# Patient Record
Sex: FEMALE | Race: WHITE | NOT HISPANIC OR LATINO
[De-identification: names, ages, dates, MRNs, and addresses within clinical notes are randomized per-mention and may not be internally consistent; named-entity substitution may affect disease eponyms.]

---

## 2017-08-15 ENCOUNTER — APPOINTMENT (OUTPATIENT)
Dept: OPHTHALMOLOGY | Facility: CLINIC | Age: 82
End: 2017-08-15
Payer: MEDICARE

## 2017-08-15 PROCEDURE — 92134 CPTRZ OPH DX IMG PST SGM RTA: CPT

## 2017-08-15 PROCEDURE — 92014 COMPRE OPH EXAM EST PT 1/>: CPT

## 2017-08-17 ENCOUNTER — APPOINTMENT (OUTPATIENT)
Dept: OPHTHALMOLOGY | Facility: CLINIC | Age: 82
End: 2017-08-17
Payer: MEDICARE

## 2017-08-17 DIAGNOSIS — H35.09 OTHER INTRARETINAL MICROVASCULAR ABNORMALITIES: ICD-10-CM

## 2017-08-17 PROCEDURE — 92014 COMPRE OPH EXAM EST PT 1/>: CPT

## 2017-08-17 PROCEDURE — 92083 EXTENDED VISUAL FIELD XM: CPT

## 2017-08-17 PROCEDURE — 92250 FUNDUS PHOTOGRAPHY W/I&R: CPT

## 2018-06-15 ENCOUNTER — INPATIENT (INPATIENT)
Facility: HOSPITAL | Age: 83
LOS: 1 days | Discharge: HOME | DRG: 395 | End: 2018-06-17
Attending: INTERNAL MEDICINE | Admitting: INTERNAL MEDICINE
Payer: COMMERCIAL

## 2018-06-15 VITALS
WEIGHT: 123.02 LBS | HEART RATE: 96 BPM | DIASTOLIC BLOOD PRESSURE: 82 MMHG | OXYGEN SATURATION: 97 % | SYSTOLIC BLOOD PRESSURE: 150 MMHG

## 2018-06-15 DIAGNOSIS — I10 ESSENTIAL (PRIMARY) HYPERTENSION: ICD-10-CM

## 2018-06-15 DIAGNOSIS — K59.00 CONSTIPATION, UNSPECIFIED: ICD-10-CM

## 2018-06-15 DIAGNOSIS — I63.9 CEREBRAL INFARCTION, UNSPECIFIED: ICD-10-CM

## 2018-06-15 DIAGNOSIS — M48.00 SPINAL STENOSIS, SITE UNSPECIFIED: ICD-10-CM

## 2018-06-15 DIAGNOSIS — E03.9 HYPOTHYROIDISM, UNSPECIFIED: ICD-10-CM

## 2018-06-15 DIAGNOSIS — K92.2 GASTROINTESTINAL HEMORRHAGE, UNSPECIFIED: ICD-10-CM

## 2018-06-15 DIAGNOSIS — F32.9 MAJOR DEPRESSIVE DISORDER, SINGLE EPISODE, UNSPECIFIED: ICD-10-CM

## 2018-06-15 DIAGNOSIS — Z29.9 ENCOUNTER FOR PROPHYLACTIC MEASURES, UNSPECIFIED: ICD-10-CM

## 2018-06-15 DIAGNOSIS — M79.7 FIBROMYALGIA: ICD-10-CM

## 2018-06-15 LAB
ALBUMIN SERPL ELPH-MCNC: 4.7 G/DL — SIGNIFICANT CHANGE UP (ref 3.3–5)
ALP SERPL-CCNC: 86 U/L — SIGNIFICANT CHANGE UP (ref 40–120)
ALT FLD-CCNC: 12 U/L — SIGNIFICANT CHANGE UP (ref 10–45)
ANION GAP SERPL CALC-SCNC: 14 MMOL/L — SIGNIFICANT CHANGE UP (ref 5–17)
APTT BLD: 33.1 SEC — SIGNIFICANT CHANGE UP (ref 27.5–37.4)
AST SERPL-CCNC: 18 U/L — SIGNIFICANT CHANGE UP (ref 10–40)
BASE EXCESS BLDV CALC-SCNC: 1.9 MMOL/L — SIGNIFICANT CHANGE UP (ref -2–2)
BASOPHILS # BLD AUTO: 0 K/UL — SIGNIFICANT CHANGE UP (ref 0–0.2)
BASOPHILS NFR BLD AUTO: 0.3 % — SIGNIFICANT CHANGE UP (ref 0–2)
BILIRUB SERPL-MCNC: 1.1 MG/DL — SIGNIFICANT CHANGE UP (ref 0.2–1.2)
BLD GP AB SCN SERPL QL: NEGATIVE — SIGNIFICANT CHANGE UP
BUN SERPL-MCNC: 14 MG/DL — SIGNIFICANT CHANGE UP (ref 7–23)
CA-I SERPL-SCNC: 1.22 MMOL/L — SIGNIFICANT CHANGE UP (ref 1.12–1.3)
CALCIUM SERPL-MCNC: 9.5 MG/DL — SIGNIFICANT CHANGE UP (ref 8.4–10.5)
CHLORIDE BLDV-SCNC: 101 MMOL/L — SIGNIFICANT CHANGE UP (ref 96–108)
CHLORIDE SERPL-SCNC: 97 MMOL/L — SIGNIFICANT CHANGE UP (ref 96–108)
CO2 BLDV-SCNC: 28 MMOL/L — SIGNIFICANT CHANGE UP (ref 22–30)
CO2 SERPL-SCNC: 25 MMOL/L — SIGNIFICANT CHANGE UP (ref 22–31)
CREAT SERPL-MCNC: 0.77 MG/DL — SIGNIFICANT CHANGE UP (ref 0.5–1.3)
EOSINOPHIL # BLD AUTO: 0.1 K/UL — SIGNIFICANT CHANGE UP (ref 0–0.5)
EOSINOPHIL NFR BLD AUTO: 0.8 % — SIGNIFICANT CHANGE UP (ref 0–6)
GAS PNL BLDV: 136 MMOL/L — SIGNIFICANT CHANGE UP (ref 136–145)
GAS PNL BLDV: SIGNIFICANT CHANGE UP
GLUCOSE BLDV-MCNC: 116 MG/DL — HIGH (ref 70–99)
GLUCOSE SERPL-MCNC: 118 MG/DL — HIGH (ref 70–99)
HCO3 BLDV-SCNC: 27 MMOL/L — SIGNIFICANT CHANGE UP (ref 21–29)
HCT VFR BLD CALC: 43.7 % — SIGNIFICANT CHANGE UP (ref 34.5–45)
HCT VFR BLD CALC: 45.7 % — HIGH (ref 34.5–45)
HCT VFR BLDA CALC: 47 % — SIGNIFICANT CHANGE UP (ref 39–50)
HGB BLD CALC-MCNC: 15.4 G/DL — SIGNIFICANT CHANGE UP (ref 11.5–15.5)
HGB BLD-MCNC: 15.1 G/DL — SIGNIFICANT CHANGE UP (ref 11.5–15.5)
HGB BLD-MCNC: 15.8 G/DL — HIGH (ref 11.5–15.5)
INR BLD: 1.02 RATIO — SIGNIFICANT CHANGE UP (ref 0.88–1.16)
LACTATE BLDV-MCNC: 1.8 MMOL/L — SIGNIFICANT CHANGE UP (ref 0.7–2)
LYMPHOCYTES # BLD AUTO: 1.1 K/UL — SIGNIFICANT CHANGE UP (ref 1–3.3)
LYMPHOCYTES # BLD AUTO: 12.5 % — LOW (ref 13–44)
MCHC RBC-ENTMCNC: 31.6 PG — SIGNIFICANT CHANGE UP (ref 27–34)
MCHC RBC-ENTMCNC: 31.8 PG — SIGNIFICANT CHANGE UP (ref 27–34)
MCHC RBC-ENTMCNC: 34.6 GM/DL — SIGNIFICANT CHANGE UP (ref 32–36)
MCHC RBC-ENTMCNC: 34.6 GM/DL — SIGNIFICANT CHANGE UP (ref 32–36)
MCV RBC AUTO: 91.4 FL — SIGNIFICANT CHANGE UP (ref 80–100)
MCV RBC AUTO: 91.8 FL — SIGNIFICANT CHANGE UP (ref 80–100)
MONOCYTES # BLD AUTO: 0.6 K/UL — SIGNIFICANT CHANGE UP (ref 0–0.9)
MONOCYTES NFR BLD AUTO: 6.9 % — SIGNIFICANT CHANGE UP (ref 2–14)
NEUTROPHILS # BLD AUTO: 7.1 K/UL — SIGNIFICANT CHANGE UP (ref 1.8–7.4)
NEUTROPHILS NFR BLD AUTO: 79.5 % — HIGH (ref 43–77)
PCO2 BLDV: 46 MMHG — SIGNIFICANT CHANGE UP (ref 35–50)
PH BLDV: 7.39 — SIGNIFICANT CHANGE UP (ref 7.35–7.45)
PLATELET # BLD AUTO: 249 K/UL — SIGNIFICANT CHANGE UP (ref 150–400)
PLATELET # BLD AUTO: 257 K/UL — SIGNIFICANT CHANGE UP (ref 150–400)
PO2 BLDV: 21 MMHG — LOW (ref 25–45)
POTASSIUM BLDV-SCNC: 3.8 MMOL/L — SIGNIFICANT CHANGE UP (ref 3.5–5.3)
POTASSIUM SERPL-MCNC: 4.1 MMOL/L — SIGNIFICANT CHANGE UP (ref 3.5–5.3)
POTASSIUM SERPL-SCNC: 4.1 MMOL/L — SIGNIFICANT CHANGE UP (ref 3.5–5.3)
PROT SERPL-MCNC: 7.8 G/DL — SIGNIFICANT CHANGE UP (ref 6–8.3)
PROTHROM AB SERPL-ACNC: 11 SEC — SIGNIFICANT CHANGE UP (ref 9.8–12.7)
RBC # BLD: 4.78 M/UL — SIGNIFICANT CHANGE UP (ref 3.8–5.2)
RBC # BLD: 4.98 M/UL — SIGNIFICANT CHANGE UP (ref 3.8–5.2)
RBC # FLD: 11.3 % — SIGNIFICANT CHANGE UP (ref 10.3–14.5)
RBC # FLD: 11.5 % — SIGNIFICANT CHANGE UP (ref 10.3–14.5)
RH IG SCN BLD-IMP: POSITIVE — SIGNIFICANT CHANGE UP
SAO2 % BLDV: 31 % — LOW (ref 67–88)
SODIUM SERPL-SCNC: 136 MMOL/L — SIGNIFICANT CHANGE UP (ref 135–145)
WBC # BLD: 8.9 K/UL — SIGNIFICANT CHANGE UP (ref 3.8–10.5)
WBC # BLD: 9.6 K/UL — SIGNIFICANT CHANGE UP (ref 3.8–10.5)
WBC # FLD AUTO: 8.9 K/UL — SIGNIFICANT CHANGE UP (ref 3.8–10.5)
WBC # FLD AUTO: 9.6 K/UL — SIGNIFICANT CHANGE UP (ref 3.8–10.5)

## 2018-06-15 PROCEDURE — 99285 EMERGENCY DEPT VISIT HI MDM: CPT | Mod: 25

## 2018-06-15 PROCEDURE — 99223 1ST HOSP IP/OBS HIGH 75: CPT

## 2018-06-15 PROCEDURE — 93010 ELECTROCARDIOGRAM REPORT: CPT

## 2018-06-15 RX ORDER — OXYCODONE HYDROCHLORIDE 5 MG/1
5 TABLET ORAL ONCE
Qty: 0 | Refills: 0 | Status: DISCONTINUED | OUTPATIENT
Start: 2018-06-15 | End: 2018-06-15

## 2018-06-15 RX ORDER — LEVOTHYROXINE SODIUM 125 MCG
112 TABLET ORAL DAILY
Qty: 0 | Refills: 0 | Status: DISCONTINUED | OUTPATIENT
Start: 2018-06-15 | End: 2018-06-17

## 2018-06-15 RX ORDER — POLYETHYLENE GLYCOL 3350 17 G/17G
17 POWDER, FOR SOLUTION ORAL
Qty: 0 | Refills: 0 | Status: DISCONTINUED | OUTPATIENT
Start: 2018-06-15 | End: 2018-06-17

## 2018-06-15 RX ORDER — DIAZEPAM 5 MG
5 TABLET ORAL
Qty: 0 | Refills: 0 | Status: DISCONTINUED | OUTPATIENT
Start: 2018-06-15 | End: 2018-06-17

## 2018-06-15 RX ORDER — DULOXETINE HYDROCHLORIDE 30 MG/1
60 CAPSULE, DELAYED RELEASE ORAL DAILY
Qty: 0 | Refills: 0 | Status: DISCONTINUED | OUTPATIENT
Start: 2018-06-15 | End: 2018-06-17

## 2018-06-15 RX ORDER — OXYCODONE HYDROCHLORIDE 5 MG/1
10 TABLET ORAL EVERY 4 HOURS
Qty: 0 | Refills: 0 | Status: DISCONTINUED | OUTPATIENT
Start: 2018-06-15 | End: 2018-06-17

## 2018-06-15 RX ORDER — ACETAMINOPHEN 500 MG
325 TABLET ORAL ONCE
Qty: 0 | Refills: 0 | Status: COMPLETED | OUTPATIENT
Start: 2018-06-15 | End: 2018-06-15

## 2018-06-15 RX ORDER — SENNA PLUS 8.6 MG/1
2 TABLET ORAL AT BEDTIME
Qty: 0 | Refills: 0 | Status: DISCONTINUED | OUTPATIENT
Start: 2018-06-15 | End: 2018-06-17

## 2018-06-15 RX ORDER — OXYCODONE HYDROCHLORIDE 5 MG/1
10 TABLET ORAL ONCE
Qty: 0 | Refills: 0 | Status: DISCONTINUED | OUTPATIENT
Start: 2018-06-15 | End: 2018-06-15

## 2018-06-15 RX ORDER — ACETAMINOPHEN 500 MG
975 TABLET ORAL ONCE
Qty: 0 | Refills: 0 | Status: DISCONTINUED | OUTPATIENT
Start: 2018-06-15 | End: 2018-06-15

## 2018-06-15 RX ORDER — DOCUSATE SODIUM 100 MG
100 CAPSULE ORAL THREE TIMES A DAY
Qty: 0 | Refills: 0 | Status: DISCONTINUED | OUTPATIENT
Start: 2018-06-15 | End: 2018-06-17

## 2018-06-15 RX ORDER — SODIUM CHLORIDE 9 MG/ML
500 INJECTION INTRAMUSCULAR; INTRAVENOUS; SUBCUTANEOUS ONCE
Qty: 0 | Refills: 0 | Status: DISCONTINUED | OUTPATIENT
Start: 2018-06-15 | End: 2018-06-17

## 2018-06-15 RX ADMIN — POLYETHYLENE GLYCOL 3350 17 GRAM(S): 17 POWDER, FOR SOLUTION ORAL at 18:38

## 2018-06-15 RX ADMIN — OXYCODONE HYDROCHLORIDE 10 MILLIGRAM(S): 5 TABLET ORAL at 15:27

## 2018-06-15 RX ADMIN — OXYCODONE HYDROCHLORIDE 10 MILLIGRAM(S): 5 TABLET ORAL at 20:09

## 2018-06-15 RX ADMIN — Medication 325 MILLIGRAM(S): at 15:27

## 2018-06-15 RX ADMIN — Medication 5 MILLIGRAM(S): at 21:26

## 2018-06-15 RX ADMIN — Medication 100 MILLIGRAM(S): at 21:26

## 2018-06-15 RX ADMIN — Medication 325 MILLIGRAM(S): at 16:18

## 2018-06-15 RX ADMIN — DULOXETINE HYDROCHLORIDE 60 MILLIGRAM(S): 30 CAPSULE, DELAYED RELEASE ORAL at 21:26

## 2018-06-15 RX ADMIN — SENNA PLUS 2 TABLET(S): 8.6 TABLET ORAL at 21:26

## 2018-06-15 RX ADMIN — OXYCODONE HYDROCHLORIDE 10 MILLIGRAM(S): 5 TABLET ORAL at 16:18

## 2018-06-15 RX ADMIN — OXYCODONE HYDROCHLORIDE 10 MILLIGRAM(S): 5 TABLET ORAL at 21:14

## 2018-06-15 NOTE — ED PROVIDER NOTE - OBJECTIVE STATEMENT
Attn - pt seen in [facility] - painless rectal bleeding after passed hard stool last night and pt attempted disimpaction with finger.  Pt had chronic intermittent abdo cramping and none different than usual.  Prior abdo surgery for hysterectomy, oophorectomy. and spinal fusion.  hx of hemorrhoids.  Pt takes Plavix and ASA CAD and CVA.  NO: other bleeding sites, lightheaded, chest pain, palp,

## 2018-06-15 NOTE — H&P ADULT - NSHPREVIEWOFSYSTEMS_GEN_ALL_CORE
CONSTITUTIONAL: No weakness, fevers or chills  EYES/ENT: No visual changes;  No vertigo or throat pain   NECK: No pain or stiffness  RESPIRATORY: No cough, wheezing, hemoptysis; No shortness of breath  CARDIOVASCULAR: No chest pain or palpitations  GASTROINTESTINAL: No abdominal or epigastric pain. No nausea, vomiting, or hematemesis; + constipation. + hematochezia.  GENITOURINARY: No dysuria, frequency or hematuria  NEUROLOGICAL: No numbness or weakness  SKIN: No itching, burning, rashes, or lesions   All other review of systems is negative unless indicated above.

## 2018-06-15 NOTE — ED PROVIDER NOTE - MEDICAL DECISION MAKING DETAILS
Attn - pt with painless rectal bleeding on ASA and Plavix - internal hemorrhoid vs diverticulosis.  Labs, monitor.  hemodynamically stable.  admit.

## 2018-06-15 NOTE — H&P ADULT - PMH
Benign Essential Hypertension    CVA (cerebral vascular accident)    Depression    Essential hypertension    Fibromyalgia    Hypothyroidism    Spinal stenosis

## 2018-06-15 NOTE — ED PROVIDER NOTE - NS ED ROS FT
ROS: no CP/SOB. no cough. no fever. no n/v/d/c. +abd pain. no rash. +bleeding. no urinary complaints. +weakness. no vision changes. no HA. no neck/back pain. no extremity swelling/deformity. No change in mental status.

## 2018-06-15 NOTE — H&P ADULT - PROBLEM SELECTOR PLAN 4
h/o chronic constipation.   - Start bowel regimen with senna, colace and miralax.   - To check AXR to assess abdomen

## 2018-06-15 NOTE — ED PROVIDER NOTE - PHYSICAL EXAMINATION
Gen: NAD, AOx3  Head: NCAT  HEENT: PERRL, oral mucosa moist, normal conjunctiva, neck supple  Lung: CTAB, no respiratory distress  CV: rrr, no murmur, Normal perfusion  Abd: soft, NTND  Rectal: external hemorrhoids not bleeding, no stool in vault, +BRB on rectal exam  MSK: No edema, no visible deformities  Neuro: No focal neurologic deficits  Skin: No rash   Psych: normal affect

## 2018-06-15 NOTE — H&P ADULT - NSHPPHYSICALEXAM_GEN_ALL_CORE
.  VITAL SIGNS:  T(C): 37.2 (06-15-18 @ 14:30), Max: 37.2 (06-15-18 @ 14:30)  T(F): 98.9 (06-15-18 @ 14:30), Max: 98.9 (06-15-18 @ 14:30)  HR: 95 (06-15-18 @ 14:30) (95 - 96)  BP: 189/92 (06-15-18 @ 14:30) (150/82 - 189/92)  BP(mean): --  RR: 18 (06-15-18 @ 14:30) (18 - 18)  SpO2: 99% (06-15-18 @ 14:30) (97% - 99%)  Wt(kg): --    PHYSICAL EXAM:    Constitutional: WDWN resting comfortably in bed; NAD  Head: NC/AT  Eyes: PERRL, EOMI, anicteric sclera  ENT: no nasal discharge; uvula midline, no oropharyngeal erythema or exudates; MMM  Neck: supple; no JVD   Respiratory: CTA B/L; no W/R/R, no retractions  Cardiac: +S1/S2; RRR; no M/R/G  Gastrointestinal: soft, NT/ND; no rebound or guarding; +BSx4  Back: spine midline, no bony tenderness or step-offs  Extremities: WWP, no clubbing or cyanosis  Musculoskeletal: NROM x4; no joint swelling, tenderness or erythema  Vascular: 2+ radial, femoral, DP/PT pulses B/L  Neurologic: AAOx3; CNII-XII grossly intact; no focal deficits

## 2018-06-15 NOTE — ED ADULT NURSE NOTE - CHPI ED SYMPTOMS NEG
no vomiting/no dysuria/no burning urination/no diarrhea/no chills/no fever/no hematuria/no nausea/no abdominal distension

## 2018-06-15 NOTE — ED ADULT NURSE NOTE - CHIEF COMPLAINT QUOTE
rectal bleed x yesterday was seen at [facility], [place] was advised to come get seen

## 2018-06-15 NOTE — H&P ADULT - PROBLEM SELECTOR PLAN 1
Presenting with BRBPR for one day. Hemodynamically stable, on aspirin (taking 325mg when should be taking 81mg) and plavix. Possibly 2/2 diverticular bleed vs internal hemorrhoid vs tear given manual manipulation and history. Doubt upper GI bleed. Has never had c-scope as per pt 2/2 difficult anatomy. Last EGD 3 years prior  - Monitor CBC Q6  - Check orthostatics  - Will given jamila fluid hydration  - GI consult  - WIll hold aspirin and plavix  - SCD's  - Clear Liquid diet for now pending GI recommendations

## 2018-06-15 NOTE — H&P ADULT - HISTORY OF PRESENT ILLNESS
This is a 89 y/o female with h/o HTN, fibromyalgia, spinal stenosis on chronic pain medications, CVA who presented with BRBPR since last evening. Pt states that she was in her usual state of health, when last night since she had not had a BM in the past two days decided that she needed to use the restroom prior to bed. Chronic constipation is an issue with her since she takes opioids for pain. Pt inserted two suppositories and when nothing was happening began to strain more than she usually does and when she looked into the bowel noticed blood in the toliet. Blood was bright red. No clots. Pt then went to manually disimpact herself as she occasionally does and produced minimal bowel movement. Pt decided to go to bed hoping bleeding would stop, and when she woke up noticed that bleeding continued. She then went to see her PMD who referred her to ED for evaluation. Denied ever having c-scope. No bleeding in the past. Denies abdominal pain. No fever/chills. No dark stools

## 2018-06-15 NOTE — ED ADULT NURSE NOTE - OBJECTIVE STATEMENT
89 year old female patient on ASA and Plavix, presents to ED for rectal bleeding since last night. Patient states she felt constipated so she used a suppository and attempted to manually disimpact herself without success and then saw bright red blood in the toilet bowel. Patient states she continued to bleed through today, and noticed she was passing a few clots earlier today. Patient denies CP, SOB, abd pain, n/v/d, fever, chills, weakness. Patient able to speak in full sentences without SOB. Patient reporting chronic back pain, and states she has not been able to take any of her medication. Patient aware of plan of care, resting comfortably in bed.

## 2018-06-15 NOTE — H&P ADULT - ASSESSMENT
89 y/o female with h/o HTN, fibromyalgia, spinal stenosis on chronic pain medications, CVA who presented with BRBPR likely 2/2 LGIB

## 2018-06-15 NOTE — ED CLERICAL - NS ED CLERK NOTE PRE-ARRIVAL INFORMATION; ADDITIONAL PRE-ARRIVAL INFORMATION
28, rectal bleeding fresh since last night, on aspirin & plavix, dr. scott needs to see pt([phone])

## 2018-06-15 NOTE — ED PROVIDER NOTE - ATTENDING CONTRIBUTION TO CARE
I performed a history and physical exam of the patient and discussed their management with the resident/ACP. I reviewed the resident/ACP's note and agree with the documented findings and plan of care.  attn -  see MDM

## 2018-06-16 ENCOUNTER — TRANSCRIPTION ENCOUNTER (OUTPATIENT)
Age: 83
End: 2018-06-16

## 2018-06-16 LAB
ANION GAP SERPL CALC-SCNC: 16 MMOL/L — SIGNIFICANT CHANGE UP (ref 5–17)
BUN SERPL-MCNC: 13 MG/DL — SIGNIFICANT CHANGE UP (ref 7–23)
CALCIUM SERPL-MCNC: 9.1 MG/DL — SIGNIFICANT CHANGE UP (ref 8.4–10.5)
CHLORIDE SERPL-SCNC: 99 MMOL/L — SIGNIFICANT CHANGE UP (ref 96–108)
CO2 SERPL-SCNC: 23 MMOL/L — SIGNIFICANT CHANGE UP (ref 22–31)
CREAT SERPL-MCNC: 0.75 MG/DL — SIGNIFICANT CHANGE UP (ref 0.5–1.3)
GLUCOSE SERPL-MCNC: 93 MG/DL — SIGNIFICANT CHANGE UP (ref 70–99)
HCT VFR BLD CALC: 40.9 % — SIGNIFICANT CHANGE UP (ref 34.5–45)
HGB BLD-MCNC: 13.9 G/DL — SIGNIFICANT CHANGE UP (ref 11.5–15.5)
MAGNESIUM SERPL-MCNC: 2 MG/DL — SIGNIFICANT CHANGE UP (ref 1.6–2.6)
MCHC RBC-ENTMCNC: 30.7 PG — SIGNIFICANT CHANGE UP (ref 27–34)
MCHC RBC-ENTMCNC: 34 GM/DL — SIGNIFICANT CHANGE UP (ref 32–36)
MCV RBC AUTO: 90.3 FL — SIGNIFICANT CHANGE UP (ref 80–100)
PLATELET # BLD AUTO: 194 K/UL — SIGNIFICANT CHANGE UP (ref 150–400)
POTASSIUM SERPL-MCNC: 3.7 MMOL/L — SIGNIFICANT CHANGE UP (ref 3.5–5.3)
POTASSIUM SERPL-SCNC: 3.7 MMOL/L — SIGNIFICANT CHANGE UP (ref 3.5–5.3)
RBC # BLD: 4.53 M/UL — SIGNIFICANT CHANGE UP (ref 3.8–5.2)
RBC # FLD: 12.7 % — SIGNIFICANT CHANGE UP (ref 10.3–14.5)
SODIUM SERPL-SCNC: 138 MMOL/L — SIGNIFICANT CHANGE UP (ref 135–145)
TSH SERPL-MCNC: 1.53 UIU/ML — SIGNIFICANT CHANGE UP (ref 0.27–4.2)
WBC # BLD: 5.49 K/UL — SIGNIFICANT CHANGE UP (ref 3.8–10.5)
WBC # FLD AUTO: 5.49 K/UL — SIGNIFICANT CHANGE UP (ref 3.8–10.5)

## 2018-06-16 RX ADMIN — Medication 100 MILLIGRAM(S): at 11:27

## 2018-06-16 RX ADMIN — OXYCODONE HYDROCHLORIDE 10 MILLIGRAM(S): 5 TABLET ORAL at 15:47

## 2018-06-16 RX ADMIN — Medication 100 MILLIGRAM(S): at 06:12

## 2018-06-16 RX ADMIN — OXYCODONE HYDROCHLORIDE 10 MILLIGRAM(S): 5 TABLET ORAL at 11:26

## 2018-06-16 RX ADMIN — OXYCODONE HYDROCHLORIDE 10 MILLIGRAM(S): 5 TABLET ORAL at 20:28

## 2018-06-16 RX ADMIN — POLYETHYLENE GLYCOL 3350 17 GRAM(S): 17 POWDER, FOR SOLUTION ORAL at 17:08

## 2018-06-16 RX ADMIN — OXYCODONE HYDROCHLORIDE 10 MILLIGRAM(S): 5 TABLET ORAL at 11:49

## 2018-06-16 RX ADMIN — Medication 1 TABLET(S): at 11:27

## 2018-06-16 RX ADMIN — Medication 112 MICROGRAM(S): at 06:13

## 2018-06-16 RX ADMIN — SENNA PLUS 2 TABLET(S): 8.6 TABLET ORAL at 21:04

## 2018-06-16 RX ADMIN — DULOXETINE HYDROCHLORIDE 60 MILLIGRAM(S): 30 CAPSULE, DELAYED RELEASE ORAL at 11:27

## 2018-06-16 RX ADMIN — OXYCODONE HYDROCHLORIDE 10 MILLIGRAM(S): 5 TABLET ORAL at 15:45

## 2018-06-16 RX ADMIN — Medication 100 MILLIGRAM(S): at 21:04

## 2018-06-16 RX ADMIN — OXYCODONE HYDROCHLORIDE 10 MILLIGRAM(S): 5 TABLET ORAL at 20:50

## 2018-06-16 RX ADMIN — OXYCODONE HYDROCHLORIDE 10 MILLIGRAM(S): 5 TABLET ORAL at 23:50

## 2018-06-16 NOTE — DISCHARGE NOTE ADULT - CARE PROVIDER_API CALL
Primary Care Physician,   Phone: (   )    -  Fax: (   )    -    Dr. Aguirre (GI),   Phone: (   )    -  Fax: (   )    -

## 2018-06-16 NOTE — CONSULT NOTE ADULT - SUBJECTIVE AND OBJECTIVE BOX
Patient is a 89y old  Female who presents with a chief complaint of rectal bleeding(16 Jun 2018 11:40)      HPI:  This is a 89 y/o female with h/o HTN, fibromyalgia, spinal stenosis on chronic pain medications, CVA who presented with BRBPR since last evening. Pt states that she was in her usual state of health, when last night since she had not had a BM in the past two days decided that she needed to use the restroom prior to bed. Chronic constipation is an issue with her since she takes opioids for pain. Pt inserted two suppositories and when nothing was happening began to strain more than she usually does and when she looked into the bowel noticed blood in the toliet. Blood was bright red. No clots. Pt then went to manually disimpact herself as she occasionally does and produced minimal bowel movement. Pt decided to go to bed hoping bleeding would stop, and when she woke up noticed that bleeding continued. She then went to see her PMD who referred her to ED for evaluation. Denied ever having c-scope. No bleeding in the past. Denies abdominal pain. No fever/chills. No dark stools (15 Vishal 2018 16:38) The bleeding persisted overnight but her most recent bowel movement was normal  she denies abdominal pain  she denies nausea or vomiting      PAST MEDICAL & SURGICAL HISTORY:  Essential hypertension  Spinal stenosis  CVA (cerebral vascular accident)  Fibromyalgia  Depression  Hypothyroidism  Benign Essential Hypertension  Cataract: 2008, 2009  S/P Laminectomy: Lumbar Laminectomy 2001  History of Tonsillectomy: childhood  H/O: Hysterectomy: 40 years ago      MEDICATIONS  (STANDING):  docusate sodium 100 milliGRAM(s) Oral three times a day  DULoxetine 60 milliGRAM(s) Oral daily  levothyroxine 112 MICROGram(s) Oral daily  multivitamin 1 Tablet(s) Oral daily  polyethylene glycol 3350 17 Gram(s) Oral two times a day  senna 2 Tablet(s) Oral at bedtime  sodium chloride 0.9% Bolus 500 milliLiter(s) IV Bolus once    MEDICATIONS  (PRN):  diazepam    Tablet 5 milliGRAM(s) Oral two times a day PRN Anxiety  oxyCODONE    IR 10 milliGRAM(s) Oral every 4 hours PRN Severe Pain (7 - 10)      Allergies    dust (Sneezing)  lactose (Unknown)  nonsteroidal anti-inflammatory agents (Unknown)  Originally Entered as [Unknown] reaction to [SULFITE] (Unknown)  penicillin (Rash)    Intolerances        Review of Systems:    General:  No wt loss, fevers, chills, night sweats,fatigue,   CV:  No pain, palpitatioins, hypo/hypertension  Resp:  No dyspnea, cough, tachypnea, wheezing  GI:  No pain, No nausea, No vomiting, No diarrhea, No constipatiion, No weight loss, No fever, No pruritis, +rectal bleeding, No tarry stools, No dysphagia,  :  No pain, bleeding, incontinence, nocturia  Muscle:  No pain, weakness  Neuro:  No weakness, tingling, memory problems  Psych:  No fatigue, insomnia, mood problems, depression  Endocrine:  No polyuria, polydypsia, cold/heat intolerance  Heme:  No petechiae, ecchymosis, easy bruisability  Skin:  No rash, tattoos, scars, edema      Vital Signs Last 24 Hrs  T(C): 36.7 (16 Jun 2018 11:07), Max: 37.2 (15 Vishal 2018 14:30)  T(F): 98 (16 Jun 2018 11:07), Max: 98.9 (15 Vishal 2018 14:30)  HR: 89 (16 Jun 2018 11:07) (67 - 96)  BP: 157/63 (16 Jun 2018 11:07) (132/75 - 189/92)  BP(mean): --  RR: 18 (16 Jun 2018 11:07) (16 - 20)  SpO2: 98% (16 Jun 2018 11:07) (96% - 100%)    PHYSICAL EXAM:    Constitutional: NAD, well-developed  Neck: No LAD, supple  Respiratory: CTA and P  Cardiovascular: S1 and S2, RRR, no M  Gastrointestinal: BS+, soft, NT/ND, neg HSM,  Extremities: No peripheral edema, neg clubing, cyanosis  Vascular: 2+ peripheral pulses  Neurological: A/O x 3, no focal deficits  Psychiatric: Normal mood, normal affect  Skin: No rashes        LABS:                        13.9   5.49  )-----------( 194      ( 16 Jun 2018 09:15 )             40.9     06-16    138  |  99  |  13  ----------------------------<  93  3.7   |  23  |  0.75    Ca    9.1      16 Jun 2018 06:55  Mg     2.0     06-16    TPro  7.8  /  Alb  4.7  /  TBili  1.1  /  DBili  x   /  AST  18  /  ALT  12  /  AlkPhos  86  06-15    PT/INR - ( 15 Vishal 2018 14:52 )   PT: 11.0 sec;   INR: 1.02 ratio         PTT - ( 15 Vishal 2018 14:52 )  PTT:33.1 sec    LIVER FUNCTIONS - ( 15 Vishal 2018 14:52 )  Alb: 4.7 g/dL / Pro: 7.8 g/dL / ALK PHOS: 86 U/L / ALT: 12 U/L / AST: 18 U/L / GGT: x             RADIOLOGY & ADDITIONAL TESTS:

## 2018-06-16 NOTE — DISCHARGE NOTE ADULT - PROVIDER TOKENS
FREE:[LAST:[Primary Care Physician],PHONE:[(   )    -],FAX:[(   )    -]],FREE:[LAST:[Dr. Aguirre ()],PHONE:[(   )    -],FAX:[(   )    -]]

## 2018-06-16 NOTE — DISCHARGE NOTE ADULT - HOSPITAL COURSE
Pt admitted to [facility] because of BRBPR, likely 2' trauma from fecal impaction to rectosigmoid mucosa.  Pt hgb was stable from 12.6-13.9.  [facility] was consulted.  No indication for any endoscopic at this juncture.  We recommended adding senna to pt's stool softener regimen.  Also recommended using lanolin or glycerin/witch hazel based creams to help protect the inside of the rectum from further trauma/bleeding.  Pt advised to make f/u appt with her outside GI (Dr. Aguirre) in 1-2 weeks.

## 2018-06-16 NOTE — DISCHARGE NOTE ADULT - PATIENT PORTAL LINK FT
You can access the [facility] Patient Portal, offered by [facility], by registering with the following website: [email or web address]

## 2018-06-16 NOTE — DISCHARGE NOTE ADULT - PLAN OF CARE
resolved There are 2 common types of GI Bleed, Upper GI Bleed and Lower GI Bleed.  Upper GI Bleed affects the esophagus, stomach, and first part of the small intestine. Lower GI Bleed affects the colon and rectum.  Upper GI Bleed signs and symptoms to notify your Health Care Provider are vomiting blood, or coffee ground vomitus, and bowel movements that look like black tar.  Lower GI Bleed signs and symptoms to notify your health care provider are bright red bloody bowel movements.   Take your medications as prescribed by your Gastroenterologist.  If you have had an Endoscopy or Colonoscopy, follow up with your Gastroenterologist for Pathology results.  Avoid NSAIDs unless your Health Care Provider tells you that it is ok (Aspirin, Ibuprofen, Advil, Motrin, Aleve).  Follow up with your Gastroenterologist within 1-2 weeks of discharge. Follow up with your medical doctor to establish long term blood pressure treatment goals. Low salt diet  Activity as tolerated.  Take all medication as prescribed.  Follow up with your medical doctor for routine blood pressure monitoring at your next visit.  Notify your doctor if you have any of the following symptoms:   Dizziness, Lightheadedness, Blurry vision, Headache, Chest pain, Shortness of breath symptom management drink plenty of fluids and keep active  Laxatives as prescribed

## 2018-06-16 NOTE — DISCHARGE NOTE ADULT - MEDICATION SUMMARY - MEDICATIONS TO CHANGE
I will SWITCH the dose or number of times a day I take the medications listed below when I get home from the hospital:    Plavix 75 mg oral tablet  -- 1 tab(s) by mouth once a day

## 2018-06-16 NOTE — DISCHARGE NOTE ADULT - ADDITIONAL INSTRUCTIONS
Make appointments to follow up with your physicians  Resume your Aspirin (BUT "baby aspirin" 81mg NOT 325mg). Do not start taking your Plavix until NEXT SUNDAY (6/24/18)  Bring all discharge paperwork including discharge medication list to follow up appointments

## 2018-06-16 NOTE — DISCHARGE NOTE ADULT - MEDICATION SUMMARY - MEDICATIONS TO TAKE
I will START or STAY ON the medications listed below when I get home from the hospital:    Tylenol with Codeine #4 oral tablet  -- 1 tab(s) by mouth every 6 hours, As Needed  -- Indication: For pain    Aspirin Enteric Coated 81 mg oral delayed release tablet  -- 1 tab(s) by mouth once a day   -- Swallow whole.  Do not crush.  Take with food or milk.    -- Indication: For Coronary prophylaxis    losartan 100 mg oral tablet  -- 1 tab(s) by mouth once a day  -- Indication: For Essential hypertension    Valium 5 mg oral tablet  -- 1 tab(s) by mouth 3 times a day, As Needed  -- Indication: For anxiety    Cymbalta 60 mg oral delayed release capsule  -- 1 cap(s) by mouth once a day  -- Indication: For Depression    Plavix 75 mg oral tablet  -- 1 tab(s) by mouth once a day  DO NOT RESUME UNTIL JUNE 24, 2018  -- Indication: For Coronary artery disease    senna oral tablet  -- 2 tab(s) by mouth once a day (at bedtime).    Hold for loose stools.   -- Indication: For Laxative    polyethylene glycol 3350 oral powder for reconstitution  -- 17 gram(s) by mouth 2 times a day as needed for constipation  -- Indication: For Laxative    docusate sodium 100 mg oral capsule  -- 1 cap(s) by mouth 2 times a day.    Hold for loose stools.   -- Indication: For Laxative    Synthroid 112 mcg (0.112 mg) oral tablet  -- 1 tab(s) by mouth once a day  -- Indication: For Hypothyroidism    Multiple Vitamins oral tablet  -- 1 tab(s) by mouth once a day  -- Indication: For Suppliment

## 2018-06-16 NOTE — DISCHARGE NOTE ADULT - CARE PLAN
Principal Discharge DX:	Lower GI bleed  Goal:	resolved  Assessment and plan of treatment:	There are 2 common types of GI Bleed, Upper GI Bleed and Lower GI Bleed.  Upper GI Bleed affects the esophagus, stomach, and first part of the small intestine. Lower GI Bleed affects the colon and rectum.  Upper GI Bleed signs and symptoms to notify your Health Care Provider are vomiting blood, or coffee ground vomitus, and bowel movements that look like black tar.  Lower GI Bleed signs and symptoms to notify your health care provider are bright red bloody bowel movements.   Take your medications as prescribed by your Gastroenterologist.  If you have had an Endoscopy or Colonoscopy, follow up with your Gastroenterologist for Pathology results.  Avoid NSAIDs unless your Health Care Provider tells you that it is ok (Aspirin, Ibuprofen, Advil, Motrin, Aleve).  Follow up with your Gastroenterologist within 1-2 weeks of discharge.  Secondary Diagnosis:	Essential hypertension  Goal:	Follow up with your medical doctor to establish long term blood pressure treatment goals.  Assessment and plan of treatment:	Low salt diet  Activity as tolerated.  Take all medication as prescribed.  Follow up with your medical doctor for routine blood pressure monitoring at your next visit.  Notify your doctor if you have any of the following symptoms:   Dizziness, Lightheadedness, Blurry vision, Headache, Chest pain, Shortness of breath  Secondary Diagnosis:	Constipation  Goal:	symptom management  Assessment and plan of treatment:	drink plenty of fluids and keep active  Laxatives as prescribed

## 2018-06-16 NOTE — DISCHARGE NOTE ADULT - INSTRUCTIONS
call for  follow up  appointment  with  primary care   md   and  GI  md  diet  meds activity  as per md  any severe pain nausea  vomiting fevers  bleeding  any  problems  call md  call 911 [facility]  EMERGENCY ROOM low salt, low cholesterol

## 2018-06-16 NOTE — CONSULT NOTE ADULT - ASSESSMENT
89 year old woman with chronic constipation and rectal bleeding on aspirin and plavix that has since resolved. liekly rectal ucler versus fissure form bowel mvoment or disempaction versus hemorrhoids  stable hgb  continue miralax, colace and agree with addition of senna at night  can f/u as opt with her regular GI physician or our office  resume aspirin tomorrow and plavix in 1 week.   call with questions  will sign off  no contraindication to discahrge if has no further bleeding today  [phone]

## 2018-06-17 VITALS
RESPIRATION RATE: 18 BRPM | TEMPERATURE: 98 F | DIASTOLIC BLOOD PRESSURE: 90 MMHG | OXYGEN SATURATION: 97 % | SYSTOLIC BLOOD PRESSURE: 130 MMHG | HEART RATE: 70 BPM

## 2018-06-17 LAB
HCT VFR BLD CALC: 36.1 % — SIGNIFICANT CHANGE UP (ref 34.5–45)
HGB BLD-MCNC: 12.6 G/DL — SIGNIFICANT CHANGE UP (ref 11.5–15.5)
MCHC RBC-ENTMCNC: 31.3 PG — SIGNIFICANT CHANGE UP (ref 27–34)
MCHC RBC-ENTMCNC: 34.9 GM/DL — SIGNIFICANT CHANGE UP (ref 32–36)
MCV RBC AUTO: 89.6 FL — SIGNIFICANT CHANGE UP (ref 80–100)
PLATELET # BLD AUTO: 210 K/UL — SIGNIFICANT CHANGE UP (ref 150–400)
RBC # BLD: 4.03 M/UL — SIGNIFICANT CHANGE UP (ref 3.8–5.2)
RBC # FLD: 12.9 % — SIGNIFICANT CHANGE UP (ref 10.3–14.5)
WBC # BLD: 5.96 K/UL — SIGNIFICANT CHANGE UP (ref 3.8–10.5)
WBC # FLD AUTO: 5.96 K/UL — SIGNIFICANT CHANGE UP (ref 3.8–10.5)

## 2018-06-17 PROCEDURE — 93005 ELECTROCARDIOGRAM TRACING: CPT

## 2018-06-17 PROCEDURE — 83605 ASSAY OF LACTIC ACID: CPT

## 2018-06-17 PROCEDURE — 86901 BLOOD TYPING SEROLOGIC RH(D): CPT

## 2018-06-17 PROCEDURE — 86900 BLOOD TYPING SEROLOGIC ABO: CPT

## 2018-06-17 PROCEDURE — 85014 HEMATOCRIT: CPT

## 2018-06-17 PROCEDURE — 85730 THROMBOPLASTIN TIME PARTIAL: CPT

## 2018-06-17 PROCEDURE — 86850 RBC ANTIBODY SCREEN: CPT

## 2018-06-17 PROCEDURE — 80053 COMPREHEN METABOLIC PANEL: CPT

## 2018-06-17 PROCEDURE — 85610 PROTHROMBIN TIME: CPT

## 2018-06-17 PROCEDURE — 83735 ASSAY OF MAGNESIUM: CPT

## 2018-06-17 PROCEDURE — 82330 ASSAY OF CALCIUM: CPT

## 2018-06-17 PROCEDURE — 99285 EMERGENCY DEPT VISIT HI MDM: CPT

## 2018-06-17 PROCEDURE — 82803 BLOOD GASES ANY COMBINATION: CPT

## 2018-06-17 PROCEDURE — 84132 ASSAY OF SERUM POTASSIUM: CPT

## 2018-06-17 PROCEDURE — 82947 ASSAY GLUCOSE BLOOD QUANT: CPT

## 2018-06-17 PROCEDURE — 82435 ASSAY OF BLOOD CHLORIDE: CPT

## 2018-06-17 PROCEDURE — 84295 ASSAY OF SERUM SODIUM: CPT

## 2018-06-17 PROCEDURE — 80048 BASIC METABOLIC PNL TOTAL CA: CPT

## 2018-06-17 PROCEDURE — 84443 ASSAY THYROID STIM HORMONE: CPT

## 2018-06-17 PROCEDURE — G0378: CPT

## 2018-06-17 PROCEDURE — 85027 COMPLETE CBC AUTOMATED: CPT

## 2018-06-17 RX ORDER — DOCUSATE SODIUM 100 MG
1 CAPSULE ORAL
Qty: 60 | Refills: 0 | OUTPATIENT
Start: 2018-06-17 | End: 2018-07-16

## 2018-06-17 RX ORDER — ASPIRIN/CALCIUM CARB/MAGNESIUM 324 MG
1 TABLET ORAL
Qty: 0 | Refills: 0 | COMMUNITY

## 2018-06-17 RX ORDER — SENNA PLUS 8.6 MG/1
2 TABLET ORAL
Qty: 60 | Refills: 0
Start: 2018-06-17 | End: 2018-07-16

## 2018-06-17 RX ORDER — ASPIRIN/CALCIUM CARB/MAGNESIUM 324 MG
1 TABLET ORAL
Qty: 30 | Refills: 0
Start: 2018-06-17 | End: 2018-07-16

## 2018-06-17 RX ORDER — CLOPIDOGREL BISULFATE 75 MG/1
1 TABLET, FILM COATED ORAL
Qty: 0 | Refills: 0 | COMMUNITY

## 2018-06-17 RX ORDER — POLYETHYLENE GLYCOL 3350 17 G/17G
17 POWDER, FOR SOLUTION ORAL
Qty: 250 | Refills: 0 | OUTPATIENT
Start: 2018-06-17 | End: 2018-06-29

## 2018-06-17 RX ORDER — DOCUSATE SODIUM 100 MG
1 CAPSULE ORAL
Qty: 60 | Refills: 0
Start: 2018-06-17 | End: 2018-07-16

## 2018-06-17 RX ORDER — POLYETHYLENE GLYCOL 3350 17 G/17G
17 POWDER, FOR SOLUTION ORAL
Qty: 250 | Refills: 0
Start: 2018-06-17 | End: 2018-06-29

## 2018-06-17 RX ORDER — SENNA PLUS 8.6 MG/1
2 TABLET ORAL
Qty: 60 | Refills: 0 | OUTPATIENT
Start: 2018-06-17 | End: 2018-07-16

## 2018-06-17 RX ADMIN — OXYCODONE HYDROCHLORIDE 10 MILLIGRAM(S): 5 TABLET ORAL at 12:32

## 2018-06-17 RX ADMIN — Medication 112 MICROGRAM(S): at 06:38

## 2018-06-17 RX ADMIN — Medication 5 MILLIGRAM(S): at 00:16

## 2018-06-17 RX ADMIN — Medication 100 MILLIGRAM(S): at 06:59

## 2018-06-17 RX ADMIN — Medication 100 MILLIGRAM(S): at 12:30

## 2018-06-17 RX ADMIN — POLYETHYLENE GLYCOL 3350 17 GRAM(S): 17 POWDER, FOR SOLUTION ORAL at 06:38

## 2018-06-17 RX ADMIN — Medication 1 TABLET(S): at 12:30

## 2018-06-17 RX ADMIN — OXYCODONE HYDROCHLORIDE 10 MILLIGRAM(S): 5 TABLET ORAL at 00:20

## 2018-06-17 RX ADMIN — OXYCODONE HYDROCHLORIDE 10 MILLIGRAM(S): 5 TABLET ORAL at 15:36

## 2018-06-17 RX ADMIN — DULOXETINE HYDROCHLORIDE 60 MILLIGRAM(S): 30 CAPSULE, DELAYED RELEASE ORAL at 12:30

## 2018-06-17 RX ADMIN — OXYCODONE HYDROCHLORIDE 10 MILLIGRAM(S): 5 TABLET ORAL at 12:30

## 2018-06-20 ENCOUNTER — APPOINTMENT (OUTPATIENT)
Dept: GASTROENTEROLOGY | Facility: CLINIC | Age: 83
End: 2018-06-20
Payer: MEDICARE

## 2018-06-20 VITALS
HEIGHT: 63 IN | WEIGHT: 121 LBS | BODY MASS INDEX: 21.44 KG/M2 | HEART RATE: 98 BPM | DIASTOLIC BLOOD PRESSURE: 82 MMHG | SYSTOLIC BLOOD PRESSURE: 103 MMHG

## 2018-06-20 DIAGNOSIS — Z80.49 FAMILY HISTORY OF MALIGNANT NEOPLASM OF OTHER GENITAL ORGANS: ICD-10-CM

## 2018-06-20 DIAGNOSIS — Z82.49 FAMILY HISTORY OF ISCHEMIC HEART DISEASE AND OTHER DISEASES OF THE CIRCULATORY SYSTEM: ICD-10-CM

## 2018-06-20 DIAGNOSIS — M81.0 AGE-RELATED OSTEOPOROSIS W/OUT CURRENT PATHOLOGICAL FRACTURE: ICD-10-CM

## 2018-06-20 DIAGNOSIS — M48.00 SPINAL STENOSIS, SITE UNSPECIFIED: ICD-10-CM

## 2018-06-20 DIAGNOSIS — D49.0 NEOPLASM OF UNSPECIFIED BEHAVIOR OF DIGESTIVE SYSTEM: ICD-10-CM

## 2018-06-20 DIAGNOSIS — E03.9 HYPOTHYROIDISM, UNSPECIFIED: ICD-10-CM

## 2018-06-20 DIAGNOSIS — Z86.59 PERSONAL HISTORY OF OTHER MENTAL AND BEHAVIORAL DISORDERS: ICD-10-CM

## 2018-06-20 DIAGNOSIS — Z86.79 PERSONAL HISTORY OF OTHER DISEASES OF THE CIRCULATORY SYSTEM: ICD-10-CM

## 2018-06-20 DIAGNOSIS — Z87.19 PERSONAL HISTORY OF OTHER DISEASES OF THE DIGESTIVE SYSTEM: ICD-10-CM

## 2018-06-20 PROCEDURE — 99205 OFFICE O/P NEW HI 60 MIN: CPT

## 2018-06-20 RX ORDER — ACETAMINOPHEN AND CODEINE PHOSPHATE 300; 60 MG/1; MG/1
300-60 TABLET ORAL
Refills: 0 | Status: ACTIVE | COMMUNITY

## 2018-07-31 ENCOUNTER — APPOINTMENT (OUTPATIENT)
Dept: GASTROENTEROLOGY | Facility: CLINIC | Age: 83
End: 2018-07-31
Payer: MEDICARE

## 2018-07-31 VITALS
WEIGHT: 123 LBS | HEART RATE: 93 BPM | DIASTOLIC BLOOD PRESSURE: 82 MMHG | HEIGHT: 63 IN | BODY MASS INDEX: 21.79 KG/M2 | SYSTOLIC BLOOD PRESSURE: 138 MMHG

## 2018-07-31 DIAGNOSIS — K62.5 HEMORRHAGE OF ANUS AND RECTUM: ICD-10-CM

## 2018-07-31 DIAGNOSIS — K59.09 OTHER CONSTIPATION: ICD-10-CM

## 2018-07-31 DIAGNOSIS — K57.30 DIVERTICULOSIS OF LARGE INTESTINE W/OUT PERFORATION OR ABSCESS W/OUT BLEEDING: ICD-10-CM

## 2018-07-31 PROCEDURE — 99214 OFFICE O/P EST MOD 30 MIN: CPT

## 2019-01-10 PROBLEM — I10 ESSENTIAL (PRIMARY) HYPERTENSION: Chronic | Status: ACTIVE | Noted: 2018-06-15

## 2019-01-10 PROBLEM — M48.00 SPINAL STENOSIS, SITE UNSPECIFIED: Chronic | Status: ACTIVE | Noted: 2018-06-15

## 2019-01-10 PROBLEM — I63.9 CEREBRAL INFARCTION, UNSPECIFIED: Chronic | Status: ACTIVE | Noted: 2018-06-15

## 2019-01-10 PROBLEM — M79.7 FIBROMYALGIA: Chronic | Status: ACTIVE | Noted: 2018-06-15

## 2019-01-31 ENCOUNTER — APPOINTMENT (OUTPATIENT)
Dept: OPHTHALMOLOGY | Facility: CLINIC | Age: 84
End: 2019-01-31
Payer: MEDICARE

## 2019-01-31 DIAGNOSIS — H35.30 UNSPECIFIED MACULAR DEGENERATION: ICD-10-CM

## 2019-01-31 DIAGNOSIS — H53.143 VISUAL DISCOMFORT, BILATERAL: ICD-10-CM

## 2019-01-31 DIAGNOSIS — H04.123 DRY EYE SYNDROME OF BILATERAL LACRIMAL GLANDS: ICD-10-CM

## 2019-01-31 DIAGNOSIS — H53.30 UNSPECIFIED DISORDER OF BINOCULAR VISION: ICD-10-CM

## 2019-01-31 PROCEDURE — 92014 COMPRE OPH EXAM EST PT 1/>: CPT | Mod: 25

## 2019-01-31 PROCEDURE — 68761 CLOSE TEAR DUCT OPENING: CPT | Mod: 50

## 2019-08-06 ENCOUNTER — NON-APPOINTMENT (OUTPATIENT)
Age: 84
End: 2019-08-06

## 2019-08-06 ENCOUNTER — APPOINTMENT (OUTPATIENT)
Dept: OPHTHALMOLOGY | Facility: CLINIC | Age: 84
End: 2019-08-06
Payer: MEDICARE

## 2019-08-06 PROCEDURE — 92012 INTRM OPH EXAM EST PATIENT: CPT

## 2019-08-15 ENCOUNTER — APPOINTMENT (OUTPATIENT)
Dept: OPHTHALMOLOGY | Facility: CLINIC | Age: 84
End: 2019-08-15
Payer: MEDICARE

## 2019-08-15 ENCOUNTER — NON-APPOINTMENT (OUTPATIENT)
Age: 84
End: 2019-08-15

## 2019-08-15 PROCEDURE — 92083 EXTENDED VISUAL FIELD XM: CPT

## 2019-08-15 PROCEDURE — 92014 COMPRE OPH EXAM EST PT 1/>: CPT

## 2019-10-03 ENCOUNTER — NON-APPOINTMENT (OUTPATIENT)
Age: 84
End: 2019-10-03

## 2019-10-03 ENCOUNTER — APPOINTMENT (OUTPATIENT)
Dept: OPHTHALMOLOGY | Facility: CLINIC | Age: 84
End: 2019-10-03
Payer: MEDICARE

## 2019-10-03 PROCEDURE — 92012 INTRM OPH EXAM EST PATIENT: CPT

## 2019-12-13 NOTE — PROVIDER CONTACT NOTE (MEDICATION) - DATE AND TIME:
REASON FOR VISIT:  Right heel pain  Left toe pain of third fourth and fifth toe  Chronic lumbar sacral pain    HISTORY OF PRESENT ILLNESS:  As a chronic lumbar sacral pain for many years. Does have gout. Takes allopurinol 300 milligram per day. And she states that she has no pain at all during the daytime even walking. But at nighttime she gets terrific pain in her right heel and left 3 toes. And her back is even more sore than it was in the morning. She does take Aleve twice a day. Is taking allopurinol, her last uric acid done just a couple of months ago was 3.1.    Current Outpatient Medications   Medication Sig Dispense Refill   • colesevelam (WELCHOL) 625 MG tablet Take 2 tablets by mouth 3 times daily 540 tablet 0   • triamcinolone (ARISTOCORT) 0.1 % cream Apply to affected area  BID PRN 45 g 2   • naproxen sodium (ALEVE) 220 MG tablet Take 220 mg by mouth 2 times daily (with meals).     • folic acid (FOLATE) 1 MG tablet TAKE 1 TABLET BY mouth every day 90 tablet 0   • allopurinol (ZYLOPRIM) 300 MG tablet Take 1 tablet by mouth daily. 90 tablet 1   • bisoprolol-hydrochlorothiazide (ZIAC) 10-6.25 MG per tablet Take 2 tablets by mouth daily. 180 tablet 1   • Magnesium Cl-Calcium Carbonate (SLOW-MAG) 71.5-119 MG Tablet Enteric Coated Take 1 tablet by mouth 2 times daily. 180 tablet 1   • Polyethyl Glycol-Propyl Glycol (SYSTANE) 0.4-0.3 % Solution Place 1 drop into both eyes daily.     • Cholecalciferol (VITAMIN D-3 PO) Take 1 tablet by mouth daily.     • Cyanocobalamin (VITAMIN B-12 PO) Take 1 tablet by mouth daily.     • POTASSIUM CHLORIDE PO Take 1 tablet by mouth daily.     • aspirin 81 MG tablet Take 162 mg by mouth daily.     • Multiple Vitamins-Minerals (PRESERVISION AREDS 2) Cap Take 1 tablet by mouth 2 times daily.       No current facility-administered medications for this visit.      ALLERGIES:  No Known Allergies      REVIEW OF SYSTEMS:  All ROS negative except as mentioned in the HPI.      PHYSICAL  EXAM:  Visit Vitals  /70   Pulse 68   Ht 5' 3\" (1.6 m)   Wt 63 kg   BMI 24.60 kg/m²     Tender to palpation over lumbar spine, mostly midline. But does have some left sciatica. Her left 3 toes are tender to palpation. But they did have fairly decent range of motion for someone 85 years old. Her left heel is tender. And she may have a spur on there. Or possibly mild plantar fasciitis      ASSESSMENT:  Pain of right heel  (primary encounter diagnosis)  Comment: As needed  Plan: XR FOOT 3+ VW RIGHT        Negative by me    Pain of toe of left foot  Comment: As needed  Plan: XR FOOT 3+ VW LEFT        Negative by me    Chronic low back pain with left-sided sciatica, unspecified back pain laterality  Comment: As needed  Plan: XR LUMBAR SPINE 2 OR 3 VIEWS        levoscoliosis and subluxation with degenerative disc disease lumbar sacral spine      Orders Placed This Encounter   • XR Foot 3+ View Right   • XR Foot 3 + View Left   • XR Lumbar Spine 2 or 3 Views       No follow-ups on file.      PLAN:  Also needs her WelChol changed to colestipol 2 tablets 3 times a day    Will order MRI of lumbar spine prior to a cortisone injection  Supervising physician Dr. Jovani Vail.         16-Jun-2018 23:48

## 2020-09-18 NOTE — DISCHARGE NOTE ADULT - NSTOBACCONEVERSMOKERY/N_GEN_A
Consultation


Consult Date: 09/18/20


Attending physician:: TANISHA GARCIA


Provider Consulted: ALBERTINA TORRES


Consult reason:: abdominal pain, large ovarian mass





History of Present Illness


Admission Date/PCP: 


9/18/2020


Patient complains of: abdominal pain for 2 days


History of Present Illness: 


GYPSY KNOX is a 21 year old female G0 presents for 2 days history of pain 

and worsening last night - reports almost passed out from pain last night.  

presented to the ER late yesterday evening.  She report decrease appetite and 

5/5 pain.  She is being given pain meds in ER.  She has never had surgery before

and reports no other medical problems.





Past Medical History


Gynecological Infection: No





Social History


Information Source: Patient, Parent


Lives with: Family


Smoking Status: Never Smoker


Electronic Cigarette use?: No


Frequency of Alcohol Use: None


Hx Recreational Drug Use: No


Drugs: None


Hx Prescription Drug Abuse: No





Family History


Family History: CAD, DM, Other - mother has fibroids


Parental Family History Reviewed: No


Children Family History Reviewed: NA


Sibling(s) Family History Reviewed.: NA





Medication/Allergy


Home Medications: 








Cyclobenzaprine HCl [Flexeril 10 Mg Tablet] 10 mg PO Q6 PRN #15 tablet 07/04/12 


Ibuprofen [Motrin 800 Mg Tablet] 800 mg PO Q6 PRN #20 tablet 07/04/12 


No Home Medications 1 07/04/12 








Allergies/Adverse Reactions: 


                                        





No Known Allergies Allergy (Unverified 07/04/12 17:32)


   











Review of Systems


Constitutional: ABSENT: chills, fever(s), headache(s), weight gain, weight loss


Gastrointestinal: PRESENT: abdominal pain, nausea.  ABSENT: vomiting


Neurological: ABSENT: abnormal gait, abnormal speech, confusion, dizziness, 

focal weakness, syncope


Endocrine: ABSENT: cold intolerance, heat intolerance, polydipsia, polyuria


Hematologic/Lymphatic: ABSENT: easy bleeding, easy bruising





Physical Exam





- Physical Exam


Vital Signs: 


                                        











Temp Pulse Resp BP Pulse Ox


 


 98.5 F   72   20   107/71   100 


 


 09/18/20 03:29  09/18/20 03:29  09/18/20 03:29  09/18/20 03:29  09/18/20 03:29








                                 Intake & Output











 09/17/20 09/18/20 09/19/20





 06:59 06:59 06:59


 


Intake Total   1000


 


Balance   1000


 


Weight  110.2 kg 











General appearance: PRESENT: no acute distress, well-developed, well-nourished


Head exam: PRESENT: atraumatic, normocephalic


Respiratory exam: PRESENT: clear to auscultation brielle, symmetrical, unlabored


Cardiovascular exam: PRESENT: RRR.  ABSENT: diastolic murmur, rubs, systolic 

murmur


GI/Abdominal exam: PRESENT: tenderness


Rectal exam: PRESENT: deferred


Extremities exam: PRESENT: full ROM.  ABSENT: calf tenderness, clubbing, pedal 

edema


Neurological exam: PRESENT: alert, awake, oriented to person, oriented to place,

oriented to time, oriented to situation, CN II-XII grossly intact.  ABSENT: 

motor sensory deficit


Psychiatric exam: PRESENT: appropriate affect, normal mood.  ABSENT: homicidal 

ideation, suicidal ideation





Result


Laboratory Results: 


                                        





                                 09/18/20 03:37 





                                 09/18/20 03:37 





                                        











  09/18/20 09/18/20 09/18/20





  03:37 03:37 03:37


 


WBC  10.8 H  


 


RBC  4.73  


 


Hgb  13.5  


 


Hct  38.9  


 


MCV  82  


 


MCH  28.5  


 


MCHC  34.7  


 


RDW  13.9  


 


Plt Count  297  


 


Seg Neutrophils %  89.8 H  


 


Sodium   137.2 


 


Potassium   4.1 


 


Chloride   104 


 


Carbon Dioxide   21 L 


 


Anion Gap   12 


 


BUN   8 


 


Creatinine   0.67 


 


Est GFR ( Amer)   > 60 


 


Glucose   125 H 


 


Calcium   9.6 


 


Total Bilirubin   0.8 


 


AST   26 


 


Alkaline Phosphatase   72 


 


Total Protein   7.8 


 


Albumin   4.5 


 


Lipase   80.9 


 


Serum HCG, Qual    NEGATIVE


 


Urine Color   


 


Urine Appearance   


 


Urine pH   


 


Ur Specific Gravity   


 


Urine Protein   


 


Urine Glucose (UA)   


 


Urine Ketones   


 


Urine Blood   


 


Urine Nitrite   


 


Ur Leukocyte Esterase   


 


Urine WBC (Auto)   


 


Urine RBC (Auto)   














  09/18/20





  08:59


 


WBC 


 


RBC 


 


Hgb 


 


Hct 


 


MCV 


 


MCH 


 


MCHC 


 


RDW 


 


Plt Count 


 


Seg Neutrophils % 


 


Sodium 


 


Potassium 


 


Chloride 


 


Carbon Dioxide 


 


Anion Gap 


 


BUN 


 


Creatinine 


 


Est GFR (African Amer) 


 


Glucose 


 


Calcium 


 


Total Bilirubin 


 


AST 


 


Alkaline Phosphatase 


 


Total Protein 


 


Albumin 


 


Lipase 


 


Serum HCG, Qual 


 


Urine Color  YELLOW


 


Urine Appearance  SLIGHTLY-CLOUDY


 


Urine pH  5.0


 


Ur Specific Gravity  1.012


 


Urine Protein  NEGATIVE


 


Urine Glucose (UA)  NEGATIVE


 


Urine Ketones  20 H


 


Urine Blood  LARGE H


 


Urine Nitrite  NEGATIVE


 


Ur Leukocyte Esterase  TRACE H


 


Urine WBC (Auto)  6


 


Urine RBC (Auto)  1











Impressions: 


                                        





Transvaginal US  09/18/20 00:05


IMPRESSION:


There is a hypoechoic area at the right adnexa which demonstrates


some septations. This may represent an ovarian or adnexal


neoplasm such as a cystadenoma or cystadenocarcinoma. A


hydrosalpinx is felt to be less likely.


 








Abdomen/Pelvis CT  09/18/20 09:41


IMPRESSION:  Complex cystic lesion in the mid pelvis that measures 10.4 cm in AP

diameter, 10 cm in craniocaudal diameter and 12.8 cm in craniocaudal diameter ; 

the lesion contains eccentric ulcer calcifications likely represents the complex

cystic lesion described on the correlative ultrasound.  The favored differential

consideration is an ovarian dermoid cyst.


 











Status: Imported from PACS





Assessment & Plan





- Diagnosis


(1) Adnexal mass


Is this a current diagnosis for this admission?: Yes   


Plan: 


midpelvis probably right adnexal mass large 10-12 cm.  Pt with significant pain 

- medicated by ER.


Reviewed patient needs surgical intervention and needs to likely have ovary 

removed.


Concern that may not be a dermoid as it does not have all the features of a 

dermoid.  Concern for possible LMP.  Reviewed if find LMP may need to have 2nd 

interval surgery for lymph nodes etc. 


Patient mother works at [employer] and would prefer to transfer patient to [facility] 

for treatment.


WIll notify Sondra that family and patient request transfer.








- Time


Time Spent: 30 to 50 Minutes


Critical Time spent with patient: Less than 15 minutes


Medications reviewed and adjusted accordingly: No


Anticipated Discharge Disposition: Tertiary


Anticipated Discharge Timeframe: when bed available





- Inpatient Certification


Based on my medical assessment, after consideration of the patient's 

comorbidities, presenting symptoms, or acuity I expect that the services needed 

warrant INPATIENT care.: Yes


I certify that my determination is in accordance with my understanding of 

Medicare's requirements for reasonable and necessary INPATIENT services [42 CFR 

412.3e].: Yes


Medical Necessity: Need for Pain Control, Need for Surgery
No

## 2021-09-24 ENCOUNTER — APPOINTMENT (OUTPATIENT)
Dept: NEUROLOGY | Facility: CLINIC | Age: 86
End: 2021-09-24

## 2021-10-08 ENCOUNTER — EMERGENCY (EMERGENCY)
Facility: HOSPITAL | Age: 86
LOS: 1 days | Discharge: HOME | End: 2021-10-08
Attending: EMERGENCY MEDICINE
Payer: MEDICARE

## 2021-10-08 VITALS
OXYGEN SATURATION: 100 % | DIASTOLIC BLOOD PRESSURE: 76 MMHG | HEART RATE: 90 BPM | TEMPERATURE: 98 F | SYSTOLIC BLOOD PRESSURE: 148 MMHG | RESPIRATION RATE: 20 BRPM

## 2021-10-08 VITALS
HEART RATE: 95 BPM | HEIGHT: 64 IN | OXYGEN SATURATION: 97 % | RESPIRATION RATE: 20 BRPM | WEIGHT: 121.03 LBS | TEMPERATURE: 98 F | SYSTOLIC BLOOD PRESSURE: 172 MMHG | DIASTOLIC BLOOD PRESSURE: 82 MMHG

## 2021-10-08 LAB
ALBUMIN SERPL ELPH-MCNC: 4.9 G/DL — SIGNIFICANT CHANGE UP (ref 3.3–5)
ALP SERPL-CCNC: 53 U/L — SIGNIFICANT CHANGE UP (ref 40–120)
ALT FLD-CCNC: 12 U/L — SIGNIFICANT CHANGE UP (ref 10–45)
ANION GAP SERPL CALC-SCNC: 19 MMOL/L — HIGH (ref 5–17)
APTT BLD: 37 SEC — HIGH (ref 27.5–35.5)
AST SERPL-CCNC: 22 U/L — SIGNIFICANT CHANGE UP (ref 10–40)
BASOPHILS # BLD AUTO: 0.02 K/UL — SIGNIFICANT CHANGE UP (ref 0–0.2)
BASOPHILS NFR BLD AUTO: 0.3 % — SIGNIFICANT CHANGE UP (ref 0–2)
BILIRUB SERPL-MCNC: 0.8 MG/DL — SIGNIFICANT CHANGE UP (ref 0.2–1.2)
BUN SERPL-MCNC: 23 MG/DL — SIGNIFICANT CHANGE UP (ref 7–23)
CALCIUM SERPL-MCNC: 10.3 MG/DL — SIGNIFICANT CHANGE UP (ref 8.4–10.5)
CHLORIDE SERPL-SCNC: 94 MMOL/L — LOW (ref 96–108)
CO2 SERPL-SCNC: 22 MMOL/L — SIGNIFICANT CHANGE UP (ref 22–31)
CREAT SERPL-MCNC: 0.99 MG/DL — SIGNIFICANT CHANGE UP (ref 0.5–1.3)
EOSINOPHIL # BLD AUTO: 0.06 K/UL — SIGNIFICANT CHANGE UP (ref 0–0.5)
EOSINOPHIL NFR BLD AUTO: 0.8 % — SIGNIFICANT CHANGE UP (ref 0–6)
GLUCOSE SERPL-MCNC: 116 MG/DL — HIGH (ref 70–99)
HCT VFR BLD CALC: 42.7 % — SIGNIFICANT CHANGE UP (ref 34.5–45)
HGB BLD-MCNC: 14.7 G/DL — SIGNIFICANT CHANGE UP (ref 11.5–15.5)
IMM GRANULOCYTES NFR BLD AUTO: 0.3 % — SIGNIFICANT CHANGE UP (ref 0–1.5)
INR BLD: 1.23 RATIO — HIGH (ref 0.88–1.16)
LIDOCAIN IGE QN: 33 U/L — SIGNIFICANT CHANGE UP (ref 7–60)
LYMPHOCYTES # BLD AUTO: 1.51 K/UL — SIGNIFICANT CHANGE UP (ref 1–3.3)
LYMPHOCYTES # BLD AUTO: 19.7 % — SIGNIFICANT CHANGE UP (ref 13–44)
MCHC RBC-ENTMCNC: 30.1 PG — SIGNIFICANT CHANGE UP (ref 27–34)
MCHC RBC-ENTMCNC: 34.4 GM/DL — SIGNIFICANT CHANGE UP (ref 32–36)
MCV RBC AUTO: 87.5 FL — SIGNIFICANT CHANGE UP (ref 80–100)
MONOCYTES # BLD AUTO: 0.6 K/UL — SIGNIFICANT CHANGE UP (ref 0–0.9)
MONOCYTES NFR BLD AUTO: 7.8 % — SIGNIFICANT CHANGE UP (ref 2–14)
NEUTROPHILS # BLD AUTO: 5.45 K/UL — SIGNIFICANT CHANGE UP (ref 1.8–7.4)
NEUTROPHILS NFR BLD AUTO: 71.1 % — SIGNIFICANT CHANGE UP (ref 43–77)
NRBC # BLD: 0 /100 WBCS — SIGNIFICANT CHANGE UP (ref 0–0)
NT-PROBNP SERPL-SCNC: 586 PG/ML — HIGH (ref 0–300)
PLATELET # BLD AUTO: 220 K/UL — SIGNIFICANT CHANGE UP (ref 150–400)
POTASSIUM SERPL-MCNC: 3.2 MMOL/L — LOW (ref 3.5–5.3)
POTASSIUM SERPL-SCNC: 3.2 MMOL/L — LOW (ref 3.5–5.3)
PROT SERPL-MCNC: 7.6 G/DL — SIGNIFICANT CHANGE UP (ref 6–8.3)
PROTHROM AB SERPL-ACNC: 14.6 SEC — HIGH (ref 10.6–13.6)
RBC # BLD: 4.88 M/UL — SIGNIFICANT CHANGE UP (ref 3.8–5.2)
RBC # FLD: 11.9 % — SIGNIFICANT CHANGE UP (ref 10.3–14.5)
SARS-COV-2 RNA SPEC QL NAA+PROBE: SIGNIFICANT CHANGE UP
SODIUM SERPL-SCNC: 135 MMOL/L — SIGNIFICANT CHANGE UP (ref 135–145)
TROPONIN T, HIGH SENSITIVITY RESULT: 20 NG/L — SIGNIFICANT CHANGE UP (ref 0–51)
TROPONIN T, HIGH SENSITIVITY RESULT: 20 NG/L — SIGNIFICANT CHANGE UP (ref 0–51)
WBC # BLD: 7.66 K/UL — SIGNIFICANT CHANGE UP (ref 3.8–10.5)
WBC # FLD AUTO: 7.66 K/UL — SIGNIFICANT CHANGE UP (ref 3.8–10.5)

## 2021-10-08 PROCEDURE — 85025 COMPLETE CBC W/AUTO DIFF WBC: CPT

## 2021-10-08 PROCEDURE — 85610 PROTHROMBIN TIME: CPT

## 2021-10-08 PROCEDURE — 83880 ASSAY OF NATRIURETIC PEPTIDE: CPT

## 2021-10-08 PROCEDURE — 84484 ASSAY OF TROPONIN QUANT: CPT

## 2021-10-08 PROCEDURE — 71046 X-RAY EXAM CHEST 2 VIEWS: CPT

## 2021-10-08 PROCEDURE — 71046 X-RAY EXAM CHEST 2 VIEWS: CPT | Mod: 26

## 2021-10-08 PROCEDURE — U0005: CPT

## 2021-10-08 PROCEDURE — 80053 COMPREHEN METABOLIC PANEL: CPT

## 2021-10-08 PROCEDURE — 85730 THROMBOPLASTIN TIME PARTIAL: CPT

## 2021-10-08 PROCEDURE — U0003: CPT

## 2021-10-08 PROCEDURE — 99284 EMERGENCY DEPT VISIT MOD MDM: CPT | Mod: 25

## 2021-10-08 PROCEDURE — 83690 ASSAY OF LIPASE: CPT

## 2021-10-08 PROCEDURE — 93005 ELECTROCARDIOGRAM TRACING: CPT

## 2021-10-08 PROCEDURE — 99285 EMERGENCY DEPT VISIT HI MDM: CPT

## 2021-10-08 PROCEDURE — 36415 COLL VENOUS BLD VENIPUNCTURE: CPT

## 2021-10-08 PROCEDURE — 93010 ELECTROCARDIOGRAM REPORT: CPT

## 2021-10-08 NOTE — ED PROVIDER NOTE - PATIENT PORTAL LINK FT
You can access the FollowMyHealth Patient Portal offered by [facility] by registering at the following website: [email or web address]. By joining [employer]’s FollowMyHealth portal, you will also be able to view your health information using other applications (apps) compatible with our system.

## 2021-10-08 NOTE — ED PROVIDER NOTE - PROGRESS NOTE DETAILS
Juan Salgado MD:  Discussed case with PMD, patient has long-standing history of CHF with BNPs usually >5000, agrees not requiring admission at this time, will continue to see patient outpatient, informed PMD that patient takes 6-8 tylenol per day chronically, no elevated AST/ALt at this time.

## 2021-10-08 NOTE — ED PROVIDER NOTE - NSFOLLOWUPINSTRUCTIONS_ED_ALL_ED_FT
Congestive Heart Failure (CHF)    Congestive heart failure is a chronic condition in which the heart has trouble pumping blood. In some cases of heart failure, fluid may back up into your lungs or you may have swelling (edema) in your lower legs. There are many causes of heart failure including high blood pressure, coronary artery disease, abnormal heart valves, heart muscle disease, lung disease, diabetes, etc. Symptoms include shortness of breath with activity or when lying flat, cough, swelling of the legs, fatigue, or increased urination during the night.     Treatment is aimed at managing the symptoms of heart failure and may include lifestyle changes, medications, or surgical procedures. Take medicines only as directed by your health care provider and do not stop unless instructed to do so. Eat heart-healthy foods with low or no trans/saturated fats, cholesterol and salt. Weigh yourself every day for early recognition of fluid accumulation.    Continue taking all medications recommended by your primary care doctor, followup with her within the next 4-5 days to re-assess your chest pain.    SEEK IMMEDIATE MEDICAL CARE IF YOU HAVE ANY OF THE FOLLOWING SYMPTOMS: lightheadedness/dizziness/fainting, or worsening of symptoms including not being able to conduct normal physical activity.

## 2021-10-08 NOTE — ED PROVIDER NOTE - ATTENDING CONTRIBUTION TO CARE
89 yo F non smoker with pmh of h/o HTN, fibromyalgia, spinal stenosis on chronic pain medications, afib on eliquis, CVA, chronic chest pain and sob who is presenting with chest pain and ellis since yesterday told by her cards to come in, cardiac work up ordered, discuss with her cards.

## 2021-10-08 NOTE — ED PROVIDER NOTE - CLINICAL SUMMARY MEDICAL DECISION MAKING FREE TEXT BOX
Joseph Frankel PGY3: 89 yo with one episode of worsening chronic chest pain. VSS. Patient looks well and is non toxic appearing. PE as above. Most likely related to chronic chest pain as pain very atypical and has been ongoing for years per patient. However must consider ACS. Unlikely PE given story and already on blood thinners. Will get labs, imaging, and reassess.

## 2021-10-08 NOTE — ED ADULT NURSE REASSESSMENT NOTE - NS ED NURSE REASSESS COMMENT FT1
Report received from CAROL Barrett. Pt AAOx3, NAD, resp nonlabored, skin warm/dry, resting comfortably in bed with call bell at bedside. Pt c/o chest pain, but states it is not as severe as when she arrived. Pt denies headache, dizziness, palpitations, SOB, abd pain, n/v/d, urinary symptoms, fevers, chills at this time. Pt awaiting cards c/s. Safety maintained.

## 2021-10-08 NOTE — ED PROVIDER NOTE - OBJECTIVE STATEMENT
89 yo F non smoker with pmh of h/o HTN, fibromyalgia, spinal stenosis on chronic pain medications, afib on eliquis, CVA, chronic chest pain and sob who is presenting with chest pain. Is "pressure like". Non radiating. Most times mild severity. Yesterday patient had same chest pain but more severe in nature. Called her cardiologist who told her to come in yesterday but patient did not want to and decided to come today. States she still has chest pain but it is at the severity of her chronic chest pain. Not exertional and not relieved with rest. Endorses chronic sob which has not worsened recently. Denies pleuritic chest pain, ho clots, unilateral leg swelling, recent travel, n/v, diaphoresis, coughing, fevers.

## 2021-10-08 NOTE — ED ADULT NURSE NOTE - NSIMPLEMENTINTERV_GEN_ALL_ED
Implemented All Fall with Harm Risk Interventions:  [place] to call system. Call bell, personal items and telephone within reach. Instruct patient to call for assistance. Room bathroom lighting operational. Non-slip footwear when patient is off stretcher. Physically safe environment: no spills, clutter or unnecessary equipment. Stretcher in lowest position, wheels locked, appropriate side rails in place. Provide visual cue, wrist band, yellow gown, etc. Monitor gait and stability. Monitor for mental status changes and reorient to person, place, and time. Review medications for side effects contributing to fall risk. Reinforce activity limits and safety measures with patient and family. Provide visual clues: red socks.

## 2021-10-08 NOTE — ED ADULT NURSE NOTE - OBJECTIVE STATEMENT
89 yo female with a PMH of HTN, fibromyalgia, hypothyroidism, pinged nerves in cervical presents to the ED via waiting room from home complaining of chest pain that started yesterday. States that she was trying to go to bed when she felt a sudden onset of "crushing chest pain" that lasted about 2H. States that she always has chest pain "but this is something different." States PCP urged her today to go to the ER. Usually takes codeine around the clock for her nerve pain, took 30 minutes ago in the [place]. MD Steen at bedside evaluating patient. Denies headache, dizziness, vision changes, shortness of breath, abdominal pain, nausea, vomiting, diarrhea, fevers, chills, dysuria, hematuria, recent illness travel or fall.

## 2021-10-08 NOTE — ED PROVIDER NOTE - NS ED ROS FT
Gen: Denies fever, chills, generalized weakness  CV: + chest pain, denies palpitations  HEENT: Denies neck pain, headache, vision changes  Skin: Denies rash, erythema, color changes  Resp: + SOB, denies cough  Endo: Denies sensitivity to heat, cold, increased urination  GI: Denies constipation, nausea, vomiting, diarrhea  Msk: Denies back pain, LE swelling, extremity pain  : Denies dysuria, increased frequency  Neuro: Denies LOC, focal weakness, numbness, tingling  Psych: Denies hx of psych, SI, HI

## 2021-11-27 ENCOUNTER — EMERGENCY (EMERGENCY)
Facility: HOSPITAL | Age: 86
LOS: 1 days | Discharge: HOME | End: 2021-11-27
Attending: EMERGENCY MEDICINE
Payer: MEDICARE

## 2021-11-27 VITALS
DIASTOLIC BLOOD PRESSURE: 100 MMHG | HEIGHT: 64 IN | TEMPERATURE: 98 F | RESPIRATION RATE: 20 BRPM | SYSTOLIC BLOOD PRESSURE: 170 MMHG | WEIGHT: 121.92 LBS | HEART RATE: 62 BPM | OXYGEN SATURATION: 99 %

## 2021-11-27 LAB
BASOPHILS # BLD AUTO: 0.03 K/UL — SIGNIFICANT CHANGE UP (ref 0–0.2)
BASOPHILS NFR BLD AUTO: 0.4 % — SIGNIFICANT CHANGE UP (ref 0–2)
EOSINOPHIL # BLD AUTO: 0.09 K/UL — SIGNIFICANT CHANGE UP (ref 0–0.5)
EOSINOPHIL NFR BLD AUTO: 1.1 % — SIGNIFICANT CHANGE UP (ref 0–6)
HCT VFR BLD CALC: 43.4 % — SIGNIFICANT CHANGE UP (ref 34.5–45)
HGB BLD-MCNC: 15.1 G/DL — SIGNIFICANT CHANGE UP (ref 11.5–15.5)
IMM GRANULOCYTES NFR BLD AUTO: 0.4 % — SIGNIFICANT CHANGE UP (ref 0–1.5)
LYMPHOCYTES # BLD AUTO: 2.05 K/UL — SIGNIFICANT CHANGE UP (ref 1–3.3)
LYMPHOCYTES # BLD AUTO: 24.6 % — SIGNIFICANT CHANGE UP (ref 13–44)
MCHC RBC-ENTMCNC: 30.8 PG — SIGNIFICANT CHANGE UP (ref 27–34)
MCHC RBC-ENTMCNC: 34.8 GM/DL — SIGNIFICANT CHANGE UP (ref 32–36)
MCV RBC AUTO: 88.6 FL — SIGNIFICANT CHANGE UP (ref 80–100)
MONOCYTES # BLD AUTO: 0.71 K/UL — SIGNIFICANT CHANGE UP (ref 0–0.9)
MONOCYTES NFR BLD AUTO: 8.5 % — SIGNIFICANT CHANGE UP (ref 2–14)
NEUTROPHILS # BLD AUTO: 5.42 K/UL — SIGNIFICANT CHANGE UP (ref 1.8–7.4)
NEUTROPHILS NFR BLD AUTO: 65 % — SIGNIFICANT CHANGE UP (ref 43–77)
NRBC # BLD: 0 /100 WBCS — SIGNIFICANT CHANGE UP (ref 0–0)
PLATELET # BLD AUTO: 253 K/UL — SIGNIFICANT CHANGE UP (ref 150–400)
RBC # BLD: 4.9 M/UL — SIGNIFICANT CHANGE UP (ref 3.8–5.2)
RBC # FLD: 13.3 % — SIGNIFICANT CHANGE UP (ref 10.3–14.5)
WBC # BLD: 8.33 K/UL — SIGNIFICANT CHANGE UP (ref 3.8–10.5)
WBC # FLD AUTO: 8.33 K/UL — SIGNIFICANT CHANGE UP (ref 3.8–10.5)

## 2021-11-27 PROCEDURE — 93010 ELECTROCARDIOGRAM REPORT: CPT

## 2021-11-27 PROCEDURE — 99285 EMERGENCY DEPT VISIT HI MDM: CPT | Mod: CS

## 2021-11-27 PROCEDURE — 71046 X-RAY EXAM CHEST 2 VIEWS: CPT | Mod: 26

## 2021-11-27 NOTE — ED ADULT NURSE NOTE - NSIMPLEMENTINTERV_GEN_ALL_ED
Implemented All Fall with Harm Risk Interventions:  [place] to call system. Call bell, personal items and telephone within reach. Instruct patient to call for assistance. Room bathroom lighting operational. Non-slip footwear when patient is off stretcher. Physically safe environment: no spills, clutter or unnecessary equipment. Stretcher in lowest position, wheels locked, appropriate side rails in place. Provide visual cue, wrist band, yellow gown, etc. Monitor gait and stability. Monitor for mental status changes and reorient to person, place, and time. Review medications for side effects contributing to fall risk. Reinforce activity limits and safety measures with patient and family. Provide visual clues: red socks.

## 2021-11-27 NOTE — ED PROVIDER NOTE - PHYSICAL EXAMINATION
[Const] well-appearing, resting comfortably, no acute distress  [HEENT] PERRL, EOMI, moist mucus membranes  [Neck] Supple, trachea midline  [CV] +S1/S2, no m/r/g appreciated  [Lungs] Clear to auscultations bilaterally, no adventitious lung sounds  [Abd] soft, non-tender, nondistended in all 4 quadrants  [MSK] 5/5 upper extremity and lower extremity str bilaterally  [Skin] warm, dry, well-perfused  [Neuro] A&Ox3, gait intact  [Psych] very anxious

## 2021-11-27 NOTE — ED PROVIDER NOTE - PROGRESS NOTE DETAILS
Matt, PGY-3: paged [facility] cards x2, no response. paged [facility] hospitalist who suggests cdu and cards eval in AM. trop x2, stent 1 week ago, bnp doubled, subjective SOB. pt requests d/c, feels fine, does not want cdu or admission has, outpatient f/u and upcoming esophageal echo she is supposed to get.

## 2021-11-27 NOTE — ED ADULT NURSE NOTE - OBJECTIVE STATEMENT
89 y old female with PMH HTN, afib on Eliquis, spinal stenosis, CVA, fibromyalgia, 2 cardiac stents and hypothyroidism presents to the ED from home c/o chest pain and SOB x several days. Pt reports chest pain is a mild, nonradiating pressure. Pt reports SOB occurs even at rest. Denies HA, fevers, chills, abd pain, n/v/d, weakness. Pt A&Ox3 and anxious. Respirations spontaneous and unlabored. Peripheral pulses strong. Skin warm, dry and intact. Bed in lowest position, side rails up and call bell in reach. Pt in NSR on cardiac monitor.

## 2021-11-27 NOTE — ED PROVIDER NOTE - CHIEF COMPLAINT
Left voicemail message with CM contact information and requesting for a call back.      
The patient is a 89y Female complaining of

## 2021-11-27 NOTE — ED PROVIDER NOTE - OBJECTIVE STATEMENT
89 yo F non smoker with pmh of h/o HTN, fibromyalgia, spinal stenosis on chronic pain medications, afib on eliquis, CVA, hx x2 cardiac stents, chronic chest pain and sob who is presenting with chest pain. Is "pressure like". Non radiating. Most times mild severity. Pt very anxious. Recent ED visit for same complaint. Denies pleuritic chest pain, ho clots, unilateral leg swelling, recent travel, n/v, diaphoresis, coughing, fevers.

## 2021-11-27 NOTE — ED PROVIDER NOTE - PATIENT PORTAL LINK FT
You can access the FollowMyHealth Patient Portal offered by [facility] by registering at the following website: [email or web address]. By joining [employer]’s FollowMyHealth portal, you will also be able to view your health information using other applications (apps) compatible with our system.

## 2021-11-27 NOTE — ED PROVIDER NOTE - ATTENDING CONTRIBUTION TO CARE
------------ATTENDING NOTE------------  pt w/ daughter c/o being short of breath w/ vague left chest pain for "some time", she is very anxious, she [id number] pain as uncomfortable in left side of chest, no nausea/diaphoresis or palpitations, no edema/calf tenderness, no fevers/cough, clear chest w/o distress, nml cardiac exam w/ equal dsital pulses, soft benign abd, nml VS at rest, she has been compliant w/ medications, awaiting labs/imaging and close reassessments -->  - Bigg Meng MD   --------------------------------------------- ------------ATTENDING NOTE------------  pt w/ daughter c/o being short of breath w/ vague left chest pain for "some time", she is very anxious, she [id number] pain as uncomfortable in left side of chest, no nausea/diaphoresis or palpitations, no edema/calf tenderness, no fevers/cough, clear chest w/o distress, nml cardiac exam w/ equal dsital pulses, soft benign abd, nml VS at rest, she has been compliant w/ medications, awaiting labs/imaging and close reassessments --> remained stable, NSR, nml VS, tolerating PO, ambulating well at her baseline, labs/imaging wnl, in depth dw all about ddx, tx, childs, continued close outpt fu.  - Bigg Meng MD   ---------------------------------------------

## 2021-11-27 NOTE — ED PROVIDER NOTE - NSFOLLOWUPINSTRUCTIONS_ED_ALL_ED_FT
You were seen in the Emergency Department for shortness of breath  Today you had bloodwork,  the results are attached.    Please follow-up with your cardiologist within the next few days    1) Advance activity as tolerated.    2) Continue all previously prescribed medications as directed.    3) Follow up with your primary care physician in 24-48 hours - take copies of your results.    4) Return to the Emergency Department for worsening or persistent symptoms, and/or ANY NEW OR CONCERNING SYMPTOMS as described below.    Shortness of breath    Shortness of breath (dyspnea) means you have trouble breathing and could indicate a medical problem. Causes include lung disease, heart disease, low amount of red blood cells (anemia), poor physical fitness, being overweight, smoking, etc. Your health care provider today may not be able to find a cause for your shortness of breath after your exam. In this case, it is important to have a follow-up exam with your primary care physician as instructed. If medicines were prescribed, take them as directed for the full length of time directed. Refrain from tobacco products.    SEEK IMMEDIATE MEDICAL CARE IF YOU HAVE ANY OF THE FOLLOWING SYMPTOMS: worsening shortness of breath, chest pain, back pain, abdominal pain, fever, coughing up blood, lightheadedness/dizziness.

## 2021-11-28 VITALS
TEMPERATURE: 98 F | DIASTOLIC BLOOD PRESSURE: 87 MMHG | HEART RATE: 62 BPM | OXYGEN SATURATION: 100 % | RESPIRATION RATE: 18 BRPM | SYSTOLIC BLOOD PRESSURE: 128 MMHG

## 2021-11-28 LAB
ALBUMIN SERPL ELPH-MCNC: 4.9 G/DL — SIGNIFICANT CHANGE UP (ref 3.3–5)
ALP SERPL-CCNC: 54 U/L — SIGNIFICANT CHANGE UP (ref 40–120)
ALT FLD-CCNC: 12 U/L — SIGNIFICANT CHANGE UP (ref 10–45)
ANION GAP SERPL CALC-SCNC: 16 MMOL/L — SIGNIFICANT CHANGE UP (ref 5–17)
AST SERPL-CCNC: 18 U/L — SIGNIFICANT CHANGE UP (ref 10–40)
BILIRUB SERPL-MCNC: 1.2 MG/DL — SIGNIFICANT CHANGE UP (ref 0.2–1.2)
BUN SERPL-MCNC: 26 MG/DL — HIGH (ref 7–23)
CALCIUM SERPL-MCNC: 10 MG/DL — SIGNIFICANT CHANGE UP (ref 8.4–10.5)
CHLORIDE SERPL-SCNC: 103 MMOL/L — SIGNIFICANT CHANGE UP (ref 96–108)
CO2 SERPL-SCNC: 22 MMOL/L — SIGNIFICANT CHANGE UP (ref 22–31)
CREAT SERPL-MCNC: 1.09 MG/DL — SIGNIFICANT CHANGE UP (ref 0.5–1.3)
GLUCOSE SERPL-MCNC: 101 MG/DL — HIGH (ref 70–99)
MAGNESIUM SERPL-MCNC: 2.1 MG/DL — SIGNIFICANT CHANGE UP (ref 1.6–2.6)
NT-PROBNP SERPL-SCNC: 1326 PG/ML — HIGH (ref 0–300)
POTASSIUM SERPL-MCNC: 3.5 MMOL/L — SIGNIFICANT CHANGE UP (ref 3.5–5.3)
POTASSIUM SERPL-SCNC: 3.5 MMOL/L — SIGNIFICANT CHANGE UP (ref 3.5–5.3)
PROCALCITONIN SERPL-MCNC: <0.03 NG/ML — SIGNIFICANT CHANGE UP (ref 0.02–0.1)
PROT SERPL-MCNC: 7.4 G/DL — SIGNIFICANT CHANGE UP (ref 6–8.3)
RAPID RVP RESULT: SIGNIFICANT CHANGE UP
SARS-COV-2 RNA SPEC QL NAA+PROBE: SIGNIFICANT CHANGE UP
SODIUM SERPL-SCNC: 141 MMOL/L — SIGNIFICANT CHANGE UP (ref 135–145)
TROPONIN T, HIGH SENSITIVITY RESULT: 20 NG/L — SIGNIFICANT CHANGE UP (ref 0–51)
TROPONIN T, HIGH SENSITIVITY RESULT: 20 NG/L — SIGNIFICANT CHANGE UP (ref 0–51)
TSH SERPL-MCNC: 2.91 UIU/ML — SIGNIFICANT CHANGE UP (ref 0.27–4.2)

## 2021-11-28 PROCEDURE — 99284 EMERGENCY DEPT VISIT MOD MDM: CPT | Mod: 25

## 2021-11-28 PROCEDURE — 83735 ASSAY OF MAGNESIUM: CPT

## 2021-11-28 PROCEDURE — 0225U NFCT DS DNA&RNA 21 SARSCOV2: CPT

## 2021-11-28 PROCEDURE — 83880 ASSAY OF NATRIURETIC PEPTIDE: CPT

## 2021-11-28 PROCEDURE — 84484 ASSAY OF TROPONIN QUANT: CPT

## 2021-11-28 PROCEDURE — 71046 X-RAY EXAM CHEST 2 VIEWS: CPT

## 2021-11-28 PROCEDURE — 93005 ELECTROCARDIOGRAM TRACING: CPT

## 2021-11-28 PROCEDURE — 36415 COLL VENOUS BLD VENIPUNCTURE: CPT

## 2021-11-28 PROCEDURE — 80053 COMPREHEN METABOLIC PANEL: CPT

## 2021-11-28 PROCEDURE — 84145 PROCALCITONIN (PCT): CPT

## 2021-11-28 PROCEDURE — 85025 COMPLETE CBC W/AUTO DIFF WBC: CPT

## 2021-11-28 PROCEDURE — 84443 ASSAY THYROID STIM HORMONE: CPT

## 2022-03-22 ENCOUNTER — INPATIENT (INPATIENT)
Facility: HOSPITAL | Age: 87
LOS: 3 days | Discharge: HOME HEALTH | End: 2022-03-26
Attending: HOSPITALIST | Admitting: HOSPITALIST
Payer: MEDICARE

## 2022-03-22 VITALS
DIASTOLIC BLOOD PRESSURE: 75 MMHG | TEMPERATURE: 98 F | HEART RATE: 121 BPM | SYSTOLIC BLOOD PRESSURE: 122 MMHG | RESPIRATION RATE: 18 BRPM | HEIGHT: 64 IN | OXYGEN SATURATION: 97 %

## 2022-03-22 DIAGNOSIS — Z29.9 ENCOUNTER FOR PROPHYLACTIC MEASURES, UNSPECIFIED: ICD-10-CM

## 2022-03-22 DIAGNOSIS — I48.91 UNSPECIFIED ATRIAL FIBRILLATION: ICD-10-CM

## 2022-03-22 DIAGNOSIS — I25.10 ATHEROSCLEROTIC HEART DISEASE OF NATIVE CORONARY ARTERY WITHOUT ANGINA PECTORIS: ICD-10-CM

## 2022-03-22 DIAGNOSIS — F41.9 ANXIETY DISORDER, UNSPECIFIED: ICD-10-CM

## 2022-03-22 DIAGNOSIS — M48.00 SPINAL STENOSIS, SITE UNSPECIFIED: ICD-10-CM

## 2022-03-22 DIAGNOSIS — E03.9 HYPOTHYROIDISM, UNSPECIFIED: ICD-10-CM

## 2022-03-22 LAB
ALBUMIN SERPL ELPH-MCNC: 4.9 G/DL — SIGNIFICANT CHANGE UP (ref 3.3–5)
ALP SERPL-CCNC: 81 U/L — SIGNIFICANT CHANGE UP (ref 40–120)
ALT FLD-CCNC: 19 U/L — SIGNIFICANT CHANGE UP (ref 4–33)
ANION GAP SERPL CALC-SCNC: 15 MMOL/L — HIGH (ref 7–14)
APPEARANCE UR: CLEAR — SIGNIFICANT CHANGE UP
AST SERPL-CCNC: 34 U/L — HIGH (ref 4–32)
BASOPHILS # BLD AUTO: 0.02 K/UL — SIGNIFICANT CHANGE UP (ref 0–0.2)
BASOPHILS NFR BLD AUTO: 0.2 % — SIGNIFICANT CHANGE UP (ref 0–2)
BILIRUB SERPL-MCNC: 1 MG/DL — SIGNIFICANT CHANGE UP (ref 0.2–1.2)
BILIRUB UR-MCNC: NEGATIVE — SIGNIFICANT CHANGE UP
BLD GP AB SCN SERPL QL: NEGATIVE — SIGNIFICANT CHANGE UP
BLOOD GAS VENOUS COMPREHENSIVE RESULT: SIGNIFICANT CHANGE UP
BUN SERPL-MCNC: 19 MG/DL — SIGNIFICANT CHANGE UP (ref 7–23)
CALCIUM SERPL-MCNC: 10.1 MG/DL — SIGNIFICANT CHANGE UP (ref 8.4–10.5)
CHLORIDE SERPL-SCNC: 95 MMOL/L — LOW (ref 98–107)
CO2 SERPL-SCNC: 23 MMOL/L — SIGNIFICANT CHANGE UP (ref 22–31)
COLOR SPEC: COLORLESS — SIGNIFICANT CHANGE UP
CREAT SERPL-MCNC: 0.82 MG/DL — SIGNIFICANT CHANGE UP (ref 0.5–1.3)
DIFF PNL FLD: NEGATIVE — SIGNIFICANT CHANGE UP
EGFR: 66 ML/MIN/1.73M2 — SIGNIFICANT CHANGE UP
EOSINOPHIL # BLD AUTO: 0.04 K/UL — SIGNIFICANT CHANGE UP (ref 0–0.5)
EOSINOPHIL NFR BLD AUTO: 0.4 % — SIGNIFICANT CHANGE UP (ref 0–6)
GLUCOSE SERPL-MCNC: 132 MG/DL — HIGH (ref 70–99)
GLUCOSE UR QL: NEGATIVE — SIGNIFICANT CHANGE UP
HCT VFR BLD CALC: 45.2 % — HIGH (ref 34.5–45)
HGB BLD-MCNC: 15.7 G/DL — HIGH (ref 11.5–15.5)
IANC: 7.84 K/UL — SIGNIFICANT CHANGE UP (ref 1.5–8.5)
IMM GRANULOCYTES NFR BLD AUTO: 0.5 % — SIGNIFICANT CHANGE UP (ref 0–1.5)
KETONES UR-MCNC: NEGATIVE — SIGNIFICANT CHANGE UP
LEUKOCYTE ESTERASE UR-ACNC: NEGATIVE — SIGNIFICANT CHANGE UP
LIDOCAIN IGE QN: 26 U/L — SIGNIFICANT CHANGE UP (ref 7–60)
LYMPHOCYTES # BLD AUTO: 1.39 K/UL — SIGNIFICANT CHANGE UP (ref 1–3.3)
LYMPHOCYTES # BLD AUTO: 13.9 % — SIGNIFICANT CHANGE UP (ref 13–44)
MAGNESIUM SERPL-MCNC: 1.9 MG/DL — SIGNIFICANT CHANGE UP (ref 1.6–2.6)
MCHC RBC-ENTMCNC: 30.4 PG — SIGNIFICANT CHANGE UP (ref 27–34)
MCHC RBC-ENTMCNC: 34.7 GM/DL — SIGNIFICANT CHANGE UP (ref 32–36)
MCV RBC AUTO: 87.6 FL — SIGNIFICANT CHANGE UP (ref 80–100)
MONOCYTES # BLD AUTO: 0.66 K/UL — SIGNIFICANT CHANGE UP (ref 0–0.9)
MONOCYTES NFR BLD AUTO: 6.6 % — SIGNIFICANT CHANGE UP (ref 2–14)
NEUTROPHILS # BLD AUTO: 7.84 K/UL — HIGH (ref 1.8–7.4)
NEUTROPHILS NFR BLD AUTO: 78.4 % — HIGH (ref 43–77)
NITRITE UR-MCNC: NEGATIVE — SIGNIFICANT CHANGE UP
NRBC # BLD: 0 /100 WBCS — SIGNIFICANT CHANGE UP
NRBC # FLD: 0 K/UL — SIGNIFICANT CHANGE UP
NT-PROBNP SERPL-SCNC: 3876 PG/ML — HIGH
PH UR: 7 — SIGNIFICANT CHANGE UP (ref 5–8)
PLATELET # BLD AUTO: 245 K/UL — SIGNIFICANT CHANGE UP (ref 150–400)
POTASSIUM SERPL-MCNC: 4.3 MMOL/L — SIGNIFICANT CHANGE UP (ref 3.5–5.3)
POTASSIUM SERPL-SCNC: 4.3 MMOL/L — SIGNIFICANT CHANGE UP (ref 3.5–5.3)
PROT SERPL-MCNC: 7.6 G/DL — SIGNIFICANT CHANGE UP (ref 6–8.3)
PROT UR-MCNC: NEGATIVE — SIGNIFICANT CHANGE UP
RBC # BLD: 5.16 M/UL — SIGNIFICANT CHANGE UP (ref 3.8–5.2)
RBC # FLD: 13.2 % — SIGNIFICANT CHANGE UP (ref 10.3–14.5)
RH IG SCN BLD-IMP: POSITIVE — SIGNIFICANT CHANGE UP
SARS-COV-2 RNA SPEC QL NAA+PROBE: SIGNIFICANT CHANGE UP
SODIUM SERPL-SCNC: 133 MMOL/L — LOW (ref 135–145)
SP GR SPEC: 1.01 — SIGNIFICANT CHANGE UP (ref 1–1.05)
TROPONIN T, HIGH SENSITIVITY RESULT: 23 NG/L — SIGNIFICANT CHANGE UP
TROPONIN T, HIGH SENSITIVITY RESULT: 26 NG/L — SIGNIFICANT CHANGE UP
UROBILINOGEN FLD QL: SIGNIFICANT CHANGE UP
WBC # BLD: 10 K/UL — SIGNIFICANT CHANGE UP (ref 3.8–10.5)
WBC # FLD AUTO: 10 K/UL — SIGNIFICANT CHANGE UP (ref 3.8–10.5)

## 2022-03-22 PROCEDURE — 93010 ELECTROCARDIOGRAM REPORT: CPT

## 2022-03-22 PROCEDURE — 99223 1ST HOSP IP/OBS HIGH 75: CPT

## 2022-03-22 PROCEDURE — 99222 1ST HOSP IP/OBS MODERATE 55: CPT

## 2022-03-22 PROCEDURE — 99285 EMERGENCY DEPT VISIT HI MDM: CPT | Mod: 25

## 2022-03-22 PROCEDURE — 71046 X-RAY EXAM CHEST 2 VIEWS: CPT | Mod: 26

## 2022-03-22 RX ORDER — LEVOTHYROXINE SODIUM 125 MCG
112 TABLET ORAL DAILY
Refills: 0 | Status: DISCONTINUED | OUTPATIENT
Start: 2022-03-22 | End: 2022-03-26

## 2022-03-22 RX ORDER — DIAZEPAM 5 MG
5 TABLET ORAL AT BEDTIME
Refills: 0 | Status: DISCONTINUED | OUTPATIENT
Start: 2022-03-22 | End: 2022-03-26

## 2022-03-22 RX ORDER — LOSARTAN POTASSIUM 100 MG/1
1 TABLET, FILM COATED ORAL
Qty: 0 | Refills: 0 | DISCHARGE

## 2022-03-22 RX ORDER — ACETAMINOPHEN 500 MG
650 TABLET ORAL EVERY 6 HOURS
Refills: 0 | Status: DISCONTINUED | OUTPATIENT
Start: 2022-03-22 | End: 2022-03-26

## 2022-03-22 RX ORDER — METOPROLOL TARTRATE 50 MG
25 TABLET ORAL ONCE
Refills: 0 | Status: COMPLETED | OUTPATIENT
Start: 2022-03-22 | End: 2022-03-22

## 2022-03-22 RX ORDER — METOPROLOL TARTRATE 50 MG
25 TABLET ORAL EVERY 6 HOURS
Refills: 0 | Status: DISCONTINUED | OUTPATIENT
Start: 2022-03-22 | End: 2022-03-23

## 2022-03-22 RX ORDER — APIXABAN 2.5 MG/1
2.5 TABLET, FILM COATED ORAL
Refills: 0 | Status: DISCONTINUED | OUTPATIENT
Start: 2022-03-22 | End: 2022-03-25

## 2022-03-22 RX ORDER — ATORVASTATIN CALCIUM 80 MG/1
20 TABLET, FILM COATED ORAL AT BEDTIME
Refills: 0 | Status: DISCONTINUED | OUTPATIENT
Start: 2022-03-22 | End: 2022-03-26

## 2022-03-22 RX ORDER — PANTOPRAZOLE SODIUM 20 MG/1
40 TABLET, DELAYED RELEASE ORAL
Refills: 0 | Status: DISCONTINUED | OUTPATIENT
Start: 2022-03-22 | End: 2022-03-26

## 2022-03-22 RX ORDER — DULOXETINE HYDROCHLORIDE 30 MG/1
1 CAPSULE, DELAYED RELEASE ORAL
Qty: 0 | Refills: 0 | DISCHARGE

## 2022-03-22 RX ORDER — METOPROLOL TARTRATE 50 MG
5 TABLET ORAL ONCE
Refills: 0 | Status: DISCONTINUED | OUTPATIENT
Start: 2022-03-22 | End: 2022-03-22

## 2022-03-22 RX ORDER — OXYCODONE HYDROCHLORIDE 5 MG/1
5 TABLET ORAL EVERY 8 HOURS
Refills: 0 | Status: DISCONTINUED | OUTPATIENT
Start: 2022-03-22 | End: 2022-03-26

## 2022-03-22 RX ORDER — CLOPIDOGREL BISULFATE 75 MG/1
75 TABLET, FILM COATED ORAL DAILY
Refills: 0 | Status: DISCONTINUED | OUTPATIENT
Start: 2022-03-22 | End: 2022-03-26

## 2022-03-22 RX ADMIN — Medication 0.5 MILLIGRAM(S): at 19:24

## 2022-03-22 RX ADMIN — Medication 25 MILLIGRAM(S): at 18:37

## 2022-03-22 RX ADMIN — APIXABAN 2.5 MILLIGRAM(S): 2.5 TABLET, FILM COATED ORAL at 22:48

## 2022-03-22 RX ADMIN — Medication 25 MILLIGRAM(S): at 22:14

## 2022-03-22 NOTE — H&P ADULT - HISTORY OF PRESENT ILLNESS
Pt is a 89 yo woman w/ HTN, HLD, fibromyalgia, spinal stenosis on chronic pain meds, anxiety on prn valium, afib on eliquis, CVA, CAD s/p PCI, hypothyroid who presents from cardiologist office for rapid afib w/ intermittent bradycardia and PPM evaluation. Pt is a limited historian overall but reports months of anxiety, vague chest pain, shortness of breath and palpitations over months to years. They have only progressed reportedly and worse on exertion. No further elaboration on her symptoms but reports somewhat improved in the ER. She takes all her medications as prescribed. No fever, cough, abd pain, dysuria or diarrhea. In the ER pt in afib RVR and given metoprolol.

## 2022-03-22 NOTE — ED ADULT NURSE NOTE - NSIMPLEMENTINTERV_GEN_ALL_ED
Implemented All Fall with Harm Risk Interventions:  [place] to call system. Call bell, personal items and telephone within reach. Instruct patient to call for assistance. Room bathroom lighting operational. Non-slip footwear when patient is off stretcher. Physically safe environment: no spills, clutter or unnecessary equipment. Stretcher in lowest position, wheels locked, appropriate side rails in place. Provide visual cue, wrist band, yellow gown, etc. Monitor gait and stability. Monitor for mental status changes and reorient to person, place, and time. Review medications for side effects contributing to fall risk. Reinforce activity limits and safety measures with patient and family. Provide visual clues: red socks.

## 2022-03-22 NOTE — H&P ADULT - PROBLEM SELECTOR PLAN 2
pt w/ chronic chest pain reportedly, plan as above. Pt reports she is no longer on aspirin, just plavix and eliquis. Trops stable. ekg w/ rapid afib  -resume metoprolol as above, cont plavix, eliquis, statin  -echo as above

## 2022-03-22 NOTE — H&P ADULT - PROBLEM SELECTOR PLAN 1
intermittent rapid afib w/ giselle episodes likely responsible for pt's persistent dyspnea, palpitations and chronic chest discomfort. Concern for possible tachybrady syndrome  -EP consulted, NPO post midnight for PPM. EP aware of 3sec pause - pt asymptomatic, blood pressure stable and HR resumed 130s, EP to view tele strip.  -continue metoprolol 25mg q6h and eliquis for now  -echo ordered

## 2022-03-22 NOTE — PROVIDER CONTACT NOTE (OTHER) - BACKGROUND
patient admitted for atrial fibrillation with plans to have pacemaker placed.
admitted for rapid atrial fibrillation.

## 2022-03-22 NOTE — ED PROVIDER NOTE - OBJECTIVE STATEMENT
89 yp F pmhx of h/o HTN, fibromyalgia, spinal stenosis on chronic pain medications, afib on eliquis, CVA, hx x2 cardiac stents, chronic chest pain and sob who is presenting from [facility] doctors office for evaluation of tachycardia.  Pt states she was having routine visit for SOB and palpitations.  Pt was found to be tachycardic and sent to the ED for further evaluation. Pt is a poor historian with hyper verbal speech who appears very anxious.  Pt reports multiple episodes of reoccurring CP and dyspnea.  Patient is unable further elaboate on her CP or SOB.  Per patient she took all of her medication this AM including her metoprolol but does not know her dose.  Has a HHA that is with her 4 hours a day beginning at 1:30pm. 89 yp F pmhx of h/o HTN, fibromyalgia, spinal stenosis on chronic pain medications, afib on eliquis, CVA, hx x2 cardiac stents, chronic chest pain and sob who is presenting from [facility] doctors office for evaluation of tachycardia.  Pt states she was having routine visit for SOB and palpitations.  Pt was found to be tachycardic and sent to the ED for further evaluation. Pt is a poor historian with hyper verbal speech who appears very anxious with tangential answers.  Pt reports multiple episodes of reoccurring CP and dyspnea.  Patient is unable further elaboate on her CP or SOB.  Per patient she took all of her medication this AM including her metoprolol but does not know her dose.  Has a HHA that is with her 4 hours a day beginning at 1:30pm.

## 2022-03-22 NOTE — ED ADULT TRIAGE NOTE - CHIEF COMPLAINT QUOTE
pt coming from doctors office for evaluation of tachycardia.  pt was having routine visit and c/o SOB and palpitations, was found to be tachycardic (170's)

## 2022-03-22 NOTE — H&P ADULT - NSICDXPASTMEDICALHX_GEN_ALL_CORE_FT
PAST MEDICAL HISTORY:  Benign Essential Hypertension     CVA (cerebral vascular accident)     Depression     Essential hypertension     Fibromyalgia     Hypothyroidism     Spinal stenosis

## 2022-03-22 NOTE — CONSULT NOTE ADULT - NS ATTEND AMEND GEN_ALL_CORE FT
Patient is a 89y old Female with PMH of HTN, HLD, fibromyalgia, spinal stenosis on chronic pain medications, Afib on Eliquis (denies hx of Ablation or DCCV), CVA, CAD s/p WAGNER to LAD in 12/21 at [facility], hypothyroidism, anxiety, sent by her Cardiologist Dr. Galloway for Afib with RVR, and intermittent bradycardia for PPM evaluation. Patient is a poor historian and appears extremely anxious. Patient states she has chest pain along with SOB and palpitation for years. Currently taking Eliquis 2.5mg BID and Metoprolol 25 mg BID routinely, reports has not missed a dose. Of note, patient had PARAMJIT on 12/2/2022 revealed mild-mod MR, Mild MS, Normal LV size and function. Her EKG on 11/27/2021 at [facility] ER showed sinus bradycardia with 1st degree AVB, AK 258ms, QRS 86MS, QT/QTc 486/468ms. EKG today showed Afib/AT with V rate at 166bpm, QT/QTc 290/481ms, QRS 88ms. In light of symptomatic Afib with RVR, and TBS as evidence by bradycardia , patient would likely benefit from PPM. The process of the procedures along with the risks and benefits for each procedure were explained in detail to patient and her daughter which included but not limited to bleeding requiring transfusion, infection, stroke, plural effusion, esophageal injury, pericardial effusion, cardiac tamponade requiring chest tube, intubation, and death. Patient expressed understanding and all questions were answered. Patient and her daughter are both agreeable to the procedure.       Afib/AT with TBS    - Plan for PPM on 3/23. AM labs, Covid swab, NPO after MN   - Increase Metoprolol to 25mg Q6 for rate control and uptitrate if BP allows  - please obtain ECHO to assess LV/RV function  - Continuous telemetric monitoring   - Monitor electrolytes and replete K to 4 and Mg to 2  - Continue Eliquis for thromboembolic ppx given that patient has a [id number] score of 7  - Continue care per primary team

## 2022-03-22 NOTE — CONSULT NOTE ADULT - ASSESSMENT
Patient is a 89y old Female with PMH of HTN, HLD, fibromyalgia, spinal stenosis on chronic pain medications, Afib on Eliquis (denies hx of Ablation or DCCV), CVA, CAD s/p WAGNER to LAD in 12/21 at [facility], hypothyroidism, anxiety, sent by her Cardiologist Dr. Galloway for Afib with RVR, and intermittent bradycardia for PPM evaluation. Patient is a poor historian and appears extremely anxious. Patient states she has chest pain along with SOB and palpitation for years. Currently taking Eliquis 2.5mg BID and Metoprolol 25 mg BID routinely, reports has not missed a dose. Of note, patient had PARAMJIT on 12/2/2022 revealed mild-mod MR, Mild MS, Normal LV size and function. Her EKG on 11/27/2021 at [facility] ER showed sinus bradycardia with 1st degree AVB, IN 258ms, QRS 86MS, QT/QTc 486/468ms. EKG today showed Afib/AT with V rate at 166bpm, QT/QTc 290/481ms, QRS 88ms. In light of symptomatic Afib with RVR, and TBS as evidence by bradycardia , patient would likely benefit from PPM. The process of the procedures along with the risks and benefits for each procedure were explained in detail to patient and her daughter which included but not limited to bleeding requiring transfusion, infection, stroke, plural effusion, esophageal injury, pericardial effusion, cardiac tamponade requiring chest tube, intubation, and death. Patient expressed understanding and all questions were answered. Patient is considering and prefers to discuss with her daugher futher prior to make decision.       Afib/AT with TBS    - Plan for PPM if patient is agreeable. AM labs, Covid swab, NPO after MN   - Increase Metoprolol to 25mg Q6 for rate control and uptitrate if BP allows  - please obtain ECHO to assess LV/RV function  - Continuous telemetric monitoring   - Monitor electrolytes and replete K to 4 and Mg to 2  - Continue Eliquis for thromboembolic ppx given that patient has a [id number] score of 7  - Continue care per primary team    Patient to be staffed with attending. Please await attending addendum Patient is a 89y old Female with PMH of HTN, HLD, fibromyalgia, spinal stenosis on chronic pain medications, Afib on Eliquis (denies hx of Ablation or DCCV), CVA, CAD s/p WAGNER to LAD in 12/21 at [facility], hypothyroidism, anxiety, sent by her Cardiologist Dr. Galloway for Afib with RVR, and intermittent bradycardia for PPM evaluation. Patient is a poor historian and appears extremely anxious. Patient states she has chest pain along with SOB and palpitation for years. Currently taking Eliquis 2.5mg BID and Metoprolol 25 mg BID routinely, reports has not missed a dose. Of note, patient had PARAMJIT on 12/2/2022 revealed mild-mod MR, Mild MS, Normal LV size and function. Her EKG on 11/27/2021 at [facility] ER showed sinus bradycardia with 1st degree AVB, PA 258ms, QRS 86MS, QT/QTc 486/468ms. EKG today showed Afib/AT with V rate at 166bpm, QT/QTc 290/481ms, QRS 88ms. In light of symptomatic Afib with RVR, and TBS as evidence by bradycardia , patient would likely benefit from PPM. The process of the procedures along with the risks and benefits for each procedure were explained in detail to patient and her daughter which included but not limited to bleeding requiring transfusion, infection, stroke, plural effusion, esophageal injury, pericardial effusion, cardiac tamponade requiring chest tube, intubation, and death. Patient expressed understanding and all questions were answered. Patient and her daughter are both agreeable to the procedure.       Afib/AT with TBS    - Plan for PPM on 3/23. AM labs, Covid swab, NPO after MN   - Increase Metoprolol to 25mg Q6 for rate control and uptitrate if BP allows  - please obtain ECHO to assess LV/RV function  - Continuous telemetric monitoring   - Monitor electrolytes and replete K to 4 and Mg to 2  - Continue Eliquis for thromboembolic ppx given that patient has a [id number] score of 7  - Continue care per primary team    Patient to be staffed with attending. Please await attending addendum

## 2022-03-22 NOTE — H&P ADULT - PROBLEM SELECTOR PLAN 5
pt reports she is on chronic tylenol#4. Per medrec in paper chart pt also on percocets, will resume this. Pt with chronic pain and fibromyalgia.

## 2022-03-22 NOTE — H&P ADULT - ASSESSMENT
89 yo woman w/ HTN, HLD, fibromyalgia, spinal stenosis on chronic pain meds, anxiety on prn valium, afib on eliquis, CVA, CAD s/p PCI, hypothyroid who presents from cardiologist office for rapid afib w/ intermittent bradycardia and PPM evaluation.

## 2022-03-22 NOTE — ED PROVIDER NOTE - ATTENDING CONTRIBUTION TO CARE
GEN - NAD; well appearing; A+O x3   HEAD - NC/AT   EYES- PERRL, EOMI  ENT: Airway patent, mmm, Oral cavity and pharynx normal. No inflammation, swelling, exudate, or lesions.  NECK: Neck supple, non-tender without lymphadenopathy, no masses.  PULMONARY - CTA b/l, symmetric breath sounds.   CARDIAC -s1s2, irregular RR, ranges from 90s to 130s  ABDOMEN - +BS, ND, NT, soft, no guarding, no rebound, no masses   BACK - no CVA tenderness, Normal  spine   EXTREMITIES - FROM, symmetric pulses, capillary refill < 2 seconds, no edema   SKIN - no rash or bruising   NEUROLOGIC - alert, speech clear, no focal deficits  PSYCH -anxious, tangential, ao3  [id number] presents to the ed from cardiologist office for evaluation of chronic intermittent chest heaviness and sob as well as palpitations. EP at bedside called by cardiologist-states outpt cardiologist concerned for tachy/giselle syndrome. Per patient she has been adherent to her medication regimen (includes eliquis and metoprolol). No fevers, chills, ha, cough, abd pain, vomiting, dysuria, edema.  On exam patient moderately anxious, provides some history but endorses feeling flustered and unable to answer all questions due to constant distraction, requires repeated questions and repeated redirection. Is ao3 and does demonstrate understanding of her current condition. States her chest discomfort is improved from prior. Unlabored breathing, abd soft/nt, no edema. Rate remains low 90's to 110 when not speaking. When asked questions elevates to 120s. Check labs, cxr, ekg, eval for anemia, elec disturbances, organ dysfunction.  Will administer po metoprolol per ep recs, bp wnl. Reass pending their final recs and w/u.

## 2022-03-22 NOTE — ED PROVIDER NOTE - PHYSICAL EXAMINATION
Constitutional: NAD, well developed, well nourished  Neuro: A+O x 3, hyper verbal speech  HEENT: NC/AT, PERRL, EOMI, anicteric sclerae, oral mucosa pink and moist  Neck: supple, no JVD  CV: Afib, tachycardia, +S1S2, no murmurs or rub  Pulm/chest: lung sounds CTA and equal bilaterally, no accessory muscle use noted  Abd: soft, NT, ND, +BS  Ext: REES x 4, no C/C/E  Skin: warm, well perfused  Psych: anxious

## 2022-03-22 NOTE — ED PROVIDER NOTE - NS ED ROS FT
CONSTITUTIONAL: Denies fever, weight loss, or fatigue  ENT: Denies dysphagia, odynophagia, blurred vision, tinnitus, neck stiffness  RESPIRATORY: + SOB Denies cough, wheeze, hemoptysis  CARDIOVASCULAR: Rpts palpitations, Denies chest pain  GASTROINTESTINAL:  Denies nausea, vomiting, abdominal pain, diarrhea, constipation, melena, BRBPR, food intolerances  GENITOURINARY: denies Dysuria, hematuria, urinary frequency, urinary incontinence  NEUROLOGICAL: Reports dizziness, Denies headaches, syncope, near syncope, headaches, seizures  SKIN: Denies rashes, masses, bruising, lesions   ENDOCRINE: Denies heat or cold intolerance; No hair loss  MUSCULOSKELETAL: Denies history of OA or gout, joint swelling

## 2022-03-22 NOTE — PROVIDER CONTACT NOTE (OTHER) - ASSESSMENT
patient is asymptomatic. denies chest pain and SOB.
patient is asymptomatic. denies chest pain and SOB. patient is anxious .

## 2022-03-22 NOTE — CONSULT NOTE ADULT - SUBJECTIVE AND OBJECTIVE BOX
Source: patient and Chart    HPI:  Patient is a 89y old Female with PMH of HTN, HLD, fibromyalgia, spinal stenosis on chronic pain medications, Afib on Eliquis (denies hx of Ablation or DCCV), CVA, CAD s/p WAGNER to LAD in 12/21 at [facility], hypothyroidism, anxiety, sent by her Cardiologist Dr. Galloway for Afib with RVR, and intermittent bradycardia for PPM evaluation. Patient is a poor historian and appears extremely anxious. Patient states she has chest pain with pressure like feeling along with SOB which is worse with exertion for years. Also admits feeling palpitation. The symptoms has become worse which she went to her cardiologist and subsequently sent to [facility] for further evaluation. Currently taking Eliquis 2.5mg BID and Metoprolol 25 mg BID routinely, reports has not missed a dose. Of note, patient had PARAMJIT on 12/2/2022 revealed mild-mod MR, Mild MS, Normal LV size and function. Her EKG on 11/27/2021 at [facility] ER showed sinus bradycardia with 1st degree AVB, WI 258ms, QRS 86MS, QT/QTc 486/468ms. EKG today showed Afib/AT with V rate at 166bpm, QT/QTc 290/481ms, QRS 88ms. In light of symptomatic Afib with RVR, and TBS as evidence by bradycardia, patient would likely benefit from PPM. The process of the procedures along with the risks and benefits for each procedure were explained in detail to patient and her daughter which included but not limited to bleeding requiring transfusion, infection, stroke, plural effusion, esophageal injury, pericardial effusion, cardiac tamponade requiring chest tube, intubation, and death. Patient expressed understanding and all questions were answered. Patient is considering and prefers to discuss with her daugher futher prior to make decision.       Patient denied fever, chills, diaphoresis, HA, lightheadedness, dizziness, changes in visions, cold like symptoms  (sore throat cough, conjunctivitis runny nose), CP, palpitation, SOB, abdominal pain, n/v/d, hematuria, melena, hematochezia numbness and tingling    PAST MEDICAL & SURGICAL HISTORY:  Benign Essential Hypertension    Hypothyroidism    Depression    Fibromyalgia    CVA (cerebral vascular accident)    Spinal stenosis    Essential hypertension    H/O: Hysterectomy  40 years ago    History of Tonsillectomy  childhood    S/P Laminectomy  Lumbar Laminectomy 2001    Cataract  2008, 2009          MEDICATIONS  (STANDING):    MEDICATIONS  (PRN):      FAMILY HISTORY:  No pertinent family history in first degree relatives        SOCIAL HISTORY:    LIVING SITUATION:  CIGARETTES: Denied  ALCOHOL: denied   ILLICIT DRUG USES: denied    REVIEW OF SYSTEMS:  CONSTITUTIONAL: No fever, weight loss, chills, shakes, or fatigue  EYES: No eye pain, visual disturbances, or discharge  ENMT:  No difficulty hearing, tinnitus, vertigo; No sinus or throat pain  NECK: No pain or stiffness  RESPIRATORY: No cough, wheezing, hemoptysis, or shortness of breath  CARDIOVASCULAR: +chest pain, +dyspnea,+ palpitations, dizziness, syncope, paroxysmal nocturnal dyspnea, orthopnea, or arm or leg swelling  GASTROINTESTINAL: No abdominal  or epigastric pain, nausea, vomiting, hematemesis, diarrhea, constipation, melena or bright red blood.  GENITOURINARY: No dysuria, nocturia, hematuria, or urinary incontinence  NEUROLOGICAL: No headaches, memory loss, slurred speech, limb weakness, loss of strength, numbness, or tremors  MUSCULOSKELETAL: No joint pain or swelling, muscle, back, or extremity pain  PSYCHIATRIC: No depression, anxiety, or difficulty sleeping        Vital Signs Last 24 Hrs  T(C): 36.6 (22 Mar 2022 16:13), Max: 36.6 (22 Mar 2022 16:13)  T(F): 97.8 (22 Mar 2022 16:13), Max: 97.8 (22 Mar 2022 16:13)  HR: 96 (22 Mar 2022 17:52) (96 - 121)  BP: 122/75 (22 Mar 2022 16:13) (122/75 - 122/75)  BP(mean): --  RR: 18 (22 Mar 2022 16:13) (18 - 18)  SpO2: 97% (22 Mar 2022 16:13) (97% - 97%)    PHYSICAL EXAM:  GENERAL: Well appearing, speaking in full sentence, in NAD  HEART: Irregular, rapid, No murmurs, rubs, or gallops appreciated .  PULMONARY:CTABL, normal respiratory effort.  No rales, wheezing, or rhonchi appreciated bilaterally  ABDOMEN: Bowel sounds present, soft, NDNT  EXTREMITIES:  Warm, well -perfused, no pedal edema, distal pulses present  NEUROLOGICAL:AOx3 ,+ anxious    INTERPRETATION OF TELEMETRY: not on tele    ECG: Afib/AT with V rate at 166bpm, QT/QTc 290/481ms, QRS 88ms.     I&O's Detail      LABS:                        15.7   10.00 )-----------( 245      ( 22 Mar 2022 17:16 )             45.2     03-22    133<L>  |  95<L>  |  19  ----------------------------<  132<H>  4.3   |  23  |  0.82    Ca    10.1      22 Mar 2022 17:16  Mg     1.90     03-22    TPro  7.6  /  Alb  4.9  /  TBili  1.0  /  DBili  x   /  AST  34<H>  /  ALT  19  /  AlkPhos  81  03-22            BNPSerum Pro-Brain Natriuretic Peptide: 3876 pg/mL (03-22 @ 17:16)    I&O's Detail    Daily Height in cm: 162.56 (22 Mar 2022 16:13)    Daily     RADIOLOGY & ADDITIONAL STUDIES:         Source: patient and Chart    HPI:  Patient is a 89y old Female with PMH of HTN, HLD, fibromyalgia, spinal stenosis on chronic pain medications, Afib on Eliquis (denies hx of Ablation or DCCV), CVA, CAD s/p WAGNER to LAD in 12/21 at [facility], hypothyroidism, anxiety, sent by her Cardiologist Dr. Galloway for Afib with RVR, and intermittent bradycardia for PPM evaluation. Patient is a poor historian and appears extremely anxious. Patient states she has chest pain with pressure like feeling along with SOB which is worse with exertion for years. Also admits feeling palpitation. The symptoms has become worse which she went to her cardiologist and subsequently sent to [facility] for further evaluation. Currently taking Eliquis 2.5mg BID and Metoprolol 25 mg BID routinely, reports has not missed a dose. Of note, patient had PARAMJIT on 12/2/2022 revealed mild-mod MR, Mild MS, Normal LV size and function. Her EKG on 11/27/2021 at [facility] ER showed sinus bradycardia with 1st degree AVB, ND 258ms, QRS 86MS, QT/QTc 486/468ms. EKG today showed Afib/AT with V rate at 166bpm, QT/QTc 290/481ms, QRS 88ms. In light of symptomatic Afib with RVR, and TBS as evidence by bradycardia, patient would likely benefit from PPM. The process of the procedures along with the risks and benefits for each procedure were explained in detail to patient and her daughter which included but not limited to bleeding requiring transfusion, infection, stroke, plural effusion, esophageal injury, pericardial effusion, cardiac tamponade requiring chest tube, intubation, and death. Patient expressed understanding and all questions were answered. Patient and her daughter are both agreeable to the procedure.       Patient denied fever, chills, diaphoresis, HA, lightheadedness, dizziness, changes in visions, cold like symptoms  (sore throat cough, conjunctivitis runny nose), CP, palpitation, SOB, abdominal pain, n/v/d, hematuria, melena, hematochezia numbness and tingling    PAST MEDICAL & SURGICAL HISTORY:  Benign Essential Hypertension    Hypothyroidism    Depression    Fibromyalgia    CVA (cerebral vascular accident)    Spinal stenosis    Essential hypertension    H/O: Hysterectomy  40 years ago    History of Tonsillectomy  childhood    S/P Laminectomy  Lumbar Laminectomy 2001    Cataract  2008, 2009          MEDICATIONS  (STANDING):    MEDICATIONS  (PRN):      FAMILY HISTORY:  No pertinent family history in first degree relatives        SOCIAL HISTORY:    LIVING SITUATION:  CIGARETTES: Denied  ALCOHOL: denied   ILLICIT DRUG USES: denied    REVIEW OF SYSTEMS:  CONSTITUTIONAL: No fever, weight loss, chills, shakes, or fatigue  EYES: No eye pain, visual disturbances, or discharge  ENMT:  No difficulty hearing, tinnitus, vertigo; No sinus or throat pain  NECK: No pain or stiffness  RESPIRATORY: No cough, wheezing, hemoptysis, or shortness of breath  CARDIOVASCULAR: +chest pain, +dyspnea,+ palpitations, dizziness, syncope, paroxysmal nocturnal dyspnea, orthopnea, or arm or leg swelling  GASTROINTESTINAL: No abdominal  or epigastric pain, nausea, vomiting, hematemesis, diarrhea, constipation, melena or bright red blood.  GENITOURINARY: No dysuria, nocturia, hematuria, or urinary incontinence  NEUROLOGICAL: No headaches, memory loss, slurred speech, limb weakness, loss of strength, numbness, or tremors  MUSCULOSKELETAL: No joint pain or swelling, muscle, back, or extremity pain  PSYCHIATRIC: No depression, anxiety, or difficulty sleeping        Vital Signs Last 24 Hrs  T(C): 36.6 (22 Mar 2022 16:13), Max: 36.6 (22 Mar 2022 16:13)  T(F): 97.8 (22 Mar 2022 16:13), Max: 97.8 (22 Mar 2022 16:13)  HR: 96 (22 Mar 2022 17:52) (96 - 121)  BP: 122/75 (22 Mar 2022 16:13) (122/75 - 122/75)  BP(mean): --  RR: 18 (22 Mar 2022 16:13) (18 - 18)  SpO2: 97% (22 Mar 2022 16:13) (97% - 97%)    PHYSICAL EXAM:  GENERAL: Well appearing, speaking in full sentence, in NAD  HEART: Irregular, rapid, No murmurs, rubs, or gallops appreciated .  PULMONARY:CTABL, normal respiratory effort.  No rales, wheezing, or rhonchi appreciated bilaterally  ABDOMEN: Bowel sounds present, soft, NDNT  EXTREMITIES:  Warm, well -perfused, no pedal edema, distal pulses present  NEUROLOGICAL:AOx3 ,+ anxious    INTERPRETATION OF TELEMETRY: not on tele    ECG: Afib/AT with V rate at 166bpm, QT/QTc 290/481ms, QRS 88ms.     I&O's Detail      LABS:                        15.7   10.00 )-----------( 245      ( 22 Mar 2022 17:16 )             45.2     03-22    133<L>  |  95<L>  |  19  ----------------------------<  132<H>  4.3   |  23  |  0.82    Ca    10.1      22 Mar 2022 17:16  Mg     1.90     03-22    TPro  7.6  /  Alb  4.9  /  TBili  1.0  /  DBili  x   /  AST  34<H>  /  ALT  19  /  AlkPhos  81  03-22            BNPSerum Pro-Brain Natriuretic Peptide: 3876 pg/mL (03-22 @ 17:16)    I&O's Detail    Daily Height in cm: 162.56 (22 Mar 2022 16:13)    Daily     RADIOLOGY & ADDITIONAL STUDIES:

## 2022-03-22 NOTE — H&P ADULT - NSHPREVIEWOFSYSTEMS_GEN_ALL_CORE
REVIEW OF SYSTEMS:    CONSTITUTIONAL: No weakness, fevers or chills  EYES/ENT: No visual changes;  No dysphagia  NECK: No pain or stiffness  RESPIRATORY: +dyspnea, no cough, wheezing, or hemoptysis  CARDIOVASCULAR: +chest pain and palpitations; No lower extremity edema  GASTROINTESTINAL: No abdominal or epigastric pain. No nausea, vomiting, or hematemesis; No diarrhea or constipation. No melena or hematochezia.  GENITOURINARY: No dysuria, frequency or hematuria  NEUROLOGICAL: No numbness or weakness  PSYCH: +anxiety  SKIN: No itching, burning, rashes, or lesions

## 2022-03-22 NOTE — ED ADULT NURSE NOTE - OBJECTIVE STATEMENT
89 year old female received to rm 14 AAOx3 ambulatory. Pt sent by PMD for tachycardia and shortness of breath. Pt at PMD for routine visit. Pt endorses shortness of breath, chest pain, palpitations. Pt with difficulty to answer all assessment questions stating "I don't know my brain is all over". Pt appears very anxious, hyperverbal. Pt is sinus tachycardia 109-140s. Pt is easily excitable. Respirations even and unlabored. Pt presents with 18G PIV left AC, labs drawn and sent. VS as noted. EKG done. Bed in lowest position, call bell within reach. Pt awaiting CXR

## 2022-03-22 NOTE — H&P ADULT - NSHPLABSRESULTS_GEN_ALL_CORE
133<L>  |  95<L>  |  19  ----------------------------<  132<H>  4.3   |  23  |  0.82    Ca    10.1      22 Mar 2022 17:16  Mg     1.90         TPro  7.6  /  Alb  4.9  /  TBili  1.0  /  DBili  x   /  AST  34<H>  /  ALT  19  /  AlkPhos  81                              15.7   10.00 )-----------( 245      ( 22 Mar 2022 17:16 )             45.2             LIVER FUNCTIONS - ( 22 Mar 2022 17:16 )  Alb: 4.9 g/dL / Pro: 7.6 g/dL / ALK PHOS: 81 U/L / ALT: 19 U/L / AST: 34 U/L / GGT: x                 Urinalysis Basic - ( 22 Mar 2022 19:10 )    Color: Colorless / Appearance: Clear / S.008 / pH: x  Gluc: x / Ketone: Negative  / Bili: Negative / Urobili: <2 mg/dL   Blood: x / Protein: Negative / Nitrite: Negative   Leuk Esterase: Negative / RBC: x / WBC x   Sq Epi: x / Non Sq Epi: x / Bacteria: x    EKG: rapid afib    CXR: clear lungs

## 2022-03-22 NOTE — H&P ADULT - NSICDXPASTSURGICALHX_GEN_ALL_CORE_FT
PAST SURGICAL HISTORY:  Cataract 2008, 2009    H/O: Hysterectomy 40 years ago    History of Tonsillectomy childhood    S/P Laminectomy Lumbar Laminectomy 2001

## 2022-03-22 NOTE — ED ADULT NURSE REASSESSMENT NOTE - NS ED NURSE REASSESS COMMENT FT1
Handoff receive pt in bed Alert, sinus tach on cardiac monitor NAD, will monitor pt. Handoff receive pt in bed Alert rosalinda, , Afib on on cardiac monitor NAD, will monitor pt.

## 2022-03-22 NOTE — H&P ADULT - NSHPPHYSICALEXAM_GEN_ALL_CORE
PHYSICAL EXAM:  GENERAL: NAD, well-developed, well-nourished  HEAD:  Atraumatic, Normocephalic  EYES: EOMI, PERRLA, conjunctiva and sclera clear  NECK: Supple, No JVD  CHEST/LUNG: Clear to auscultation bilaterally; No wheezes, rales or rhonchi  HEART: tachycardic, irregular; No murmurs, rubs, or gallops, (+)S1, S2  ABDOMEN: Soft, Nontender, Nondistended; Normal Bowel sounds   EXTREMITIES:  2+ Peripheral Pulses, No clubbing, cyanosis, or edema  PSYCH: anxious appearing  NEUROLOGY: AAOx3, non-focal  SKIN: No rashes or lesions

## 2022-03-23 LAB
ALBUMIN SERPL ELPH-MCNC: 4.2 G/DL — SIGNIFICANT CHANGE UP (ref 3.3–5)
ALP SERPL-CCNC: 69 U/L — SIGNIFICANT CHANGE UP (ref 40–120)
ALT FLD-CCNC: 12 U/L — SIGNIFICANT CHANGE UP (ref 4–33)
ANION GAP SERPL CALC-SCNC: 12 MMOL/L — SIGNIFICANT CHANGE UP (ref 7–14)
AST SERPL-CCNC: 19 U/L — SIGNIFICANT CHANGE UP (ref 4–32)
BASOPHILS # BLD AUTO: 0.03 K/UL — SIGNIFICANT CHANGE UP (ref 0–0.2)
BASOPHILS NFR BLD AUTO: 0.5 % — SIGNIFICANT CHANGE UP (ref 0–2)
BILIRUB DIRECT SERPL-MCNC: 0.2 MG/DL — SIGNIFICANT CHANGE UP (ref 0–0.3)
BILIRUB INDIRECT FLD-MCNC: 0.7 MG/DL — SIGNIFICANT CHANGE UP (ref 0–1)
BILIRUB SERPL-MCNC: 0.9 MG/DL — SIGNIFICANT CHANGE UP (ref 0.2–1.2)
BUN SERPL-MCNC: 17 MG/DL — SIGNIFICANT CHANGE UP (ref 7–23)
CALCIUM SERPL-MCNC: 9.8 MG/DL — SIGNIFICANT CHANGE UP (ref 8.4–10.5)
CHLORIDE SERPL-SCNC: 99 MMOL/L — SIGNIFICANT CHANGE UP (ref 98–107)
CO2 SERPL-SCNC: 24 MMOL/L — SIGNIFICANT CHANGE UP (ref 22–31)
CREAT SERPL-MCNC: 0.79 MG/DL — SIGNIFICANT CHANGE UP (ref 0.5–1.3)
CULTURE RESULTS: SIGNIFICANT CHANGE UP
EGFR: 69 ML/MIN/1.73M2 — SIGNIFICANT CHANGE UP
EOSINOPHIL # BLD AUTO: 0.14 K/UL — SIGNIFICANT CHANGE UP (ref 0–0.5)
EOSINOPHIL NFR BLD AUTO: 2.3 % — SIGNIFICANT CHANGE UP (ref 0–6)
GLUCOSE SERPL-MCNC: 106 MG/DL — HIGH (ref 70–99)
HCT VFR BLD CALC: 42.1 % — SIGNIFICANT CHANGE UP (ref 34.5–45)
HGB BLD-MCNC: 14.8 G/DL — SIGNIFICANT CHANGE UP (ref 11.5–15.5)
IANC: 3.53 K/UL — SIGNIFICANT CHANGE UP (ref 1.5–8.5)
IMM GRANULOCYTES NFR BLD AUTO: 0.5 % — SIGNIFICANT CHANGE UP (ref 0–1.5)
INR BLD: 1.43 RATIO — HIGH (ref 0.88–1.16)
LYMPHOCYTES # BLD AUTO: 1.72 K/UL — SIGNIFICANT CHANGE UP (ref 1–3.3)
LYMPHOCYTES # BLD AUTO: 28.4 % — SIGNIFICANT CHANGE UP (ref 13–44)
MAGNESIUM SERPL-MCNC: 2 MG/DL — SIGNIFICANT CHANGE UP (ref 1.6–2.6)
MCHC RBC-ENTMCNC: 30.6 PG — SIGNIFICANT CHANGE UP (ref 27–34)
MCHC RBC-ENTMCNC: 35.2 GM/DL — SIGNIFICANT CHANGE UP (ref 32–36)
MCV RBC AUTO: 87.2 FL — SIGNIFICANT CHANGE UP (ref 80–100)
MONOCYTES # BLD AUTO: 0.61 K/UL — SIGNIFICANT CHANGE UP (ref 0–0.9)
MONOCYTES NFR BLD AUTO: 10.1 % — SIGNIFICANT CHANGE UP (ref 2–14)
NEUTROPHILS # BLD AUTO: 3.53 K/UL — SIGNIFICANT CHANGE UP (ref 1.8–7.4)
NEUTROPHILS NFR BLD AUTO: 58.2 % — SIGNIFICANT CHANGE UP (ref 43–77)
NRBC # BLD: 0 /100 WBCS — SIGNIFICANT CHANGE UP
NRBC # FLD: 0 K/UL — SIGNIFICANT CHANGE UP
PLATELET # BLD AUTO: 204 K/UL — SIGNIFICANT CHANGE UP (ref 150–400)
POTASSIUM SERPL-MCNC: 3.4 MMOL/L — LOW (ref 3.5–5.3)
POTASSIUM SERPL-SCNC: 3.4 MMOL/L — LOW (ref 3.5–5.3)
PROT SERPL-MCNC: 6.5 G/DL — SIGNIFICANT CHANGE UP (ref 6–8.3)
PROTHROM AB SERPL-ACNC: 16.7 SEC — HIGH (ref 10.5–13.4)
RBC # BLD: 4.83 M/UL — SIGNIFICANT CHANGE UP (ref 3.8–5.2)
RBC # FLD: 13.2 % — SIGNIFICANT CHANGE UP (ref 10.3–14.5)
SODIUM SERPL-SCNC: 135 MMOL/L — SIGNIFICANT CHANGE UP (ref 135–145)
SPECIMEN SOURCE: SIGNIFICANT CHANGE UP
WBC # BLD: 6.06 K/UL — SIGNIFICANT CHANGE UP (ref 3.8–10.5)
WBC # FLD AUTO: 6.06 K/UL — SIGNIFICANT CHANGE UP (ref 3.8–10.5)

## 2022-03-23 PROCEDURE — 93306 TTE W/DOPPLER COMPLETE: CPT | Mod: 26

## 2022-03-23 RX ORDER — POTASSIUM CHLORIDE 20 MEQ
40 PACKET (EA) ORAL ONCE
Refills: 0 | Status: COMPLETED | OUTPATIENT
Start: 2022-03-23 | End: 2022-03-23

## 2022-03-23 RX ORDER — POTASSIUM CHLORIDE 20 MEQ
40 PACKET (EA) ORAL ONCE
Refills: 0 | Status: DISCONTINUED | OUTPATIENT
Start: 2022-03-23 | End: 2022-03-23

## 2022-03-23 RX ORDER — POTASSIUM CHLORIDE 20 MEQ
10 PACKET (EA) ORAL
Refills: 0 | Status: DISCONTINUED | OUTPATIENT
Start: 2022-03-23 | End: 2022-03-23

## 2022-03-23 RX ORDER — METOPROLOL TARTRATE 50 MG
50 TABLET ORAL EVERY 6 HOURS
Refills: 0 | Status: DISCONTINUED | OUTPATIENT
Start: 2022-03-23 | End: 2022-03-26

## 2022-03-23 RX ADMIN — Medication 25 MILLIGRAM(S): at 06:32

## 2022-03-23 RX ADMIN — ATORVASTATIN CALCIUM 20 MILLIGRAM(S): 80 TABLET, FILM COATED ORAL at 21:27

## 2022-03-23 RX ADMIN — APIXABAN 2.5 MILLIGRAM(S): 2.5 TABLET, FILM COATED ORAL at 21:27

## 2022-03-23 RX ADMIN — CLOPIDOGREL BISULFATE 75 MILLIGRAM(S): 75 TABLET, FILM COATED ORAL at 12:00

## 2022-03-23 RX ADMIN — OXYCODONE HYDROCHLORIDE 5 MILLIGRAM(S): 5 TABLET ORAL at 13:10

## 2022-03-23 RX ADMIN — PANTOPRAZOLE SODIUM 40 MILLIGRAM(S): 20 TABLET, DELAYED RELEASE ORAL at 06:33

## 2022-03-23 RX ADMIN — OXYCODONE HYDROCHLORIDE 5 MILLIGRAM(S): 5 TABLET ORAL at 21:27

## 2022-03-23 RX ADMIN — Medication 50 MILLIGRAM(S): at 19:02

## 2022-03-23 RX ADMIN — Medication 112 MICROGRAM(S): at 06:32

## 2022-03-23 RX ADMIN — OXYCODONE HYDROCHLORIDE 5 MILLIGRAM(S): 5 TABLET ORAL at 12:06

## 2022-03-23 RX ADMIN — APIXABAN 2.5 MILLIGRAM(S): 2.5 TABLET, FILM COATED ORAL at 10:32

## 2022-03-23 RX ADMIN — Medication 100 MILLIEQUIVALENT(S): at 10:31

## 2022-03-23 RX ADMIN — Medication 50 MILLIGRAM(S): at 12:00

## 2022-03-23 RX ADMIN — Medication 40 MILLIEQUIVALENT(S): at 12:00

## 2022-03-23 NOTE — PATIENT PROFILE ADULT - FALL HARM RISK - RISK INTERVENTIONS
Assistance OOB with selected safe patient handling equipment/Assistance with ambulation/Communicate Fall Risk and Risk Factors to all staff, patient, and family/Monitor for mental status changes/Monitor gait and stability/Move patient closer to nurses' station/Room/bathroom lighting operational, light cord in reach

## 2022-03-23 NOTE — PATIENT PROFILE ADULT - BILL PAYMENT
no Plan: Recheck in 2 weeks. Detail Level: Simple Plan: Start efudex bid x2 wks to deonte forearms, pt has medication at home.  Handout reviewed and provided

## 2022-03-23 NOTE — PROGRESS NOTE ADULT - ASSESSMENT
89 yo woman w/ HTN, HLD, fibromyalgia, spinal stenosis on chronic pain meds, anxiety on prn valium, afib on eliquis, CVA, CAD s/p PCI, hypothyroid who presents from cardiologist office for rapid afib w/ intermittent bradycardia and PPM evaluation.    Problem/Plan - 1:  ·  Problem: Atrial fibrillation with rapid ventricular response.   ·  Plan: intermittent rapid afib w/ giselle episodes likely responsible for pt's persistent dyspnea, palpitations and chronic chest discomfort. Concern for possible tachybrady syndrome  -EP appreciated  -continue metoprolol 50 mg q6h and eliquis for now  -echo ordered.    Problem/Plan - 2:  ·  Problem: Coronary artery disease.   ·  Plan: pt w/ chronic chest pain reportedly, plan as above. Pt reports she is no longer on aspirin, just plavix and eliquis. Trops stable. ekg w/ rapid afib  -resume metoprolol as above, cont plavix, eliquis, statin  -echo as above.    Problem/Plan - 3:  ·  Problem: Anxiety.   ·  Plan: pt takes valium qhs prn, will restart.    Problem/Plan - 4:  ·  Problem: Hypothyroid.   ·  Plan: tsh wnl, resume synthrod.    Problem/Plan - 5:  ·  Problem: Spinal stenosis.   ·  Plan: pt reports she is on chronic tylenol#4. Per medrec in paper chart pt also on percocets, will resume this. Pt with chronic pain and fibromyalgia.    Problem/Plan - 6:  ·  Problem: Need for prophylactic measure.   ·  Plan: eliquis for dvt ppx, NPO for procedure.

## 2022-03-23 NOTE — PROGRESS NOTE ADULT - ASSESSMENT
Patient is a 89y old Female with PMH of HTN, HLD, fibromyalgia, spinal stenosis on chronic pain medications, Afib on Eliquis (denies hx of Ablation or DCCV), CVA, CAD s/p WAGNER to LAD in 12/21 at [facility], hypothyroidism, anxiety, sent by her Cardiologist Dr. Galloway for Afib with RVR, and intermittent bradycardia for PPM evaluation. Patient is a poor historian and appears extremely anxious. Patient states she has chest pain along with SOB and palpitation for years. Currently taking Eliquis 2.5mg BID and Metoprolol 25 mg BID routinely, reports has not missed a dose. Of note, patient had PARAMJIT on 12/2/2022 revealed mild-mod MR, Mild MS, Normal LV size and function. Her EKG on 11/27/2021 at [facility] ER showed sinus bradycardia with 1st degree AVB, AR 258ms, QRS 86MS, QT/QTc 486/468ms. EKG today showed Afib/AT with V rate at 166bpm, QT/QTc 290/481ms, QRS 88ms. In light of symptomatic Afib with RVR, and TBS as evidence by bradycardia , patient would likely benefit from PPM. The process of the procedures along with the risks and benefits for each procedure were explained in detail to patient and her daughter which included but not limited to bleeding requiring transfusion, infection, stroke, plural effusion, esophageal injury, pericardial effusion, cardiac tamponade requiring chest tube, intubation, and death. Patient expressed understanding and all questions were answered. Patient and her daughter are both agreeable to the procedure.       Afib/AT with TBS    - Plan for PPM on 3/23. AM labs, Keep NPO  - Increase Metoprolol to 50mg Q6 for rate control and up-titrate if BP allows  - please obtain ECHO to assess LV/RV function  - Continuous telemetric monitoring   - Monitor electrolytes and replete K to 4 and Mg to 2  - Continue Eliquis for thromboembolic ppx given that patient has a [id number] score of 7  - Continue care per primary team

## 2022-03-24 LAB
ANION GAP SERPL CALC-SCNC: 13 MMOL/L — SIGNIFICANT CHANGE UP (ref 7–14)
APTT BLD: 37.6 SEC — HIGH (ref 27–36.3)
BASOPHILS # BLD AUTO: 0.02 K/UL — SIGNIFICANT CHANGE UP (ref 0–0.2)
BASOPHILS NFR BLD AUTO: 0.3 % — SIGNIFICANT CHANGE UP (ref 0–2)
BUN SERPL-MCNC: 24 MG/DL — HIGH (ref 7–23)
CALCIUM SERPL-MCNC: 9.6 MG/DL — SIGNIFICANT CHANGE UP (ref 8.4–10.5)
CHLORIDE SERPL-SCNC: 101 MMOL/L — SIGNIFICANT CHANGE UP (ref 98–107)
CO2 SERPL-SCNC: 23 MMOL/L — SIGNIFICANT CHANGE UP (ref 22–31)
CREAT SERPL-MCNC: 1.01 MG/DL — SIGNIFICANT CHANGE UP (ref 0.5–1.3)
EGFR: 52 ML/MIN/1.73M2 — LOW
EOSINOPHIL # BLD AUTO: 0.12 K/UL — SIGNIFICANT CHANGE UP (ref 0–0.5)
EOSINOPHIL NFR BLD AUTO: 1.6 % — SIGNIFICANT CHANGE UP (ref 0–6)
GLUCOSE SERPL-MCNC: 110 MG/DL — HIGH (ref 70–99)
HCT VFR BLD CALC: 47.6 % — HIGH (ref 34.5–45)
HGB BLD-MCNC: 17.1 G/DL — HIGH (ref 11.5–15.5)
IANC: 4.68 K/UL — SIGNIFICANT CHANGE UP (ref 1.8–7.4)
IMM GRANULOCYTES NFR BLD AUTO: 0.8 % — SIGNIFICANT CHANGE UP (ref 0–1.5)
INR BLD: 1.32 RATIO — HIGH (ref 0.88–1.16)
LYMPHOCYTES # BLD AUTO: 1.87 K/UL — SIGNIFICANT CHANGE UP (ref 1–3.3)
LYMPHOCYTES # BLD AUTO: 25.2 % — SIGNIFICANT CHANGE UP (ref 13–44)
MAGNESIUM SERPL-MCNC: 2 MG/DL — SIGNIFICANT CHANGE UP (ref 1.6–2.6)
MCHC RBC-ENTMCNC: 31.6 PG — SIGNIFICANT CHANGE UP (ref 27–34)
MCHC RBC-ENTMCNC: 35.9 GM/DL — SIGNIFICANT CHANGE UP (ref 32–36)
MCV RBC AUTO: 88 FL — SIGNIFICANT CHANGE UP (ref 80–100)
MONOCYTES # BLD AUTO: 0.67 K/UL — SIGNIFICANT CHANGE UP (ref 0–0.9)
MONOCYTES NFR BLD AUTO: 9 % — SIGNIFICANT CHANGE UP (ref 2–14)
NEUTROPHILS # BLD AUTO: 4.68 K/UL — SIGNIFICANT CHANGE UP (ref 1.8–7.4)
NEUTROPHILS NFR BLD AUTO: 63.1 % — SIGNIFICANT CHANGE UP (ref 43–77)
NRBC # BLD: 0 /100 WBCS — SIGNIFICANT CHANGE UP
NRBC # FLD: 0 K/UL — SIGNIFICANT CHANGE UP
PHOSPHATE SERPL-MCNC: 3.3 MG/DL — SIGNIFICANT CHANGE UP (ref 2.5–4.5)
PLATELET # BLD AUTO: 255 K/UL — SIGNIFICANT CHANGE UP (ref 150–400)
POTASSIUM SERPL-MCNC: 3.8 MMOL/L — SIGNIFICANT CHANGE UP (ref 3.5–5.3)
POTASSIUM SERPL-SCNC: 3.8 MMOL/L — SIGNIFICANT CHANGE UP (ref 3.5–5.3)
PROTHROM AB SERPL-ACNC: 15.4 SEC — HIGH (ref 10.5–13.4)
RBC # BLD: 5.41 M/UL — HIGH (ref 3.8–5.2)
RBC # FLD: 13.2 % — SIGNIFICANT CHANGE UP (ref 10.3–14.5)
SODIUM SERPL-SCNC: 137 MMOL/L — SIGNIFICANT CHANGE UP (ref 135–145)
WBC # BLD: 7.42 K/UL — SIGNIFICANT CHANGE UP (ref 3.8–10.5)
WBC # FLD AUTO: 7.42 K/UL — SIGNIFICANT CHANGE UP (ref 3.8–10.5)

## 2022-03-24 RX ORDER — POTASSIUM CHLORIDE 20 MEQ
40 PACKET (EA) ORAL ONCE
Refills: 0 | Status: DISCONTINUED | OUTPATIENT
Start: 2022-03-24 | End: 2022-03-26

## 2022-03-24 RX ORDER — VANCOMYCIN HCL 1 G
1000 VIAL (EA) INTRAVENOUS ONCE
Refills: 0 | Status: COMPLETED | OUTPATIENT
Start: 2022-03-25 | End: 2022-03-25

## 2022-03-24 RX ADMIN — Medication 50 MILLIGRAM(S): at 05:32

## 2022-03-24 RX ADMIN — Medication 50 MILLIGRAM(S): at 12:49

## 2022-03-24 RX ADMIN — Medication 650 MILLIGRAM(S): at 12:48

## 2022-03-24 RX ADMIN — Medication 112 MICROGRAM(S): at 05:31

## 2022-03-24 RX ADMIN — Medication 50 MILLIGRAM(S): at 17:23

## 2022-03-24 RX ADMIN — APIXABAN 2.5 MILLIGRAM(S): 2.5 TABLET, FILM COATED ORAL at 17:23

## 2022-03-24 RX ADMIN — PANTOPRAZOLE SODIUM 40 MILLIGRAM(S): 20 TABLET, DELAYED RELEASE ORAL at 05:32

## 2022-03-24 RX ADMIN — Medication 650 MILLIGRAM(S): at 14:20

## 2022-03-24 RX ADMIN — CLOPIDOGREL BISULFATE 75 MILLIGRAM(S): 75 TABLET, FILM COATED ORAL at 12:49

## 2022-03-24 RX ADMIN — APIXABAN 2.5 MILLIGRAM(S): 2.5 TABLET, FILM COATED ORAL at 05:32

## 2022-03-24 RX ADMIN — Medication 50 MILLIGRAM(S): at 00:01

## 2022-03-24 NOTE — PROGRESS NOTE ADULT - ASSESSMENT
Patient is a 89y old Female with PMH of HTN, HLD, fibromyalgia, spinal stenosis on chronic pain medications, Afib on Eliquis (denies hx of Ablation or DCCV), CVA, CAD s/p WAGNER to LAD in 12/21 at [facility], hypothyroidism, anxiety, sent by her Cardiologist Dr. Galloway for Afib with RVR, and intermittent bradycardia for PPM evaluation. Patient is a poor historian and appears extremely anxious. Patient states she has chest pain along with SOB and palpitation for years. Currently taking Eliquis 2.5mg BID and Metoprolol 25 mg BID routinely, reports has not missed a dose. Of note, patient had PARAMJIT on 12/2/2022 revealed mild-mod MR, Mild MS, Normal LV size and function. Her EKG on 11/27/2021 at [facility] ER showed sinus bradycardia with 1st degree AVB, SD 258ms, QRS 86MS, QT/QTc 486/468ms. EKG today showed Afib/AT with V rate at 166bpm, QT/QTc 290/481ms, QRS 88ms. In light of symptomatic Afib with RVR, and TBS as evidence by bradycardia , patient would likely benefit from PPM. The process of the procedures along with the risks and benefits for each procedure were explained in detail to patient and her daughter which included but not limited to bleeding requiring transfusion, infection, stroke, plural effusion, esophageal injury, pericardial effusion, cardiac tamponade requiring chest tube, intubation, and death. Patient expressed understanding and all questions were answered. Patient and her daughter are both agreeable to the procedure.       Afib/AT with TBS    - S/p PPM on 3/24; site clean dry and intact- pressure dressing in place; no hematoma noted  - Discharge instructions reviewed with patient and written instructions given  Patient states she understands she has to remove outer dressing 24 hours post, after which can shower with only water at the site  Was told not to rub, apply lotion, soap or cream to site  Activity restrictions reviewed including not lifting left arm over shoulder and lifting >10 lbs until cleared at his follow up appointment   Patient will call office [phone] if she has any questions/concerns or experiences fever, chills, or redness/swelling/increased pain at the incision site   Patient directed to the closest ER if experiencing any severe symptoms including chest pain, SOB, lightheadedness, dizziness, syncope  Patient understands no MRI x 6 weeks after procedure   Follow up appointment date and time given to patient - 4/8 at 11:40 with Dr Chepe Jade MD  - Resume eliquis on 3/27   - Metoprolol to 50mg Q6 for rate control  - Continuous telemetric monitoring   - Monitor electrolytes and replete K to 4 and Mg to 2  - Continue Eliquis for thromboembolic ppx given that patient has a [id number] score of 7  - Continue care per primary team

## 2022-03-24 NOTE — PROGRESS NOTE ADULT - ASSESSMENT
89 yo woman w/ HTN, HLD, fibromyalgia, spinal stenosis on chronic pain meds, anxiety on prn valium, afib on eliquis, CVA, CAD s/p PCI, hypothyroid who presents from cardiologist office for rapid afib w/ intermittent bradycardia and PPM evaluation.    Problem/Plan - 1:  ·  Problem: Atrial fibrillation with rapid ventricular response.   ·  Plan: intermittent rapid afib w/ giselle episodes likely responsible for pt's persistent dyspnea, palpitations and chronic chest discomfort. Concern for possible tachybrady syndrome  -telemetry monitoring  -continue metoprolol 50 mg q6h and eliquis   -TTE pending  -appreciate EP    Problem/Plan - 2:  ·  Problem: Coronary artery disease.   ·  Plan: pt w/ chronic chest pain reportedly, plan as above. Pt reports she is no longer on aspirin, just plavix and eliquis. Trops stable. ekg w/ rapid afib  -continue metoprolol, plavix, eliquis, statin    Problem/Plan - 3:  ·  Problem: Anxiety.   ·  Plan: pt takes valium qhs prn, will restart.    Problem/Plan - 4:  ·  Problem: Hypothyroid.   ·  Plan: tsh wnl, resume synthrod.    Problem/Plan - 5:  ·  Problem: Spinal stenosis.   ·  Plan: pt reports she is on chronic tylenol#4. Per medrec in paper chart pt also on percocets, will resume this. Pt with chronic pain and fibromyalgia.    Problem/Plan - 6:  ·  Problem: Need for prophylactic measure.   ·  Plan: eliquis for dvt ppx     89 yo woman w/ HTN, HLD, fibromyalgia, spinal stenosis on chronic pain meds, anxiety on prn valium, afib on eliquis, CVA, CAD s/p PCI, hypothyroid who presents from cardiologist office for rapid afib w/ intermittent bradycardia and PPM evaluation.    Problem/Plan - 1:  ·  Problem: Atrial fibrillation with rapid ventricular response.   ·  Plan: intermittent rapid afib w/ giselle episodes likely responsible for pt's persistent dyspnea, palpitations and chronic chest discomfort. Concern for possible tachybrady syndrome  -telemetry monitoring  -continue metoprolol 50 mg q6h and eliquis   -TTE pending  -NPO for possible PPM 3/24/22  -appreciate EP    Problem/Plan - 2:  ·  Problem: Coronary artery disease.   ·  Plan: pt w/ chronic chest pain reportedly, plan as above. Pt reports she is no longer on aspirin, just plavix and eliquis. Trops stable. ekg w/ rapid afib  -continue metoprolol, plavix, eliquis, statin    Problem/Plan - 3:  ·  Problem: Anxiety.   ·  Plan: pt takes valium qhs prn, will restart.    Problem/Plan - 4:  ·  Problem: Hypothyroid.   ·  Plan: tsh wnl, resume synthrod.    Problem/Plan - 5:  ·  Problem: Spinal stenosis.   ·  Plan: pt reports she is on chronic tylenol#4. Per medrec in paper chart pt also on percocets, will resume this. Pt with chronic pain and fibromyalgia.    Problem/Plan - 6:  ·  Problem: Need for prophylactic measure.   ·  Plan: eliquis for dvt ppx

## 2022-03-24 NOTE — PROGRESS NOTE ADULT - NS ATTEND AMEND GEN_ALL_CORE FT
Patient is a 89y old Female with PMH of HTN, HLD, fibromyalgia, spinal stenosis on chronic pain medications, Afib on Eliquis (denies hx of Ablation or DCCV), CVA, CAD s/p WAGNER to LAD in 12/21 at [facility], hypothyroidism, anxiety, sent by her Cardiologist Dr. Galloway for Afib with RVR, and intermittent bradycardia for PPM evaluation. Patient is a poor historian and appears extremely anxious. Patient states she has chest pain along with SOB and palpitation for years. Currently taking Eliquis 2.5mg BID and Metoprolol 25 mg BID routinely, reports has not missed a dose. Of note, patient had PARAMJIT on 12/2/2022 revealed mild-mod MR, Mild MS, Normal LV size and function. Her EKG on 11/27/2021 at [facility] ER showed sinus bradycardia with 1st degree AVB, AK 258ms, QRS 86MS, QT/QTc 486/468ms. EKG today showed AT with V rate at 166bpm, QT/QTc 290/481ms, QRS 88ms. In light of symptomatic AT with RVR, and TBS as evidence by bradycardia , patient underwent implantation of PPM on 3/24; site clean dry and intact- pressure dressing in place; no hematoma noted. May be discharged home.
Patient is a 89y old Female with PMH of HTN, HLD, fibromyalgia, spinal stenosis on chronic pain medications, Afib on Eliquis (denies hx of Ablation or DCCV), CVA, CAD s/p WAGNER to LAD in 12/21 at [facility], hypothyroidism, anxiety, sent by her Cardiologist Dr. Galloway for Afib with RVR, and intermittent bradycardia for PPM evaluation. Patient is a poor historian and appears extremely anxious. Patient states she has chest pain along with SOB and palpitation for years. Currently taking Eliquis 2.5mg BID and Metoprolol 25 mg BID routinely, reports has not missed a dose. Of note, patient had PARAMJIT on 12/2/2022 revealed mild-mod MR, Mild MS, Normal LV size and function. Her EKG on 11/27/2021 at [facility] ER showed sinus bradycardia with 1st degree AVB, FL 258ms, QRS 86MS, QT/QTc 486/468ms. EKG today showed Afib/AT with V rate at 166bpm, QT/QTc 290/481ms, QRS 88ms. In light of symptomatic Afib with RVR, and TBS as evidence by bradycardia , patient would likely benefit from PPM. The process of the procedures along with the risks and benefits for each procedure were explained in detail to patient and her daughter which included but not limited to bleeding requiring transfusion, infection, stroke, plural effusion, esophageal injury, pericardial effusion, cardiac tamponade requiring chest tube, intubation, and death. Patient expressed understanding and all questions were answered. Patient and her daughter are both agreeable to the procedure.       Afib/AT with TBS    - Plan for PPM on 3/23. AM labs, Keep NPO  - Increase Metoprolol to 50mg Q6 for rate control and up-titrate if BP allows  - please obtain ECHO to assess LV/RV function  - Continuous telemetric monitoring   - Monitor electrolytes and replete K to 4 and Mg to 2  - Continue Eliquis for thromboembolic ppx given that patient has a [id number] score of 7

## 2022-03-25 LAB
ANION GAP SERPL CALC-SCNC: 11 MMOL/L — SIGNIFICANT CHANGE UP (ref 7–14)
BUN SERPL-MCNC: 18 MG/DL — SIGNIFICANT CHANGE UP (ref 7–23)
CALCIUM SERPL-MCNC: 9.2 MG/DL — SIGNIFICANT CHANGE UP (ref 8.4–10.5)
CHLORIDE SERPL-SCNC: 103 MMOL/L — SIGNIFICANT CHANGE UP (ref 98–107)
CO2 SERPL-SCNC: 21 MMOL/L — LOW (ref 22–31)
CREAT SERPL-MCNC: 0.97 MG/DL — SIGNIFICANT CHANGE UP (ref 0.5–1.3)
EGFR: 54 ML/MIN/1.73M2 — LOW
GLUCOSE SERPL-MCNC: 98 MG/DL — SIGNIFICANT CHANGE UP (ref 70–99)
HCT VFR BLD CALC: 41.4 % — SIGNIFICANT CHANGE UP (ref 34.5–45)
HGB BLD-MCNC: 14.3 G/DL — SIGNIFICANT CHANGE UP (ref 11.5–15.5)
MCHC RBC-ENTMCNC: 30.6 PG — SIGNIFICANT CHANGE UP (ref 27–34)
MCHC RBC-ENTMCNC: 34.5 GM/DL — SIGNIFICANT CHANGE UP (ref 32–36)
MCV RBC AUTO: 88.7 FL — SIGNIFICANT CHANGE UP (ref 80–100)
NRBC # BLD: 0 /100 WBCS — SIGNIFICANT CHANGE UP
NRBC # FLD: 0 K/UL — SIGNIFICANT CHANGE UP
PLATELET # BLD AUTO: 209 K/UL — SIGNIFICANT CHANGE UP (ref 150–400)
POTASSIUM SERPL-MCNC: 3.6 MMOL/L — SIGNIFICANT CHANGE UP (ref 3.5–5.3)
POTASSIUM SERPL-SCNC: 3.6 MMOL/L — SIGNIFICANT CHANGE UP (ref 3.5–5.3)
RBC # BLD: 4.67 M/UL — SIGNIFICANT CHANGE UP (ref 3.8–5.2)
RBC # FLD: 13 % — SIGNIFICANT CHANGE UP (ref 10.3–14.5)
SODIUM SERPL-SCNC: 135 MMOL/L — SIGNIFICANT CHANGE UP (ref 135–145)
WBC # BLD: 8.84 K/UL — SIGNIFICANT CHANGE UP (ref 3.8–10.5)
WBC # FLD AUTO: 8.84 K/UL — SIGNIFICANT CHANGE UP (ref 3.8–10.5)

## 2022-03-25 PROCEDURE — 93010 ELECTROCARDIOGRAM REPORT: CPT

## 2022-03-25 PROCEDURE — 71045 X-RAY EXAM CHEST 1 VIEW: CPT | Mod: 26

## 2022-03-25 RX ORDER — PHENYLEPHRINE HYDROCHLORIDE 10 MG/ML
0.1 INJECTION INTRAVENOUS
Qty: 40 | Refills: 0 | Status: DISCONTINUED | OUTPATIENT
Start: 2022-03-25 | End: 2022-03-25

## 2022-03-25 RX ORDER — HYDROMORPHONE HYDROCHLORIDE 2 MG/ML
0.5 INJECTION INTRAMUSCULAR; INTRAVENOUS; SUBCUTANEOUS
Refills: 0 | Status: DISCONTINUED | OUTPATIENT
Start: 2022-03-25 | End: 2022-03-25

## 2022-03-25 RX ORDER — HYDROMORPHONE HYDROCHLORIDE 2 MG/ML
0.3 INJECTION INTRAMUSCULAR; INTRAVENOUS; SUBCUTANEOUS
Refills: 0 | Status: DISCONTINUED | OUTPATIENT
Start: 2022-03-25 | End: 2022-03-25

## 2022-03-25 RX ORDER — ONDANSETRON 8 MG/1
4 TABLET, FILM COATED ORAL ONCE
Refills: 0 | Status: DISCONTINUED | OUTPATIENT
Start: 2022-03-25 | End: 2022-03-25

## 2022-03-25 RX ORDER — ACETAMINOPHEN 500 MG
650 TABLET ORAL ONCE
Refills: 0 | Status: COMPLETED | OUTPATIENT
Start: 2022-03-25 | End: 2022-03-25

## 2022-03-25 RX ADMIN — PHENYLEPHRINE HYDROCHLORIDE 1.69 MICROGRAM(S)/KG/MIN: 10 INJECTION INTRAVENOUS at 03:03

## 2022-03-25 RX ADMIN — ATORVASTATIN CALCIUM 20 MILLIGRAM(S): 80 TABLET, FILM COATED ORAL at 21:45

## 2022-03-25 RX ADMIN — PANTOPRAZOLE SODIUM 40 MILLIGRAM(S): 20 TABLET, DELAYED RELEASE ORAL at 07:36

## 2022-03-25 RX ADMIN — OXYCODONE HYDROCHLORIDE 5 MILLIGRAM(S): 5 TABLET ORAL at 02:31

## 2022-03-25 RX ADMIN — OXYCODONE HYDROCHLORIDE 5 MILLIGRAM(S): 5 TABLET ORAL at 03:41

## 2022-03-25 RX ADMIN — OXYCODONE HYDROCHLORIDE 5 MILLIGRAM(S): 5 TABLET ORAL at 11:30

## 2022-03-25 RX ADMIN — Medication 250 MILLIGRAM(S): at 10:11

## 2022-03-25 RX ADMIN — CLOPIDOGREL BISULFATE 75 MILLIGRAM(S): 75 TABLET, FILM COATED ORAL at 14:20

## 2022-03-25 RX ADMIN — OXYCODONE HYDROCHLORIDE 5 MILLIGRAM(S): 5 TABLET ORAL at 10:31

## 2022-03-25 RX ADMIN — Medication 50 MILLIGRAM(S): at 14:19

## 2022-03-25 RX ADMIN — Medication 50 MILLIGRAM(S): at 21:45

## 2022-03-25 RX ADMIN — Medication 650 MILLIGRAM(S): at 14:54

## 2022-03-25 RX ADMIN — Medication 112 MICROGRAM(S): at 06:16

## 2022-03-25 RX ADMIN — Medication 650 MILLIGRAM(S): at 15:30

## 2022-03-25 NOTE — PHYSICAL THERAPY INITIAL EVALUATION ADULT - DISCHARGE DISPOSITION, PT EVAL
Anticipate home, no skilled PT needs. Will continue to follow while at [facility],

## 2022-03-25 NOTE — PHYSICAL THERAPY INITIAL EVALUATION ADULT - DIAGNOSIS, PT EVAL
Pt presents with decreased functional mobility; will benefit from PT services while at [facility]

## 2022-03-25 NOTE — PHYSICAL THERAPY INITIAL EVALUATION ADULT - RANGE OF MOTION EXAMINATION, REHAB EVAL
bilateral shoulder flexion 0-90 only; elbow/wrist WFL/bilateral lower extremity ROM was WFL (within functional limits)

## 2022-03-25 NOTE — PROGRESS NOTE ADULT - SUBJECTIVE AND OBJECTIVE BOX
Interval History:  No acute events overnight     MEDICATIONS  (STANDING):  apixaban 2.5 milliGRAM(s) Oral two times a day  atorvastatin 20 milliGRAM(s) Oral at bedtime  clopidogrel Tablet 75 milliGRAM(s) Oral daily  levothyroxine 112 MICROGram(s) Oral daily  metoprolol tartrate 50 milliGRAM(s) Oral every 6 hours  pantoprazole    Tablet 40 milliGRAM(s) Oral before breakfast  phenylephrine    Infusion 0.1 MICROgram(s)/kG/Min (1.69 mL/Hr) IV Continuous <Continuous>  potassium chloride    Tablet ER 40 milliEquivalent(s) Oral once    MEDICATIONS  (PRN):  acetaminophen     Tablet .. 650 milliGRAM(s) Oral every 6 hours PRN Temp greater or equal to 38C (100.4F), Mild Pain (1 - 3)  aluminum hydroxide/magnesium hydroxide/simethicone Suspension 30 milliLiter(s) Oral every 4 hours PRN Dyspepsia  diazepam    Tablet 5 milliGRAM(s) Oral at bedtime PRN anxiety  oxyCODONE    IR 5 milliGRAM(s) Oral every 8 hours PRN Severe Pain (7 - 10)    Vital Signs Last 24 Hrs  T(C): 36.6 (03-25-22 @ 07:30), Max: 36.6 (03-25-22 @ 07:30)  T(F): 97.9 (03-25-22 @ 07:30), Max: 97.9 (03-25-22 @ 07:30)  HR: 80 (03-25-22 @ 07:45) (40 - 100)  BP: 114/63 (03-25-22 @ 07:45) (72/43 - 155/99)  BP(mean): 75 (03-25-22 @ 07:45) (49 - 98)  RR: 21 (03-25-22 @ 07:45) (11 - 23)  SpO2: 94% (03-25-22 @ 07:45) (91% - 100%)    LABS:	 	  CBC Full  -  ( 25 Mar 2022 05:45 )  WBC Count : 8.84 K/uL  Hemoglobin : 14.3 g/dL  Hematocrit : 41.4 %  Platelet Count - Automated : 209 K/uL  Mean Cell Volume : 88.7 fL  Mean Cell Hemoglobin : 30.6 pg  Mean Cell Hemoglobin Concentration : 34.5 gm/dL  Auto Neutrophil # : x  Auto Lymphocyte # : x  Auto Monocyte # : x  Auto Eosinophil # : x  Auto Basophil # : x  Auto Neutrophil % : x  Auto Lymphocyte % : x  Auto Monocyte % : x  Auto Eosinophil % : x  Auto Basophil % : x    03-25    135  |  103  |  18  ----------------------------<  98  3.6   |  21<L>  |  0.97  03-24    137  |  101  |  24<H>  ----------------------------<  110<H>  3.8   |  23  |  1.01    Ca    9.2      25 Mar 2022 05:45  Ca    9.6      24 Mar 2022 06:53  Phos  3.3     03-24  Mg     2.00     03-24      PT/INR - ( 24 Mar 2022 06:53 )   PT: 15.4 sec;   INR: 1.32 ratio         PTT - ( 24 Mar 2022 06:53 )  PTT:37.6 sec        
S/p dual-chamber PPM placement yesterday  Very anxious this afternoon  No CP, SOB, palpitations or dizziness    Vital Signs Last 24 Hrs  T(C): 36.4 (25 Mar 2022 11:46), Max: 36.7 (25 Mar 2022 10:00)  T(F): 97.5 (25 Mar 2022 11:46), Max: 98 (25 Mar 2022 10:00)  HR: 95 (25 Mar 2022 11:46) (40 - 100)  BP: 136/61 (25 Mar 2022 11:46) (72/43 - 145/93)  BP(mean): 75 (25 Mar 2022 07:45) (49 - 98)  RR: 18 (25 Mar 2022 11:46) (11 - 23)  SpO2: 100% (25 Mar 2022 11:46) (91% - 100%)    I&O's Summary    03-24-22 @ 07:01  -  03-25-22 @ 07:00  --------------------------------------------------------  IN: 255.5 mL / OUT: 150 mL / NET: 105.5 mL          GENERAL: NAD, well-developed, well-nourished  HEENT: NCAT, EOMI  NECK: Supple, No JVD  CHEST/LUNG: Clear to auscultation bilaterally; No wheezes, rales or rhonchi  HEART: Irregular rhythm; No murmurs, rubs, or gallops, (+)S1, S2  ABDOMEN: Soft, Nontender, Nondistended; Normal Bowel sounds   EXTREMITIES:  2+ Peripheral Pulses, No clubbing, cyanosis, or edema  PSYCH: anxious appearing  NEUROLOGY: AAOx3, non-focal    LABS:                        14.3   8.84  )-----------( 209      ( 25 Mar 2022 05:45 )             41.4     03-25    135  |  103  |  18  ----------------------------<  98  3.6   |  21<L>  |  0.97    Ca    9.2      25 Mar 2022 05:45  Phos  3.3     03-24  Mg     2.00     03-24      PT/INR - ( 24 Mar 2022 06:53 )   PT: 15.4 sec;   INR: 1.32 ratio         PTT - ( 24 Mar 2022 06:53 )  PTT:37.6 sec  CAPILLARY BLOOD GLUCOSE                RADIOLOGY & ADDITIONAL TESTS:    Imaging Personally Reviewed:  [x] YES  [ ] NO    Will obtain old records:  [ ] YES  [x] NO        
Interval History:  No acute events overnight   Telemetry: AFib with episodes of RVR     MEDICATIONS  (STANDING):  apixaban 2.5 milliGRAM(s) Oral two times a day  atorvastatin 20 milliGRAM(s) Oral at bedtime  clopidogrel Tablet 75 milliGRAM(s) Oral daily  levothyroxine 112 MICROGram(s) Oral daily  metoprolol tartrate 25 milliGRAM(s) Oral every 6 hours  pantoprazole    Tablet 40 milliGRAM(s) Oral before breakfast  potassium chloride  10 mEq/100 mL IVPB 10 milliEquivalent(s) IV Intermittent every 1 hour    MEDICATIONS  (PRN):  acetaminophen     Tablet .. 650 milliGRAM(s) Oral every 6 hours PRN Temp greater or equal to 38C (100.4F), Mild Pain (1 - 3)  aluminum hydroxide/magnesium hydroxide/simethicone Suspension 30 milliLiter(s) Oral every 4 hours PRN Dyspepsia  diazepam    Tablet 5 milliGRAM(s) Oral at bedtime PRN anxiety  oxyCODONE    IR 5 milliGRAM(s) Oral every 8 hours PRN Severe Pain (7 - 10)    Vital Signs Last 24 Hrs  T(C): 36.3 (03-23-22 @ 06:20), Max: 36.9 (03-22-22 @ 22:30)  T(F): 97.4 (03-23-22 @ 06:20), Max: 98.4 (03-22-22 @ 22:30)  HR: 93 (03-23-22 @ 06:20) (78 - 133)  BP: 146/96 (03-23-22 @ 06:20) (122/75 - 149/93)  BP(mean): --  RR: 17 (03-23-22 @ 06:20) (16 - 18)  SpO2: 98% (03-23-22 @ 06:20) (97% - 100%)    LABS:	 	  CBC Full  -  ( 23 Mar 2022 06:37 )  WBC Count : 6.06 K/uL  Hemoglobin : 14.8 g/dL  Hematocrit : 42.1 %  Platelet Count - Automated : 204 K/uL  Mean Cell Volume : 87.2 fL  Mean Cell Hemoglobin : 30.6 pg  Mean Cell Hemoglobin Concentration : 35.2 gm/dL  Auto Neutrophil # : 3.53 K/uL  Auto Lymphocyte # : 1.72 K/uL  Auto Monocyte # : 0.61 K/uL  Auto Eosinophil # : 0.14 K/uL  Auto Basophil # : 0.03 K/uL  Auto Neutrophil % : 58.2 %  Auto Lymphocyte % : 28.4 %  Auto Monocyte % : 10.1 %  Auto Eosinophil % : 2.3 %  Auto Basophil % : 0.5 %    03-23    135  |  99  |  17  ----------------------------<  106<H>  3.4<L>   |  24  |  0.79  03-22    133<L>  |  95<L>  |  19  ----------------------------<  132<H>  4.3   |  23  |  0.82    Ca    9.8      23 Mar 2022 06:37  Ca    10.1      22 Mar 2022 17:16  Mg     2.00     03-23  Mg     1.90     03-22    TPro  6.5  /  Alb  4.2  /  TBili  0.9  /  DBili  0.2  /  AST  19  /  ALT  12  /  AlkPhos  69  03-23  TPro  7.6  /  Alb  4.9  /  TBili  1.0  /  DBili  x   /  AST  34<H>  /  ALT  19  /  AlkPhos  81  03-22    PT/INR - ( 23 Mar 2022 06:37 )   PT: 16.7 sec;   INR: 1.43 ratio     proBNP: Serum Pro-Brain Natriuretic Peptide: 3876 pg/mL (03-22 @ 17:16)    TSH: Thyroid Stimulating Hormone, Serum: 1.92 uIU/mL (03-22 @ 17:18)    LIVER FUNCTIONS - ( 23 Mar 2022 06:37 )  Alb: 4.2 g/dL / Pro: 6.5 g/dL / ALK PHOS: 69 U/L / ALT: 12 U/L / AST: 19 U/L / GGT: x         
ANESTHESIA POSTOP CHECK    89y Female POSTOP DAY 1 S/P pacemaker insertion    Vital Signs Last 24 Hrs  T(C): 36.7 (25 Mar 2022 10:00), Max: 36.7 (25 Mar 2022 10:00)  T(F): 98 (25 Mar 2022 10:00), Max: 98 (25 Mar 2022 10:00)  HR: 93 (25 Mar 2022 10:00) (40 - 100)  BP: 145/93 (25 Mar 2022 10:00) (72/43 - 155/99)  BP(mean): 75 (25 Mar 2022 07:45) (49 - 98)  RR: 18 (25 Mar 2022 10:00) (11 - 23)  SpO2: 99% (25 Mar 2022 10:00) (91% - 100%)  I&O's Summary    24 Mar 2022 07:01  -  25 Mar 2022 07:00  --------------------------------------------------------  IN: 255.5 mL / OUT: 150 mL / NET: 105.5 mL        [x ] NO APPARENT ANESTHESIA COMPLICATIONS      Comments: 
Better rate-controlled overnight  Felt some palpitations last evening, but not sustained  No CP or SOB    Vital Signs Last 24 Hrs  T(C): 36.4 (24 Mar 2022 09:00), Max: 36.7 (23 Mar 2022 13:12)  T(F): 97.5 (24 Mar 2022 09:00), Max: 98.1 (23 Mar 2022 13:12)  HR: 89 (24 Mar 2022 09:00) (85 - 97)  BP: 154/99 (24 Mar 2022 09:00) (126/84 - 154/99)  BP(mean): --  RR: 17 (24 Mar 2022 09:00) (16 - 18)  SpO2: 98% (24 Mar 2022 09:00) (98% - 100%)    I&O's Summary      GENERAL: NAD, well-developed, well-nourished  HEENT: NCAT, EOMI  NECK: Supple, No JVD  CHEST/LUNG: Clear to auscultation bilaterally; No wheezes, rales or rhonchi  HEART: Irregular rhythm; No murmurs, rubs, or gallops, (+)S1, S2  ABDOMEN: Soft, Nontender, Nondistended; Normal Bowel sounds   EXTREMITIES:  2+ Peripheral Pulses, No clubbing, cyanosis, or edema  PSYCH: anxious appearing  NEUROLOGY: AAOx3, non-focal    LABS:                        17.1   7.42  )-----------( 255      ( 24 Mar 2022 06:53 )             47.6     03-24    137  |  101  |  24<H>  ----------------------------<  110<H>  3.8   |  23  |  1.01    Ca    9.6      24 Mar 2022 06:53  Phos  3.3     03-24  Mg     2.00     03-24    TPro  6.5  /  Alb  4.2  /  TBili  0.9  /  DBili  0.2  /  AST  19  /  ALT  12  /  AlkPhos  69  03-23    PT/INR - ( 24 Mar 2022 06:53 )   PT: 15.4 sec;   INR: 1.32 ratio         PTT - ( 24 Mar 2022 06:53 )  PTT:37.6 sec  CAPILLARY BLOOD GLUCOSE            Urinalysis Basic - ( 22 Mar 2022 19:10 )    Color: Colorless / Appearance: Clear / S.008 / pH: x  Gluc: x / Ketone: Negative  / Bili: Negative / Urobili: <2 mg/dL   Blood: x / Protein: Negative / Nitrite: Negative   Leuk Esterase: Negative / RBC: x / WBC x   Sq Epi: x / Non Sq Epi: x / Bacteria: x        RADIOLOGY & ADDITIONAL TESTS:    Imaging Personally Reviewed:  [x] YES  [ ] NO    Will obtain old records:  [ ] YES  [x] NO            
No CP or SOB  Remains in afib up to 130s before MN last night    Vital Signs Last 24 Hrs  T(C): 36.3 (23 Mar 2022 06:20), Max: 36.9 (22 Mar 2022 22:30)  T(F): 97.4 (23 Mar 2022 06:20), Max: 98.4 (22 Mar 2022 22:30)  HR: 93 (23 Mar 2022 06:20) (78 - 133)  BP: 146/96 (23 Mar 2022 06:20) (122/75 - 149/93)  BP(mean): --  RR: 17 (23 Mar 2022 06:20) (16 - 18)  SpO2: 98% (23 Mar 2022 06:20) (97% - 100%)    I&O's Summary      GENERAL: NAD, well-developed, well-nourished  HEAD:  Atraumatic, Normocephalic  EYES: EOMI, PERRLA, conjunctiva and sclera clear  NECK: Supple, No JVD  CHEST/LUNG: Clear to auscultation bilaterally; No wheezes, rales or rhonchi  HEART: tachycardic, irregular; No murmurs, rubs, or gallops, (+)S1, S2  ABDOMEN: Soft, Nontender, Nondistended; Normal Bowel sounds   EXTREMITIES:  2+ Peripheral Pulses, No clubbing, cyanosis, or edema  PSYCH: anxious appearing  NEUROLOGY: AAOx3, non-focal  SKIN: No rashes or lesions    LABS:                        14.8   6.06  )-----------( 204      ( 23 Mar 2022 06:37 )             42.1     03-    135  |  99  |  17  ----------------------------<  106<H>  3.4<L>   |  24  |  0.79    Ca    9.8      23 Mar 2022 06:37  Mg     2.00     -    TPro  6.5  /  Alb  4.2  /  TBili  0.9  /  DBili  0.2  /  AST  19  /  ALT  12  /  AlkPhos  69  03-23    PT/INR - ( 23 Mar 2022 06:37 )   PT: 16.7 sec;   INR: 1.43 ratio           CAPILLARY BLOOD GLUCOSE            Urinalysis Basic - ( 22 Mar 2022 19:10 )    Color: Colorless / Appearance: Clear / S.008 / pH: x  Gluc: x / Ketone: Negative  / Bili: Negative / Urobili: <2 mg/dL   Blood: x / Protein: Negative / Nitrite: Negative   Leuk Esterase: Negative / RBC: x / WBC x   Sq Epi: x / Non Sq Epi: x / Bacteria: x        RADIOLOGY & ADDITIONAL TESTS:    Imaging Personally Reviewed:  [x] YES  [ ] NO    Will obtain old records:  [ ] YES  [x] NO

## 2022-03-25 NOTE — CHART NOTE - NSCHARTNOTEFT_GEN_A_CORE
CAREN JOHNSON  [id number]    PROCEDURE:  implant of left sided dual chamber pacemaker (MDT)      INDICATION:  sick sinus syndrome  tachy giselle syndrome      ELECTROPHYSIOLOGIST(S):  Dr. Jade  Fellow Dr. Julian      ANESTHESIOLOGY:  MAC, local      FINDINGS:  - uncomplicated implant of left sided dual chamber pacemaker (MDT) via left axillary vein puncture, device wrapped in Tyrx pouch, pressure dressing applied as patient was fully anti-coagulated and will resume anti-coagulation       COMPLICATIONS:  none      RECOMMENDATIONS:  - CXR PA/Lat before discharge

## 2022-03-25 NOTE — PHYSICAL THERAPY INITIAL EVALUATION ADULT - PATIENT PROFILE REVIEW, REHAB EVAL
PT evaluate and treat orders received: Out of bed with assistance. Consult with RN Farheen KRISHNAN, pt may participate in PT evaluation./yes

## 2022-03-25 NOTE — PHYSICAL THERAPY INITIAL EVALUATION ADULT - PERTINENT HX OF CURRENT PROBLEM, REHAB EVAL
Pt is a 89 year old female presents from cardiologist office for rapid afib w/ intermittent bradycardia. Pt s/p PPM placement. PMH listed below.

## 2022-03-25 NOTE — PROGRESS NOTE ADULT - PROVIDER SPECIALTY LIST ADULT
Internal Medicine
Anesthesia
Internal Medicine
Electrophysiology
Electrophysiology
Internal Medicine

## 2022-03-25 NOTE — PROGRESS NOTE ADULT - ASSESSMENT
89 yo woman w/ HTN, HLD, fibromyalgia, spinal stenosis on chronic pain meds, anxiety on prn valium, afib on eliquis, CVA, CAD s/p PCI, hypothyroid who presents from cardiologist office for rapid afib w/ intermittent bradycardia and PPM evaluation.    Problem/Plan - 1:  ·  Problem: Atrial fibrillation with rapid ventricular response.   ·  Plan: intermittent rapid afib w/ giselle episodes likely responsible for pt's persistent dyspnea, palpitations and chronic chest discomfort. Concern for possible tachybrady syndrome  -telemetry monitoring  -continue metoprolol 50 mg q6h and eliquis   -TTE 3/23/22 a Moderate concentric left ventricular hypertrophy, normal left ventricular systolic function, no segmental  wall motion abnormalities.  -S/p dual-chamber PPM 3/24/22  -appreciate EP    Problem/Plan - 2:  ·  Problem: Coronary artery disease.   ·  Plan: pt w/ chronic chest pain reportedly, plan as above. Pt reports she is no longer on aspirin, just plavix and eliquis. Trops stable. ekg w/ rapid afib  -continue metoprolol, plavix, eliquis, statin    Problem/Plan - 3:  ·  Problem: Anxiety.   ·  Plan: pt takes valium qhs prn, will restart.    Problem/Plan - 4:  ·  Problem: Hypothyroid.   ·  Plan: tsh wnl, resume synthrod.    Problem/Plan - 5:  ·  Problem: Spinal stenosis.   ·  Plan: pt reports she is on chronic tylenol#4. Per medrec in paper chart pt also on percocets, will resume this. Pt with chronic pain and fibromyalgia.    Problem/Plan - 6:  ·  Problem: Need for prophylactic measure.   ·  Plan: eliquis for dvt ppx

## 2022-03-25 NOTE — PHYSICAL THERAPY INITIAL EVALUATION ADULT - ADDITIONAL COMMENTS
Pt lives in an apartment with elevator access; no stairs to negotiate. Prior to admission, pt ambulated independently. Endorses furniture surfing at times, owns rollator and cane.

## 2022-03-26 ENCOUNTER — TRANSCRIPTION ENCOUNTER (OUTPATIENT)
Age: 87
End: 2022-03-26

## 2022-03-26 VITALS
OXYGEN SATURATION: 98 % | HEART RATE: 82 BPM | TEMPERATURE: 97 F | SYSTOLIC BLOOD PRESSURE: 115 MMHG | RESPIRATION RATE: 17 BRPM | DIASTOLIC BLOOD PRESSURE: 70 MMHG

## 2022-03-26 LAB
ANION GAP SERPL CALC-SCNC: 12 MMOL/L — SIGNIFICANT CHANGE UP (ref 7–14)
BUN SERPL-MCNC: 22 MG/DL — SIGNIFICANT CHANGE UP (ref 7–23)
CALCIUM SERPL-MCNC: 9.2 MG/DL — SIGNIFICANT CHANGE UP (ref 8.4–10.5)
CHLORIDE SERPL-SCNC: 103 MMOL/L — SIGNIFICANT CHANGE UP (ref 98–107)
CO2 SERPL-SCNC: 21 MMOL/L — LOW (ref 22–31)
CREAT SERPL-MCNC: 0.88 MG/DL — SIGNIFICANT CHANGE UP (ref 0.5–1.3)
EGFR: 61 ML/MIN/1.73M2 — SIGNIFICANT CHANGE UP
GLUCOSE SERPL-MCNC: 92 MG/DL — SIGNIFICANT CHANGE UP (ref 70–99)
HCT VFR BLD CALC: 37.4 % — SIGNIFICANT CHANGE UP (ref 34.5–45)
HGB BLD-MCNC: 13 G/DL — SIGNIFICANT CHANGE UP (ref 11.5–15.5)
MAGNESIUM SERPL-MCNC: 2 MG/DL — SIGNIFICANT CHANGE UP (ref 1.6–2.6)
MCHC RBC-ENTMCNC: 30.3 PG — SIGNIFICANT CHANGE UP (ref 27–34)
MCHC RBC-ENTMCNC: 34.8 GM/DL — SIGNIFICANT CHANGE UP (ref 32–36)
MCV RBC AUTO: 87.2 FL — SIGNIFICANT CHANGE UP (ref 80–100)
NRBC # BLD: 0 /100 WBCS — SIGNIFICANT CHANGE UP
NRBC # FLD: 0 K/UL — SIGNIFICANT CHANGE UP
PHOSPHATE SERPL-MCNC: 3.5 MG/DL — SIGNIFICANT CHANGE UP (ref 2.5–4.5)
PLATELET # BLD AUTO: 192 K/UL — SIGNIFICANT CHANGE UP (ref 150–400)
POTASSIUM SERPL-MCNC: 3.4 MMOL/L — LOW (ref 3.5–5.3)
POTASSIUM SERPL-SCNC: 3.4 MMOL/L — LOW (ref 3.5–5.3)
RBC # BLD: 4.29 M/UL — SIGNIFICANT CHANGE UP (ref 3.8–5.2)
RBC # FLD: 13.2 % — SIGNIFICANT CHANGE UP (ref 10.3–14.5)
SODIUM SERPL-SCNC: 136 MMOL/L — SIGNIFICANT CHANGE UP (ref 135–145)
WBC # BLD: 6.25 K/UL — SIGNIFICANT CHANGE UP (ref 3.8–10.5)
WBC # FLD AUTO: 6.25 K/UL — SIGNIFICANT CHANGE UP (ref 3.8–10.5)

## 2022-03-26 RX ORDER — CLOPIDOGREL BISULFATE 75 MG/1
1 TABLET, FILM COATED ORAL
Qty: 0 | Refills: 0 | DISCHARGE

## 2022-03-26 RX ORDER — METOPROLOL TARTRATE 50 MG
1 TABLET ORAL
Qty: 0 | Refills: 0 | DISCHARGE

## 2022-03-26 RX ORDER — METOPROLOL TARTRATE 50 MG
1 TABLET ORAL
Qty: 60 | Refills: 0
Start: 2022-03-26 | End: 2022-04-24

## 2022-03-26 RX ORDER — POTASSIUM CHLORIDE 20 MEQ
40 PACKET (EA) ORAL EVERY 4 HOURS
Refills: 0 | Status: DISCONTINUED | OUTPATIENT
Start: 2022-03-26 | End: 2022-03-26

## 2022-03-26 RX ADMIN — OXYCODONE HYDROCHLORIDE 5 MILLIGRAM(S): 5 TABLET ORAL at 10:50

## 2022-03-26 RX ADMIN — OXYCODONE HYDROCHLORIDE 5 MILLIGRAM(S): 5 TABLET ORAL at 11:42

## 2022-03-26 RX ADMIN — Medication 40 MILLIEQUIVALENT(S): at 14:19

## 2022-03-26 RX ADMIN — Medication 50 MILLIGRAM(S): at 14:19

## 2022-03-26 RX ADMIN — Medication 112 MICROGRAM(S): at 05:22

## 2022-03-26 RX ADMIN — Medication 50 MILLIGRAM(S): at 09:16

## 2022-03-26 RX ADMIN — Medication 650 MILLIGRAM(S): at 14:19

## 2022-03-26 RX ADMIN — PANTOPRAZOLE SODIUM 40 MILLIGRAM(S): 20 TABLET, DELAYED RELEASE ORAL at 05:22

## 2022-03-26 RX ADMIN — Medication 5 MILLIGRAM(S): at 01:41

## 2022-03-26 RX ADMIN — CLOPIDOGREL BISULFATE 75 MILLIGRAM(S): 75 TABLET, FILM COATED ORAL at 14:19

## 2022-03-26 NOTE — DISCHARGE NOTE PROVIDER - NSDCMRMEDTOKEN_GEN_ALL_CORE_FT
Albuterol (Eqv-ProAir HFA) 90 mcg/inh inhalation aerosol: 2 puff(s) inhaled every 6 hours  atorvastatin 20 mg oral tablet: 1 tab(s) orally once a day  docusate sodium 100 mg oral capsule: 1 cap(s) orally 2 times a day.    Hold for loose stools.   Eliquis 5 mg oral tablet: 1 tab(s) orally 2 times a day  metoprolol tartrate 100 mg oral tablet: 1 tab(s) orally 2 times a day   Multiple Vitamins oral tablet: 1 tab(s) orally once a day  pantoprazole 40 mg oral delayed release tablet: 1 tab(s) orally once a day  Percocet 5 mg-325 mg oral tablet: 1 tab(s) orally every 4 hours, As Needed  Plavix 75 mg oral tablet: 1 tab(s) orally once a day    polyethylene glycol 3350 oral powder for reconstitution: 17 gram(s) orally 2 times a day as needed for constipation  senna oral tablet: 2 tab(s) orally once a day (at bedtime).    Hold for loose stools.   Synthroid 112 mcg (0.112 mg) oral tablet: 1 tab(s) orally once a day  Tylenol with Codeine #4 oral tablet: 1 tab(s) orally every 6 hours, As Needed  Valium 5 mg oral tablet: 1 tab(s) orally 3 times a day, As Needed

## 2022-03-26 NOTE — DISCHARGE NOTE PROVIDER - CARE PROVIDER_API CALL
Chepe Jade (MD)  Cardiac Electrophysiology; Cardiovascular Disease; Internal Medicine  [address] [place] [address], [id number]  [place] [address]  Phone: [phone]  Fax: [phone]  Scheduled Appointment: 04/08/2022 11:30 AM

## 2022-03-26 NOTE — DISCHARGE NOTE PROVIDER - NSDCCPGOAL_GEN_ALL_CORE_FT
Dontrell Mcintosh [facility] [address]  [address] [facility], [address] [place] [address]  [phone]      Medical Progress Note      NAME: Gray Enciso   :  1968  MRM:  [id number]    Date/Time: 2018  8:02 AM       Assessment and Plan:   1. 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 Continue empiric zosyn and Vanco. Check blood and wound cx and mrsa screen. Consult ortho.            Subjective:     Chief Complaint:  Follow up of pt who was admitted with OM of the LT humerus. C/o mild pain     ROS:  (bold if positive, if negative)      Tolerating PT  Tolerating Diet        Objective:     Last 24hrs VS reviewed since prior progress note. Most recent are:    Visit Vitals    /75 (BP 1 Location: Right arm, BP Patient Position: At rest)    Pulse 74    Temp 97.9 °F (36.6 °C)    Resp 16    Ht 5' 4\" (1.626 m)    Wt 65.3 kg (144 lb)    SpO2 99%    BMI 24.72 kg/m2     SpO2 Readings from Last 6 Encounters:   18 99%          Intake/Output Summary (Last 24 hours) at 18 0802  Last data filed at 18 0525   Gross per 24 hour   Intake              550 ml   Output                0 ml   Net              550 ml        Physical Exam:    Gen:  Well-developed, well-nourished, in no acute distress  HEENT:  Pink conjunctivae, PERRL, hearing intact to voice, moist mucous membranes  Neck:  Supple, without masses, thyroid non-tender  Resp:  No accessory muscle use, clear breath sounds without wheezes rales or rhonchi  Card:  No murmurs, normal S1, S2 without thrills, bruits or peripheral edema  Abd:  Soft, non-tender, non-distended, normoactive bowel sounds are present, no palpable organomegaly and no detectable hernias  Lymph:  No cervical or inguinal adenopathy  Musc:  LT humerus amputation.    Skin:  No rashes or ulcers, skin turgor is good  Neuro:  Cranial nerves are grossly intact, no focal motor weakness, follows commands appropriately  Psych:  Good insight, oriented to person, place and time, alert  __________________________________________________________________  Medications Reviewed: (see below)  Medications:     Current Facility-Administered Medications   Medication Dose Route Frequency    [START ON 8/18/2018] Vancomycin - Please draw trough prior to dose due on 8/18 at 0600. Thanks! Other ONCE    piperacillin-tazobactam (ZOSYN) 3.375 g in 0.9% sodium chloride (MBP/ADV) 100 mL ADV  3.375 g IntraVENous Q8H    sodium chloride (NS) flush 5-10 mL  5-10 mL IntraVENous Q8H    sodium chloride (NS) flush 5-10 mL  5-10 mL IntraVENous PRN    acetaminophen (TYLENOL) tablet 650 mg  650 mg Oral Q4H PRN    HYDROcodone-acetaminophen (NORCO) 5-325 mg per tablet 1 Tab  1 Tab Oral Q4H PRN    diphenhydrAMINE (BENADRYL) capsule 25 mg  25 mg Oral Q4H PRN    naloxone (NARCAN) injection 0.4 mg  0.4 mg IntraVENous PRN    ondansetron (ZOFRAN) injection 4 mg  4 mg IntraVENous Q4H PRN    heparin (porcine) injection 5,000 Units  5,000 Units SubCUTAneous Q8H    vancomycin (VANCOCIN) 1,000 mg in 0.9% sodium chloride (MBP/ADV) 250 mL  1,000 mg IntraVENous Q12H        Lab Data Reviewed: (see below)  Lab Review:     Recent Labs      08/16/18   1408   WBC  4.8   HGB  12.2   HCT  37.8   PLT  296     Recent Labs      08/17/18   0514  08/16/18   1408   NA   --   143   K   --   3.5   CL   --   108   CO2   --   28   GLU   --   100   BUN  11  13   CREA  0.78  0.76   CA   --   8.4*   ALB   --   3.3*   TBILI   --   0.2   SGOT   --   14*   ALT   --   24     No results found for: GLUCPOC  No results for input(s): PH, PCO2, PO2, HCO3, FIO2 in the last 72 hours. No results for input(s): INR in the last 72 hours.     No lab exists for component: INREXT  All Micro Results     Procedure Component Value Units Date/Time    CULTURE, Valerio Cantrell [[id number]] Collected:  08/16/18 1741    Order Status:  Completed Specimen:  Wound from Arm Updated:  08/16/18 2223     Special Requests: LOOK FOR MRSA     GRAM STAIN NO WBC'S SEEN NO ORGANISMS SEEN        Culture result: PENDING    CULTURE, MRSA [[id number]]     Order Status:  Canceled Specimen:  Nares     CULTURE, BLOOD [[id number]] Collected:  08/16/18 1412    Order Status:  Completed Specimen:  Blood from Blood Updated:  08/16/18 1423          I have reviewed notes of prior 24hr. Other pertinent lab:       Total time spent with patient: 30 [address] discussed with: Patient, Nursing Staff and >50% of time spent in counseling and coordination of care    Discussed:  Care Plan    Prophylaxis:  SCD's    Disposition:  Home w/Family           ___________________________________________________    Attending Physician: Margot Grimm MD To get better and follow your care plan as instructed.

## 2022-03-26 NOTE — DISCHARGE NOTE PROVIDER - NSDCCPCAREPLAN_GEN_ALL_CORE_FT
PRINCIPAL DISCHARGE DIAGNOSIS  Diagnosis: Atrial fibrillation with RVR  Assessment and Plan of Treatment: Patient had a dual-chamber PPM 3/24/22. Patient understands no MRI x 6 weeks after procedure. Please take your medications as prescribed.  Continue to take your blood thinner as prescribed and follow with your physician to monitor your levels.  Low fat diet, reduce caffeine intake, and exercise at least 30 minutes daily. Follow up appointment date and time given to patient:  4/8 at 11:40 with Dr Chepe Jade. Resume eliquis on 3/27. Please take your medications as prescribed.  Continue to take your blood thinner as prescribed and follow with your physician to monitor your levels.  Low fat diet, reduce caffeine intake, and exercise at least 30 minutes daily.      SECONDARY DISCHARGE DIAGNOSES  Diagnosis: Hypothyroid  Assessment and Plan of Treatment: Resume synthroid    Diagnosis: Coronary artery disease  Assessment and Plan of Treatment: Continue Plavix, do not stop unless instructed by your physician. Patient reports she is no longer on aspirin, just plavix and eliquis. Continue low salt, fat, cholesterol and carbohydrate diet. Follow up with cardiologist and primary care physician's routine appointment.

## 2022-03-26 NOTE — DISCHARGE NOTE PROVIDER - HOSPITAL COURSE
89 YO woman with HTN, HLD, fibromyalgia, spinal stenosis on chronic pain meds, anxiety on prn valium, afib on eliquis, CVA, CAD s/p PCI, hypothyroid who presents from cardiologist office for rapid afib w/ intermittent bradycardia and PPM evaluation.    Atrial fibrillation with rapid ventricular response  Intermittent rapid AFIB  with Dante episodes likely responsible for pt's persistent dyspnea, palpitations and chronic chest discomfort. Concern for possible tachybrady syndrome  TTE 3/23/22 a Moderate concentric left ventricular hypertrophy, normal left ventricular systolic function, no segmental  wall motion abnormalities.  S/p dual-chamber PPM 3/24/22  Continue Eliquis for thromboembolic ppx given that patient has a [id number] score of 7  Resume eliquis on 3/27   Patient understands no MRI x 6 weeks after procedure   Please take your medications as prescribed.  Continue to take your blood thinner as prescribed and follow with your physician to monitor your levels.  Low fat diet, reduce caffeine intake, and exercise at least 30 minutes daily.  Follow up appointment date and time given to patient:  4/8 at 11:40 with Dr Chepe Jade MD    Coronary artery disease:   Patient with chronic chest pain reportedly. Patient reports she is no longer on aspirin, just plavix and eliquis. Trops stable. EKG with rapid AFIB  Continue metoprolol, plavix, eliquis, statin  DO not stop unless instructed by your physician. Continue low salt, fat, cholesterol and carbohydrate diet. Follow up with cardiologist and primary care physician's routine appointment.    Anxiety:   Patient takes valium qhs prn, will restart    Hypothyroid:   Resume synthroid.    Spinal stenosis:   Patient reports she is on chronic tylenol#4. Per medrec in paper chart pt also on percocets, will resume this. Pt with chronic pain and fibromyalgia.    3/26: Case discussed with Dr. Jason. Patient stable for discharge. Medications reviewed and sent to agreed upon pharmacy 89 YO woman with HTN, HLD, fibromyalgia, spinal stenosis on chronic pain meds, anxiety on prn valium, afib on eliquis, CVA, CAD s/p PCI, hypothyroid who presents from cardiologist office for rapid afib w/ intermittent bradycardia and PPM evaluation.    Atrial fibrillation with rapid ventricular response  Intermittent rapid AFIB  with Dante episodes likely responsible for pt's persistent dyspnea, palpitations and chronic chest discomfort. Concern for possible tachybrady syndrome  TTE 3/23/22 a Moderate concentric left ventricular hypertrophy, normal left ventricular systolic function, no segmental  wall motion abnormalities.  S/p dual-chamber PPM 3/24/22  Continue Eliquis for thromboembolic ppx given that patient has a [id number] score of 7  Resume eliquis on 3/27   Patient understands no MRI x 6 weeks after procedure   Please take your medications as prescribed.  Continue to take your blood thinner as prescribed and follow with your physician to monitor your levels.  Low fat diet, reduce caffeine intake, and exercise at least 30 minutes daily.  Follow up appointment date and time given to patient:  4/8 at 11:40 with Dr Chepe Jade MD    Coronary artery disease:   Patient with chronic chest pain reportedly. Patient reports she is no longer on aspirin, just plavix and eliquis. Trops stable. EKG with rapid AFIB  Continue metoprolol, plavix, eliquis, statin  DO not stop unless instructed by your physician. Continue low salt, fat, cholesterol and carbohydrate diet. Follow up with cardiologist and primary care physician's routine appointment.    Anxiety:   Patient takes valium qhs prn, will restart    Hypothyroid:   Resume synthroid.    Spinal stenosis:   Patient reports she is on chronic tylenol#4. Per medrec in paper chart pt also on percocets, will resume this. Pt with chronic pain and fibromyalgia.    3/26: Case discussed with Dr. Jason. Patient stable for discharge. Medications reviewed and sent to agreed upon pharmacy

## 2022-03-26 NOTE — DISCHARGE NOTE NURSING/CASE MANAGEMENT/SOCIAL WORK - NSDCPEFALRISK_GEN_ALL_CORE
For information on Fall & Injury Prevention, visit: [email or web address] OR  [email or web address] OR  https://www.cdc.gov/steadi/patient.html

## 2022-03-26 NOTE — DISCHARGE NOTE NURSING/CASE MANAGEMENT/SOCIAL WORK - PATIENT PORTAL LINK FT
You can access the FollowMyHealth Patient Portal offered by [facility] by registering at the following website: [email or web address]. By joining [facility]’s FollowMyHealth portal, you will also be able to view your health information using other applications (apps) compatible with our system.

## 2022-04-11 ENCOUNTER — APPOINTMENT (OUTPATIENT)
Dept: ELECTROPHYSIOLOGY | Facility: CLINIC | Age: 87
End: 2022-04-11
Payer: MEDICARE

## 2022-04-11 DIAGNOSIS — Z86.73 PERSONAL HISTORY OF TRANSIENT ISCHEMIC ATTACK (TIA), AND CEREBRAL INFARCTION W/OUT RESIDUAL DEFICITS: ICD-10-CM

## 2022-04-11 DIAGNOSIS — Z87.39 PERSONAL HISTORY OF OTHER DISEASES OF THE MUSCULOSKELETAL SYSTEM AND CONNECTIVE TISSUE: ICD-10-CM

## 2022-04-11 DIAGNOSIS — E03.9 HYPOTHYROIDISM, UNSPECIFIED: ICD-10-CM

## 2022-04-11 PROCEDURE — 99024 POSTOP FOLLOW-UP VISIT: CPT

## 2022-04-11 RX ORDER — DULOXETINE HYDROCHLORIDE 60 MG/1
60 CAPSULE, DELAYED RELEASE PELLETS ORAL
Qty: 90 | Refills: 0 | Status: DISCONTINUED | COMMUNITY
Start: 2018-01-19 | End: 2022-04-11

## 2022-04-11 RX ORDER — ATORVASTATIN CALCIUM 20 MG/1
20 TABLET, FILM COATED ORAL
Refills: 0 | Status: ACTIVE | COMMUNITY

## 2022-04-11 RX ORDER — LOSARTAN POTASSIUM 100 MG/1
100 TABLET, FILM COATED ORAL
Refills: 0 | Status: DISCONTINUED | COMMUNITY
End: 2022-04-11

## 2022-04-11 RX ORDER — ALBUTEROL SULFATE 90 UG/1
108 (90 BASE) AEROSOL, METERED RESPIRATORY (INHALATION)
Refills: 0 | Status: ACTIVE | COMMUNITY

## 2022-04-11 RX ORDER — OXYCODONE 5 MG/1
5 TABLET ORAL
Refills: 0 | Status: DISCONTINUED | COMMUNITY
End: 2022-04-11

## 2022-04-11 RX ORDER — ASPIRIN 81 MG
81 TABLET, DELAYED RELEASE (ENTERIC COATED) ORAL
Refills: 0 | Status: DISCONTINUED | COMMUNITY
End: 2022-04-11

## 2022-04-13 PROBLEM — Z87.39 HISTORY OF FIBROMYALGIA: Status: RESOLVED | Noted: 2022-04-13 | Resolved: 2022-04-13

## 2022-04-13 PROBLEM — Z86.73 HISTORY OF CEREBROVASCULAR ACCIDENT: Status: RESOLVED | Noted: 2022-04-13 | Resolved: 2022-04-13

## 2022-04-13 PROBLEM — E03.9 HYPOTHYROIDISM: Status: ACTIVE | Noted: 2022-04-13

## 2022-05-13 ENCOUNTER — APPOINTMENT (OUTPATIENT)
Dept: ELECTROPHYSIOLOGY | Facility: CLINIC | Age: 87
End: 2022-05-13
Payer: MEDICARE

## 2022-05-13 ENCOUNTER — INPATIENT (INPATIENT)
Facility: HOSPITAL | Age: 87
LOS: 6 days | Discharge: HOME | End: 2022-05-20
Attending: INTERNAL MEDICINE | Admitting: INTERNAL MEDICINE
Payer: MEDICARE

## 2022-05-13 VITALS
HEIGHT: 64 IN | SYSTOLIC BLOOD PRESSURE: 172 MMHG | RESPIRATION RATE: 16 BRPM | TEMPERATURE: 98 F | HEART RATE: 83 BPM | DIASTOLIC BLOOD PRESSURE: 94 MMHG | OXYGEN SATURATION: 99 %

## 2022-05-13 DIAGNOSIS — E03.9 HYPOTHYROIDISM, UNSPECIFIED: ICD-10-CM

## 2022-05-13 DIAGNOSIS — M48.02 SPINAL STENOSIS, CERVICAL REGION: ICD-10-CM

## 2022-05-13 DIAGNOSIS — Z78.9 OTHER SPECIFIED HEALTH STATUS: ICD-10-CM

## 2022-05-13 DIAGNOSIS — I25.10 ATHEROSCLEROTIC HEART DISEASE OF NATIVE CORONARY ARTERY WITHOUT ANGINA PECTORIS: ICD-10-CM

## 2022-05-13 DIAGNOSIS — T82.7XXA INFECTION AND INFLAMMATORY REACTION DUE TO OTHER CARDIAC AND VASCULAR DEVICES, IMPLANTS AND GRAFTS, INITIAL ENCOUNTER: ICD-10-CM

## 2022-05-13 DIAGNOSIS — Z29.9 ENCOUNTER FOR PROPHYLACTIC MEASURES, UNSPECIFIED: ICD-10-CM

## 2022-05-13 DIAGNOSIS — F41.9 ANXIETY DISORDER, UNSPECIFIED: ICD-10-CM

## 2022-05-13 LAB
ALBUMIN SERPL ELPH-MCNC: 4.8 G/DL — SIGNIFICANT CHANGE UP (ref 3.3–5)
ALP SERPL-CCNC: 87 U/L — SIGNIFICANT CHANGE UP (ref 40–120)
ALT FLD-CCNC: 16 U/L — SIGNIFICANT CHANGE UP (ref 4–33)
ANION GAP SERPL CALC-SCNC: 14 MMOL/L — SIGNIFICANT CHANGE UP (ref 7–14)
APTT BLD: 38.6 SEC — HIGH (ref 27–36.3)
AST SERPL-CCNC: 26 U/L — SIGNIFICANT CHANGE UP (ref 4–32)
BASE EXCESS BLDV CALC-SCNC: 0.9 MMOL/L — SIGNIFICANT CHANGE UP (ref -2–3)
BASOPHILS # BLD AUTO: 0.01 K/UL — SIGNIFICANT CHANGE UP (ref 0–0.2)
BASOPHILS NFR BLD AUTO: 0.1 % — SIGNIFICANT CHANGE UP (ref 0–2)
BILIRUB SERPL-MCNC: 1.3 MG/DL — HIGH (ref 0.2–1.2)
BLD GP AB SCN SERPL QL: NEGATIVE — SIGNIFICANT CHANGE UP
BLOOD GAS VENOUS COMPREHENSIVE RESULT: SIGNIFICANT CHANGE UP
BUN SERPL-MCNC: 16 MG/DL — SIGNIFICANT CHANGE UP (ref 7–23)
CALCIUM SERPL-MCNC: 9.8 MG/DL — SIGNIFICANT CHANGE UP (ref 8.4–10.5)
CHLORIDE BLDV-SCNC: 100 MMOL/L — SIGNIFICANT CHANGE UP (ref 96–108)
CHLORIDE SERPL-SCNC: 97 MMOL/L — LOW (ref 98–107)
CO2 BLDV-SCNC: 25.2 MMOL/L — SIGNIFICANT CHANGE UP (ref 22–26)
CO2 SERPL-SCNC: 20 MMOL/L — LOW (ref 22–31)
CREAT SERPL-MCNC: 0.85 MG/DL — SIGNIFICANT CHANGE UP (ref 0.5–1.3)
EGFR: 63 ML/MIN/1.73M2 — SIGNIFICANT CHANGE UP
EOSINOPHIL # BLD AUTO: 0.04 K/UL — SIGNIFICANT CHANGE UP (ref 0–0.5)
EOSINOPHIL NFR BLD AUTO: 0.6 % — SIGNIFICANT CHANGE UP (ref 0–6)
GAS PNL BLDV: 128 MMOL/L — LOW (ref 136–145)
GLUCOSE BLDV-MCNC: 111 MG/DL — HIGH (ref 70–99)
GLUCOSE SERPL-MCNC: 110 MG/DL — HIGH (ref 70–99)
HCO3 BLDV-SCNC: 24 MMOL/L — SIGNIFICANT CHANGE UP (ref 22–29)
HCT VFR BLD CALC: 42.3 % — SIGNIFICANT CHANGE UP (ref 34.5–45)
HCT VFR BLDA CALC: 46 % — SIGNIFICANT CHANGE UP (ref 34.5–46.5)
HGB BLD CALC-MCNC: 15.4 G/DL — SIGNIFICANT CHANGE UP (ref 11.5–15.5)
HGB BLD-MCNC: 14.8 G/DL — SIGNIFICANT CHANGE UP (ref 11.5–15.5)
IANC: 4.63 K/UL — SIGNIFICANT CHANGE UP (ref 1.8–7.4)
IMM GRANULOCYTES NFR BLD AUTO: 0.4 % — SIGNIFICANT CHANGE UP (ref 0–1.5)
INR BLD: 1.24 RATIO — HIGH (ref 0.88–1.16)
LACTATE BLDV-MCNC: 1.1 MMOL/L — SIGNIFICANT CHANGE UP (ref 0.5–2)
LYMPHOCYTES # BLD AUTO: 1.48 K/UL — SIGNIFICANT CHANGE UP (ref 1–3.3)
LYMPHOCYTES # BLD AUTO: 21.9 % — SIGNIFICANT CHANGE UP (ref 13–44)
MAGNESIUM SERPL-MCNC: 1.9 MG/DL — SIGNIFICANT CHANGE UP (ref 1.6–2.6)
MCHC RBC-ENTMCNC: 30.3 PG — SIGNIFICANT CHANGE UP (ref 27–34)
MCHC RBC-ENTMCNC: 35 GM/DL — SIGNIFICANT CHANGE UP (ref 32–36)
MCV RBC AUTO: 86.5 FL — SIGNIFICANT CHANGE UP (ref 80–100)
MONOCYTES # BLD AUTO: 0.58 K/UL — SIGNIFICANT CHANGE UP (ref 0–0.9)
MONOCYTES NFR BLD AUTO: 8.6 % — SIGNIFICANT CHANGE UP (ref 2–14)
NEUTROPHILS # BLD AUTO: 4.63 K/UL — SIGNIFICANT CHANGE UP (ref 1.8–7.4)
NEUTROPHILS NFR BLD AUTO: 68.4 % — SIGNIFICANT CHANGE UP (ref 43–77)
NRBC # BLD: 0 /100 WBCS — SIGNIFICANT CHANGE UP
NRBC # FLD: 0 K/UL — SIGNIFICANT CHANGE UP
PCO2 BLDV: 34 MMHG — LOW (ref 39–42)
PH BLDV: 7.46 — HIGH (ref 7.32–7.43)
PLATELET # BLD AUTO: 194 K/UL — SIGNIFICANT CHANGE UP (ref 150–400)
PO2 BLDV: 79 MMHG — SIGNIFICANT CHANGE UP
POTASSIUM BLDV-SCNC: 5.8 MMOL/L — HIGH (ref 3.5–5.1)
POTASSIUM SERPL-MCNC: 4.1 MMOL/L — SIGNIFICANT CHANGE UP (ref 3.5–5.3)
POTASSIUM SERPL-SCNC: 4.1 MMOL/L — SIGNIFICANT CHANGE UP (ref 3.5–5.3)
PROT SERPL-MCNC: 7.2 G/DL — SIGNIFICANT CHANGE UP (ref 6–8.3)
PROTHROM AB SERPL-ACNC: 14.4 SEC — HIGH (ref 10.5–13.4)
RBC # BLD: 4.89 M/UL — SIGNIFICANT CHANGE UP (ref 3.8–5.2)
RBC # FLD: 13.3 % — SIGNIFICANT CHANGE UP (ref 10.3–14.5)
RH IG SCN BLD-IMP: POSITIVE — SIGNIFICANT CHANGE UP
SAO2 % BLDV: 96.1 % — SIGNIFICANT CHANGE UP
SODIUM SERPL-SCNC: 131 MMOL/L — LOW (ref 135–145)
WBC # BLD: 6.77 K/UL — SIGNIFICANT CHANGE UP (ref 3.8–10.5)
WBC # FLD AUTO: 6.77 K/UL — SIGNIFICANT CHANGE UP (ref 3.8–10.5)

## 2022-05-13 PROCEDURE — 99285 EMERGENCY DEPT VISIT HI MDM: CPT

## 2022-05-13 PROCEDURE — 93280 PM DEVICE PROGR EVAL DUAL: CPT

## 2022-05-13 PROCEDURE — 99223 1ST HOSP IP/OBS HIGH 75: CPT

## 2022-05-13 PROCEDURE — 99222 1ST HOSP IP/OBS MODERATE 55: CPT | Mod: 25

## 2022-05-13 PROCEDURE — 71045 X-RAY EXAM CHEST 1 VIEW: CPT | Mod: 26

## 2022-05-13 RX ORDER — CLOPIDOGREL BISULFATE 75 MG/1
1 TABLET, FILM COATED ORAL
Qty: 0 | Refills: 0 | DISCHARGE

## 2022-05-13 RX ORDER — VANCOMYCIN HCL 1 G
1000 VIAL (EA) INTRAVENOUS ONCE
Refills: 0 | Status: COMPLETED | OUTPATIENT
Start: 2022-05-13 | End: 2022-05-13

## 2022-05-13 RX ORDER — OXYCODONE HYDROCHLORIDE 5 MG/1
5 TABLET ORAL EVERY 6 HOURS
Refills: 0 | Status: DISCONTINUED | OUTPATIENT
Start: 2022-05-13 | End: 2022-05-19

## 2022-05-13 RX ORDER — VANCOMYCIN HCL 1 G
1000 VIAL (EA) INTRAVENOUS EVERY 12 HOURS
Refills: 0 | Status: DISCONTINUED | OUTPATIENT
Start: 2022-05-14 | End: 2022-05-14

## 2022-05-13 RX ORDER — ALBUTEROL 90 UG/1
2 AEROSOL, METERED ORAL
Qty: 0 | Refills: 0 | DISCHARGE

## 2022-05-13 RX ORDER — LEVOTHYROXINE SODIUM 125 MCG
112 TABLET ORAL DAILY
Refills: 0 | Status: DISCONTINUED | OUTPATIENT
Start: 2022-05-13 | End: 2022-05-20

## 2022-05-13 RX ORDER — OXYCODONE AND ACETAMINOPHEN 5; 325 MG/1; MG/1
1 TABLET ORAL EVERY 6 HOURS
Refills: 0 | Status: DISCONTINUED | OUTPATIENT
Start: 2022-05-13 | End: 2022-05-13

## 2022-05-13 RX ORDER — CEFEPIME 1 G/1
1000 INJECTION, POWDER, FOR SOLUTION INTRAMUSCULAR; INTRAVENOUS ONCE
Refills: 0 | Status: COMPLETED | OUTPATIENT
Start: 2022-05-13 | End: 2022-05-13

## 2022-05-13 RX ORDER — OXYCODONE AND ACETAMINOPHEN 5; 325 MG/1; MG/1
1 TABLET ORAL ONCE
Refills: 0 | Status: DISCONTINUED | OUTPATIENT
Start: 2022-05-13 | End: 2022-05-14

## 2022-05-13 RX ORDER — APIXABAN 2.5 MG/1
2.5 TABLET, FILM COATED ORAL
Refills: 0 | Status: COMPLETED | OUTPATIENT
Start: 2022-05-13 | End: 2022-05-15

## 2022-05-13 RX ORDER — DIAZEPAM 5 MG
2.5 TABLET ORAL
Refills: 0 | Status: DISCONTINUED | OUTPATIENT
Start: 2022-05-13 | End: 2022-05-14

## 2022-05-13 RX ORDER — METOPROLOL TARTRATE 50 MG
100 TABLET ORAL
Refills: 0 | Status: DISCONTINUED | OUTPATIENT
Start: 2022-05-13 | End: 2022-05-20

## 2022-05-13 RX ORDER — LEVOTHYROXINE SODIUM 125 MCG
1 TABLET ORAL
Qty: 0 | Refills: 0 | DISCHARGE

## 2022-05-13 RX ORDER — CEFEPIME 1 G/1
2000 INJECTION, POWDER, FOR SOLUTION INTRAMUSCULAR; INTRAVENOUS
Refills: 0 | Status: DISCONTINUED | OUTPATIENT
Start: 2022-05-14 | End: 2022-05-14

## 2022-05-13 RX ORDER — ACETAMINOPHEN 500 MG
650 TABLET ORAL EVERY 6 HOURS
Refills: 0 | Status: DISCONTINUED | OUTPATIENT
Start: 2022-05-13 | End: 2022-05-20

## 2022-05-13 RX ORDER — LANOLIN ALCOHOL/MO/W.PET/CERES
3 CREAM (GRAM) TOPICAL AT BEDTIME
Refills: 0 | Status: DISCONTINUED | OUTPATIENT
Start: 2022-05-13 | End: 2022-05-20

## 2022-05-13 RX ORDER — ATORVASTATIN CALCIUM 80 MG/1
1 TABLET, FILM COATED ORAL
Qty: 0 | Refills: 0 | DISCHARGE

## 2022-05-13 RX ORDER — PANTOPRAZOLE SODIUM 20 MG/1
1 TABLET, DELAYED RELEASE ORAL
Qty: 0 | Refills: 0 | DISCHARGE

## 2022-05-13 RX ORDER — PANTOPRAZOLE SODIUM 20 MG/1
40 TABLET, DELAYED RELEASE ORAL
Refills: 0 | Status: DISCONTINUED | OUTPATIENT
Start: 2022-05-13 | End: 2022-05-20

## 2022-05-13 RX ORDER — DIAZEPAM 5 MG
1 TABLET ORAL
Qty: 0 | Refills: 0 | DISCHARGE

## 2022-05-13 RX ADMIN — Medication 250 MILLIGRAM(S): at 21:15

## 2022-05-13 RX ADMIN — CEFEPIME 100 MILLIGRAM(S): 1 INJECTION, POWDER, FOR SOLUTION INTRAMUSCULAR; INTRAVENOUS at 20:44

## 2022-05-13 NOTE — CONSULT NOTE ADULT - SUBJECTIVE AND OBJECTIVE BOX
Source: patient and Chart    HPI:  Patient is a 89y old  Female with a PMH of HTN, HLD, fibromyalgia, spinal stenosis on chronic pain medications, Afib on Eliquis,  s/p PPM on 3/24 Afib with TBS, CVA, CAD s/p WAGNER to LAD in 12/21 at [facility], hypothyroidism, anxiety, sent in from EP clinic for PPM site infection. Patient went to EP clinic today for wound check and noted to have erythema and discharge from pacemaker site. Of note, she was treated with PO antibiotic Cefadroxil on 4/11 for ppm site ppx. Pt. denies trauma to the site, pain, CP, palpitation, SOB, fever or chills.       PAST MEDICAL & SURGICAL HISTORY:  Benign Essential Hypertension      Hypothyroidism      Depression      Fibromyalgia      CVA (cerebral vascular accident)      Spinal stenosis      Essential hypertension      H/O: Hysterectomy  40 years ago      History of Tonsillectomy  childhood      S/P Laminectomy  Lumbar Laminectomy 2001      Cataract  2008, 2009            MEDICATIONS  (STANDING):  cefepime   IVPB 1000 milliGRAM(s) IV Intermittent once  vancomycin  IVPB. 1000 milliGRAM(s) IV Intermittent once    MEDICATIONS  (PRN):      FAMILY HISTORY:  No pertinent family history in first degree relatives        SOCIAL HISTORY:    LIVING SITUATION:  CIGARETTES: Denied  ALCOHOL: denied   ILLICIT DRUG USES: denied    REVIEW OF SYSTEMS:  CONSTITUTIONAL: No fever, weight loss, chills, shakes, or fatigue  EYES: No eye pain, visual disturbances, or discharge  ENMT:  No difficulty hearing, tinnitus, vertigo; No sinus or throat pain  NECK: No pain or stiffness  RESPIRATORY: No cough, wheezing, hemoptysis, or shortness of breath  CARDIOVASCULAR: No chest pain, dyspnea, palpitations, dizziness, syncope, paroxysmal nocturnal dyspnea, orthopnea, or arm or leg swelling  GASTROINTESTINAL: No abdominal  or epigastric pain, nausea, vomiting, hematemesis, diarrhea, constipation, melena or bright red blood.  GENITOURINARY: No dysuria, nocturia, hematuria, or urinary incontinence  NEUROLOGICAL: No headaches, memory loss, slurred speech, limb weakness, loss of strength, numbness, or tremors  MUSCULOSKELETAL: No joint pain or swelling, muscle, back, or extremity pain  PSYCHIATRIC: No depression, anxiety, or difficulty sleeping        Vital Signs Last 24 Hrs  T(C): 36.8 (13 May 2022 17:38), Max: 36.8 (13 May 2022 17:38)  T(F): 98.2 (13 May 2022 17:38), Max: 98.2 (13 May 2022 17:38)  HR: 83 (13 May 2022 17:38) (83 - 83)  BP: 172/94 (13 May 2022 17:38) (172/94 - 172/94)  BP(mean): --  RR: 16 (13 May 2022 17:38) (16 - 16)  SpO2: 99% (13 May 2022 17:38) (99% - 99%)    PHYSICAL EXAM:  GENERAL: Well appearing, speaking in full sentence, in NAD  HEART: irregular,  systolic murmurs, rubs, or gallops appreciated . + L chest wall PPM site with small amount saurognathous discharge, erythema, minimal tenderness, No hematoma   PULMONARY:CTABL, normal respiratory effort.  No rales, wheezing, or rhonchi appreciated bilaterally  ABDOMEN: Bowel sounds present, soft, NDNT  EXTREMITIES:  Warm, well -perfused, no pedal edema, distal pulses present  NEUROLOGICAL:AOx3 ,  motor function grossly  intact    INTERPRETATION OF TELEMETRY: PT. is not on Tele     ECG: NO ekg AVAIALBE     I&O's Detail      LABS:                  BNP  I&O's Detail    Daily Height in cm: 162.56 (13 May 2022 17:38)    Daily     RADIOLOGY & ADDITIONAL STUDIES:         Source: patient and Chart    HPI:  Patient is a 89y old  Female with a PMH of HTN, HLD, fibromyalgia, spinal stenosis on chronic pain medications, Afib on Eliquis,  s/p [employer] PPM on 3/24 Afib with TBS, CVA, CAD s/p WAGNER to LAD in 12/21 at [facility], hypothyroidism, anxiety, sent in from EP clinic for PPM site infection. Patient went to EP clinic today for wound check and noted to have erythema and discharge from pacemaker site. Of note, she was treated with PO antibiotic Cefadroxil on 4/11 for ppm site ppx. Pt. denies trauma to the site, pain, CP, palpitation, SOB, fever or chills.       PAST MEDICAL & SURGICAL HISTORY:  Benign Essential Hypertension      Hypothyroidism      Depression      Fibromyalgia      CVA (cerebral vascular accident)      Spinal stenosis      Essential hypertension      H/O: Hysterectomy  40 years ago      History of Tonsillectomy  childhood      S/P Laminectomy  Lumbar Laminectomy 2001      Cataract  2008, 2009            MEDICATIONS  (STANDING):  cefepime   IVPB 1000 milliGRAM(s) IV Intermittent once  vancomycin  IVPB. 1000 milliGRAM(s) IV Intermittent once    MEDICATIONS  (PRN):      FAMILY HISTORY:  No pertinent family history in first degree relatives        SOCIAL HISTORY:    LIVING SITUATION:  CIGARETTES: Denied  ALCOHOL: denied   ILLICIT DRUG USES: denied    REVIEW OF SYSTEMS:  CONSTITUTIONAL: No fever, weight loss, chills, shakes, or fatigue  EYES: No eye pain, visual disturbances, or discharge  ENMT:  No difficulty hearing, tinnitus, vertigo; No sinus or throat pain  NECK: No pain or stiffness  RESPIRATORY: No cough, wheezing, hemoptysis, or shortness of breath  CARDIOVASCULAR: No chest pain, dyspnea, palpitations, dizziness, syncope, paroxysmal nocturnal dyspnea, orthopnea, or arm or leg swelling  GASTROINTESTINAL: No abdominal  or epigastric pain, nausea, vomiting, hematemesis, diarrhea, constipation, melena or bright red blood.  GENITOURINARY: No dysuria, nocturia, hematuria, or urinary incontinence  NEUROLOGICAL: No headaches, memory loss, slurred speech, limb weakness, loss of strength, numbness, or tremors  MUSCULOSKELETAL: No joint pain or swelling, muscle, back, or extremity pain  PSYCHIATRIC: No depression, anxiety, or difficulty sleeping        Vital Signs Last 24 Hrs  T(C): 36.8 (13 May 2022 17:38), Max: 36.8 (13 May 2022 17:38)  T(F): 98.2 (13 May 2022 17:38), Max: 98.2 (13 May 2022 17:38)  HR: 83 (13 May 2022 17:38) (83 - 83)  BP: 172/94 (13 May 2022 17:38) (172/94 - 172/94)  BP(mean): --  RR: 16 (13 May 2022 17:38) (16 - 16)  SpO2: 99% (13 May 2022 17:38) (99% - 99%)    PHYSICAL EXAM:  GENERAL: Well appearing, speaking in full sentence, in NAD  HEART: irregular,  systolic murmurs, rubs, or gallops appreciated . + L chest wall PPM site with small amount saurognathous discharge, erythema, minimal tenderness, No hematoma   PULMONARY:CTABL, normal respiratory effort.  No rales, wheezing, or rhonchi appreciated bilaterally  ABDOMEN: Bowel sounds present, soft, NDNT  EXTREMITIES:  Warm, well -perfused, no pedal edema, distal pulses present  NEUROLOGICAL:AOx3 ,  motor function grossly  intact    INTERPRETATION OF TELEMETRY: PT. is not on Tele     ECG: NO ekg AVAIALBE     I&O's Detail      LABS:                  BNP  I&O's Detail    Daily Height in cm: 162.56 (13 May 2022 17:38)    Daily     RADIOLOGY & ADDITIONAL STUDIES:

## 2022-05-13 NOTE — ED PROVIDER NOTE - ATTENDING CONTRIBUTION TO CARE
Brief HPI:  89 yo F PMH PPM placement March 2022, HTN, HLD, fibromyalgia, spinal stenosis on chronic pain meds, anxiety on prn valium, afib on eliquis, CVA, CAD s/p PCI sent in by Dr. Jade for PPM infection.     Vitals:   Reviewed    Exam:    GEN:  Non-toxic appearing, non-distressed, speaking full sentences, non-diaphoretic, AAOx3  HEENT:  NCAT, neck supple, EOMI, PERRLA, sclera anicteric, no conjunctival pallor or injection, no stridor, normal voice, no tonsillar exudate, uvula midline  CV:  regular rhythm and rate, s1/s2 audible, no murmurs, rubs or gallops, peripheral pulses 2+ and symmetric  PULM:  non-labored respirations, lungs clear to auscultation bilaterally, no wheezes, crackles or rales  ABD:  non distended, non-tender, no rebound, no guarding, negative Barker's sign, bowel sounds normal, no cvat  MSK:  no gross deformity, non-tender extremities and joints, range of motion grossly normal appearing, no extremity edema, extremities warm and well perfused   NEURO:  AAOx3, CN II-XII intact, motor 5/5 in upper and lower extremities bilaterally, sensation grossly intact in extremities and trunk  SKIN: erythema and edema noted at PPM site, purulent fluid draining upon expression of wound.    A/P:  89 yo F PMH PPM placement March 2022, HTN, HLD, fibromyalgia, spinal stenosis on chronic pain meds, anxiety on prn valium, afib on eliquis, CVA, CAD s/p PCI sent in by Dr. Jade for PPM infection.   VSS AF.  EP aware of patient, NP obtained cultures.  Plan for labs, cultures, abx, admission.

## 2022-05-13 NOTE — H&P ADULT - PROBLEM SELECTOR PLAN 2
- Complains of severe back/neck pain with R sided paresthesias. States pain and paresthesias are positional.  - Takes Tylenol with codeine #4 for pain control   - Patient states she will follow up with outpatient neurologist once out of hospital - Complains of severe back/neck pain with R sided paresthesias. States pain and paresthesias are positional.  - Takes Tylenol with codeine #4  and percocet for pain control   - Will Tylenol 650 mg q 4 hours for mild pain and oxycodone 5 mg q 6 hours for severe pain   - Patient states she will follow up with outpatient neurologist once out of hospital - Complains of severe back/neck pain with R sided paresthesias. States pain and paresthesias are positional.  - Does not admit to inability to urinate or have BM.   - Takes Tylenol with codeine #4  and percocet for pain control   - Will Tylenol 650 mg q 4 hours for mild pain and oxycodone 5 mg q 6 hours for severe pain   - Patient states she will follow up with outpatient neurologist once out of hospital

## 2022-05-13 NOTE — H&P ADULT - NSHPREVIEWOFSYSTEMS_GEN_ALL_CORE
Constitutional Symptoms: No weakness, fevers, chills, weight loss  Eyes: No visual changes, headache, eye pain, double vision  Ears, Nose, Mouth, Throat: No runny nose, sinus pain, ear pain, tinnitus, sore throat, dysphagia, odynophagia  Cardiovascular: No chest pain, palpitations, edema  Respiratory: No cough, wheezing, hemoptysis, shortness of breath  Gastrointestinal: No abdominal pain, dysphagia, anorexia, nausea/vomiting, diarrhea,  hematemesis, BRBPR, melena, ADMITS constipation   Genitourinary: No dysuria, hematuria, Admits urinary frequency   Musculoskeletal: No joint pain, joint swelling, decreased ROM, + Back and neck pain, + Numbness/tingling of the Right upper extremity and lower extremity   Skin: No pruritus, rashes, lesions + wound of the L chest at PPM site, +erythema   Neurologic:  No seizures, headache, limb weakness, + numbness/tingling   Psychiatric: No depression, anxiety, difficulty concentrating, anhedonia, lack of energy  Endocrine: No heat/cold intolerance, mood swings, sweats, polydipsia, polyuria  Hematologic/lymphatic: No purpura, petechia, prolonged or excessive bleeding after dental extraction / injury, use of anticoagulant and antiplatelet drugs (including aspirin)  Allergic/Immunologic: No anaphylaxis, allergic response to materials, foods, animals    Positives and pertinent negatives noted and all other systems negative.

## 2022-05-13 NOTE — ED ADULT NURSE NOTE - OBJECTIVE STATEMENT
Jaskaran RN: Pt received to rm 21 presents with infected pacemaker site. Pt a&ox3, ambulatory with assistance, skin intact with exception of redness noted to PPM site. Pt reports pacemaker placed in 3/24 and advised to come in to ER for repair surgery on monday. Report endorsed to primary RN, Lester EP currently at bedside.

## 2022-05-13 NOTE — H&P ADULT - NSHPLABSRESULTS_GEN_ALL_CORE
Labs:                        14.8   6.77  )-----------( 194      ( 13 May 2022 20:48 )             42.3     05-13    131<L>  |  97<L>  |  16  ----------------------------<  110<H>  4.1   |  20<L>  |  0.85    Ca    9.8      13 May 2022 20:48  Mg     1.90     05-13    TPro  7.2  /  Alb  4.8  /  TBili  1.3<H>  /  DBili  x   /  AST  26  /  ALT  16  /  AlkPhos  87  05-13    PT/INR: PT: 14.4 sec | INR: 1.24 ratio (05-13-22 @ 20:48)  PTT: 38.6 sec (05-13-22 @ 20:48)    Blood Gas Venous (05-13-22 @ 22:55:):  pH: 7.46 | HCO3: 24 | pCO2: 34 | pO2: 79 | Lactate: 1.1    COVID-19 PCR:  NotDetec (03-22-22 @ 17:14)    < from: Xray Chest 1 View- PORTABLE-Urgent (Xray Chest 1 View- PORTABLE-Urgent .) (05.13.22 @ 20:12) >  IMPRESSION:  Left chest wall pacemaker with leads overlying right atrium and right   ventricle.  The lungs are clear.  The heart size cannot accurately be assessed on this projection. Mitral   annular calcifications.  Degenerative osseous changes.  < end of copied text >

## 2022-05-13 NOTE — H&P ADULT - ASSESSMENT
89 y/o F PMH PPM placement (March 2022), HTN, HLD, fibromyalgia, CLL (in remission), spinal stenosis, hypothyroidism, cataracts, hysterectomy, anxiety/depression, CAD presents to the ED s/p appointment with Dr. Jade with PPM site erythema and drainage admitted to hospital for IV abx and PPM removal.

## 2022-05-13 NOTE — ED ADULT NURSE NOTE - NSIMPLEMENTINTERV_GEN_ALL_ED
Implemented All Fall Risk Interventions:  [place] to call system. Call bell, personal items and telephone within reach. Instruct patient to call for assistance. Room bathroom lighting operational. Non-slip footwear when patient is off stretcher. Physically safe environment: no spills, clutter or unnecessary equipment. Stretcher in lowest position, wheels locked, appropriate side rails in place. Provide visual cue, wrist band, yellow gown, etc. Monitor gait and stability. Monitor for mental status changes and reorient to person, place, and time. Review medications for side effects contributing to fall risk. Reinforce activity limits and safety measures with patient and family.

## 2022-05-13 NOTE — CONSULT NOTE ADULT - ASSESSMENT
Patient is a 89y old  Female with a PMH of HTN, HLD, fibromyalgia, spinal stenosis on chronic pain medications, Afib on Eliquis,  s/p PPM on 3/24 Afib with TBS, CVA, CAD s/p WAGNER to LAD in 12/21 at [facility], hypothyroidism, anxiety, sent in from EP clinic for PPM site infection. Patient went to EP clinic today for wound check and noted to have erythema and discharge from pacemaker site. Of note, she was treated with PO antibiotic Cefadroxil on 4/11 for ppm site ppx. Pt. denies trauma to the site, pain, CP, palpitation, SOB, fever or chills.       PPM site infection-- Will have PPM extraction and reimplantation after clear from systemic infection. The process of the procedures along with the risks and benefits for each procedure were explained in detail which included but not limited to bleeding requiring transfusion, infection, stroke, plural effusion, esophageal injury, pericardial effusion, cardiac tamponade requiring chest tube, intubation, and death. Patient expressed understanding and all questions were answered. Patient is agreeable and consented.      RECOMMENDATIONS:  - tentative schedule on 5/16 Monday for PPM extraction with implantation ( after pt. is clear with no systemic infection)  - NPO after Sunday MN, AM labs and T&S , COVID swab  - Blood Ctx and wound Ctx X 2 sent, broad spectrum ABX Vanco and Cefepime started, ID consult appreciated  - Please place pt. on continuous telemetric monitoring  - Monitor electrolytes and replete K to 4 and Mg to 2  - Continue Eliquis for thromboembolic ppx  given that patient has a [id number] score of 7 and hold Kory 5/15 PM dose and 5/16 AM dose for anticipated PPM extraction  - Continue care per primary team    Patient to be staffed with attending. Please await attending addendum   Patient is a 89y old  Female with a PMH of HTN, HLD, fibromyalgia, spinal stenosis on chronic pain medications, Afib on Eliquis,  s/p Medtronic PPM on 3/24 Afib with TBS, CVA, CAD s/p WAGNER to LAD in 12/21 at [facility], hypothyroidism, anxiety, sent in from EP clinic for PPM site infection. Patient went to EP clinic today for wound check and noted to have erythema and discharge from pacemaker site. Of note, she was treated with PO antibiotic Cefadroxil on 4/11 for ppm site ppx. Pt. denies trauma to the site, pain, CP, palpitation, SOB, fever or chills.       PPM site infection-- Will have PPM extraction and reimplantation after clear from systemic infection. The process of the procedures along with the risks and benefits for each procedure were explained in detail which included but not limited to bleeding requiring transfusion, infection, stroke, plural effusion, esophageal injury, pericardial effusion, cardiac tamponade requiring chest tube, intubation, and death. Patient expressed understanding and all questions were answered. Patient is agreeable and consented.      RECOMMENDATIONS:  - tentative schedule on 5/16 Monday for PPM extraction with implantation ( after pt. is clear with no systemic infection)  - NPO after Sunday MN, AM labs and T&S , COVID swab  - Blood Ctx and wound Ctx X 2 sent, broad spectrum ABX Vanco and Cefepime started, ID consult appreciated  - Please place pt. on continuous telemetric monitoring  - Monitor electrolytes and replete K to 4 and Mg to 2  - Continue Eliquis for thromboembolic ppx  given that patient has a [id number] score of 7 and hold Kory 5/15 PM dose and 5/16 AM dose for anticipated PPM extraction  - Continue care per primary team    Patient to be staffed with attending. Please await attending addendum

## 2022-05-13 NOTE — H&P ADULT - NSICDXPASTMEDICALHX_GEN_ALL_CORE_FT
PAST MEDICAL HISTORY:  Anxiety     Benign Essential Hypertension     CVA (cerebral vascular accident)     Depression     Essential hypertension     Fibromyalgia     Hypothyroidism     Spinal stenosis

## 2022-05-13 NOTE — H&P ADULT - NSICDXFAMILYHX_GEN_ALL_CORE_FT
FAMILY HISTORY:  FH: hypertension    Mother  Still living? Unknown  FH: CAD (coronary artery disease), Age at diagnosis: Age Unknown

## 2022-05-13 NOTE — H&P ADULT - NSHPSOCIALHISTORY_GEN_ALL_CORE
Tobacco Usage:  (x ) never smoked     Alcohol Usage: daily; Last drink: 2-3 cocktails a day to "help with depression and sleep"   Recreational drugs (x ) None  Lives alone   Retired. Used to be a vizcaino   Has 5 kids; one child is    Denies Recent travel

## 2022-05-13 NOTE — ED PROVIDER NOTE - PHYSICAL EXAMINATION
GENERAL: Awake. Alert. NAD. Well nourished.  HEENT: NC/AT, Conjunctiva pink, no scleral icterus. Airway patent. Moist mucous membranes.  LUNGS: CTAB. No wheezes or rales noted.  CARDIAC: Chest non-tender to palpation. RRR. erythema and edema noted at PPM site, purulent fluid draining upon expression of wound.  ABDOMEN: No masses noted. Soft, NT, ND, no rebound, no guarding.  EXT: No edema, no calf tenderness, distal pulses 2+ bilaterally  NEURO: A&Ox3. Moving all extremities. Sensation and strength intact throughout.   SKIN: Warm and dry. erythema and edema noted at PPM site, purulent fluid draining upon expression of wound.  PSYCH: Normal affect.

## 2022-05-13 NOTE — ED PROVIDER NOTE - CLINICAL SUMMARY MEDICAL DECISION MAKING FREE TEXT BOX
89F PMH PPM placement March 2022, HTN, HLD, fibromyalgia, spinal stenosis on chronic pain meds, anxiety on prn valium, afib on eliquis, CVA, CAD s/p PCI sent in by Dr. Jade for PPM infection. Pt afebrile, hemodynamically stable. Given hx and physical, ddx includes cellulitis vs sepsis vs abscess. Plan for labs, blood culture, wound culture, ecg, cxr, abx admission to medicine.

## 2022-05-13 NOTE — H&P ADULT - PROBLEM SELECTOR PLAN 5
- Continue with Eliquis, plavix and metoprolol - Continue with Eliquis 5 mg BID, plavix 75 mg qd and metoprolol 100 mg BID   - monitor BP

## 2022-05-13 NOTE — H&P ADULT - PROBLEM SELECTOR PLAN 1
- Patient with erythema and drainage at PPM site; PPM placement 3/24 by Dr. Jade   - EP planning for tentative PPM extraction with implantation ( after pt. is clear with no systemic infection). Schedule on 5/16 Monday.   - NPO after Sunday MN, AM labs and T&S , COVID swab  - Blood Ctx and wound Ctx X 2 sent  - Vanco and Cefepime IV   - ID consult   - tele monitoring   - Monitor electrolytes and replete K to 4 and Mg to 2  - Continue Eliquis for thromboembolic ppx and hold Kory 5/15 PM dose and 5/16 AM dose for anticipated PPM extraction  - Monitor CBC and fevers - Patient with erythema and drainage at PPM site; PPM placement 3/24 by Dr. Jade   - EP planning for tentative PPM extraction with implantation ( after pt. is clear with no systemic infection). Schedule on 5/16 Monday.   - CXR clear lungs   - NPO after Sunday MN, AM labs and T&S , COVID swab  - Blood Ctx and wound Ctx X 2 sent  - Vanco and Cefepime IV   - ID consult   - tele monitoring   - Monitor electrolytes and replete K to 4 and Mg to 2  - Continue Eliquis for thromboembolic ppx and hold Sunday 5/15 PM dose and 5/16 AM dose for anticipated PPM extraction  - Monitor CBC and fevers

## 2022-05-13 NOTE — H&P ADULT - PROBLEM SELECTOR PLAN 6
DVT ppx: Eliquis 5 mg BID, oob   PT c/s - Admits to 2-3 "cocktails" per night; last drink last night 5/13  - denies hx of withdrawal   -low risk symptom triggered CIWA ordered

## 2022-05-13 NOTE — CONSULT NOTE ADULT - NS ATTEND AMEND GEN_ALL_CORE FT
Pacemaker site infection. Patient will need pacemaker removed.  Check blood and surgical site wound cultures and administer antibiotics (vancomycin and cefepime).

## 2022-05-13 NOTE — ED ADULT NURSE NOTE - NSSEPSISSUSPECTED_ED_A_ED
Please return with new or worsening symptoms  Please follow up with PCP and use over the counter medications as needed  We will call with COVID/FLU results in the next 24-48 hrs
Yes

## 2022-05-13 NOTE — H&P ADULT - NSHPPHYSICALEXAM_GEN_ALL_CORE
Constitutional: NAD, well-developed, well-nourished, laying comfortable in bed  Ears, Nose, Mouth, and Throat: normal external ears and nose, normal hearing, moist oral mucosa  Eyes: normal conjunctiva, EOMI, PEERL  Neck: supple, no JVD  Respiratory: Clear to auscultation bilaterally. No wheezes, rales or rhonchi. Normal respiratory effort  Cardiovascular: irregular, + L chest wall PPM site with small amount saurognathous discharge, erythema, minimal tenderness, No hematoma   Gastrointestinal: soft, nontender, nondistended, +bowel sounds, no hernia  Skin: warm, dry   Neurologic: sensation grossly intact, CN grossly intact, non-focal exam  Musculoskeletal: no clubbing, no cyanosis, no joint swelling  Psychiatric: A&Ox3, tangental thinking

## 2022-05-13 NOTE — ED PROVIDER NOTE - OBJECTIVE STATEMENT
89F PMH PPM placement March 2022, HTN, HLD, fibromyalgia, spinal stenosis on chronic pain meds, anxiety on prn valium, afib on eliquis, CVA, CAD s/p PCI sent in by Dr. Jade for PPM infection. As per pt, reports occasional pain at PPM site. Denies fever, chills, skin changes. Denies chest pain, sob, cough, n/v. Spoke with Dr. Jade, reports expressing fluid from PPM site in office today, would like admission to medicine for IV abx and removal of pacemaker on monday with replacement.

## 2022-05-13 NOTE — H&P ADULT - PROBLEM SELECTOR PLAN 4
- continue with valium - Takes valium 5 mg TID prn   - Will continue with Valium 2.5 BID prn for anxiety - patient anxious on exam   - Takes valium 5 mg TID prn   - Will continue with Valium 2.5 BID prn for anxiety - patient anxious on exam   - Takes valium 5 mg TID prn   - Will continue with Valium 5 mg BID prn for anxiety

## 2022-05-13 NOTE — ED PROVIDER NOTE - PROGRESS NOTE DETAILS
Chinyere Morrison DO (PGY1): Spoke with cardiology, will continue eliquis, cardiology note with details regarding plan for holding medications. Spoke with Dr. Reeves, will admit to medicine.

## 2022-05-13 NOTE — H&P ADULT - HISTORY OF PRESENT ILLNESS
89 y/o F PMH PPM placement (March 2022), HTN, HLD, fibromyalgia, CLL (in remission), spinal stenosis, hypothyroidism, cataracts, hysterectomy, anxiety/depression, CAD presents to the ED s/p appointment with Dr. Jade with PPM site erythema and drainage. Patient is a limited historian and [place]. States erythema has been present since pacemaker implantation on 3/24/22. States she has kept site covered with antibiotic cream x 2 months. Admits to having an appointment 1 month ago and started on PO abx Cefadroxil on 4/11 as prophylaxis. Noticed a scab fell off the incision site a couple days ago; and then noticed clear/yellow fluid draining from the site. Denies pain or swelling at incision site. Today at 3 pm, patient had routine appointment with Dr. Jade; he recommended admission to hospital for PPM removal and IV Abx.   Admits to urinary frequency. Denies dysuria or hematuria. Admits to chronic constipation.     Denies F/C, N/V, HA, CP, SOB, palpitations, cough, diaphoresis, vision changes, sore throat, abd pain, diarrhea, numbness, weakness, rashes, pain.    89 y/o F PMH PPM placement (March 2022), HTN, HLD, fibromyalgia, CLL (in remission), spinal stenosis, hypothyroidism, cataracts, hysterectomy, anxiety/depression, CAD presents to the ED s/p appointment with Dr. Jade with PPM site erythema and drainage. Patient is a limited historian and [place]. States erythema has been present since pacemaker implantation on 3/24/22. States she has kept site covered with antibiotic cream x 2 months. Admits to having an appointment 1 month ago and started on PO abx Cefadroxil on 4/11 as prophylaxis. Noticed a scab fell off the incision site a couple days ago; and then noticed clear/yellow fluid draining from the site. Denies pain or swelling at incision site. Today at 3 pm, patient had routine appointment with Dr. Jade; he recommended admission to hospital for PPM removal and IV Abx.   Admits to urinary frequency. Denies dysuria or hematuria. Admits to chronic constipation.     Denies F/C, N/V, HA, CP, SOB, palpitations, cough, diaphoresis, vision changes, sore throat, abd pain, diarrhea, numbness, weakness, rashes, pain.     ED course: VSS    89 y/o F PMH PPM placement (March 2022), HTN, HLD, fibromyalgia, CLL (in remission), spinal stenosis, hypothyroidism, cataracts, hysterectomy, anxiety/depression, CAD presents to the ED s/p appointment with Dr. Jade with PPM site erythema and drainage. Patient is a limited historian and [place]. States erythema has been present since pacemaker implantation on 3/24/22. States she has kept site covered with antibiotic cream x 2 months. Admits to having an appointment 1 month ago and started on PO abx Cefadroxil on 4/11 as prophylaxis. Noticed a scab fell off the incision site a couple days ago; and then noticed clear/yellow fluid draining from the site. Denies pain or swelling at incision site. Today at 3 pm, patient had routine appointment with Dr. Jade; he recommended admission to hospital for PPM removal and IV Abx.   Admits to urinary frequency. Denies dysuria or hematuria. Admits to chronic constipation.     Denies F/C, N/V, HA, CP, SOB, palpitations, cough, diaphoresis, vision changes, sore throat, abd pain, diarrhea, numbness, weakness, rashes, pain.     ED course: VSS, s/p Cefepime IV x 1, s/p Vanco x 1

## 2022-05-13 NOTE — H&P ADULT - NS ATTEND AMEND GEN_ALL_CORE FT
89 y/o F PMH PPM placement (March 2022), HTN, HLD, fibromyalgia, CLL (in remission), spinal stenosis, hypothyroidism, cataracts, hysterectomy, anxiety/depression, CAD presents to the ED s/p appointment with Dr. Jade with PPM site erythema and drainage admitted to hospital for IV abx and PPM removal.       Agree w. above    #Sepsis 2/2 infected pacemaker  c/w vanc cefepime pending culture data  plan for pacemaker extraction Monday if systemic signs of infection are clear  f/u blood cultures times 2   NPO pm on Sunday, STOP eliquis Sunday evening dose, obtain coags, T/S, and am labs for Monday morning. Obtain COVID-19 PCR Sunday evening    #Anxiety  is on valium 5 mg TID, c/w 5 mg BID prn anxiety want to avoid benzo withdrawals  if patient is extremely anxious, can give one time additional dose 0.5 mg valium but avoid giving too much 89 y/o F PMH PPM placement (March 2022), HTN, HLD, fibromyalgia, CLL (in remission), spinal stenosis, hypothyroidism, cataracts, hysterectomy, anxiety/depression, CAD presents to the ED s/p appointment with Dr. Jade with PPM site erythema and drainage admitted to hospital for IV abx and PPM removal.       Agree w. above    #Sepsis 2/2 infected pacemaker  c/w vanc cefepime pending culture data  plan for pacemaker extraction Monday if systemic signs of infection are clear  f/u blood cultures times 2   NPO pm on Sunday, STOP eliquis Sunday evening dose, obtain coags, T/S, and am labs for Monday morning. Obtain COVID-19 PCR Sunday evening    #Anxiety  pt was filled valium 5 mg 30 day supply 3/31, can just give 2.5 mg valium BID prn anxiety

## 2022-05-14 LAB
APPEARANCE UR: CLEAR — SIGNIFICANT CHANGE UP
BILIRUB UR-MCNC: NEGATIVE — SIGNIFICANT CHANGE UP
COLOR SPEC: SIGNIFICANT CHANGE UP
DIFF PNL FLD: NEGATIVE — SIGNIFICANT CHANGE UP
GLUCOSE UR QL: NEGATIVE — SIGNIFICANT CHANGE UP
KETONES UR-MCNC: NEGATIVE — SIGNIFICANT CHANGE UP
LEUKOCYTE ESTERASE UR-ACNC: NEGATIVE — SIGNIFICANT CHANGE UP
NITRITE UR-MCNC: NEGATIVE — SIGNIFICANT CHANGE UP
PH UR: 7.5 — SIGNIFICANT CHANGE UP (ref 5–8)
PROT UR-MCNC: NEGATIVE — SIGNIFICANT CHANGE UP
SARS-COV-2 RNA SPEC QL NAA+PROBE: SIGNIFICANT CHANGE UP
SP GR SPEC: 1.01 — SIGNIFICANT CHANGE UP (ref 1–1.05)
UROBILINOGEN FLD QL: SIGNIFICANT CHANGE UP

## 2022-05-14 PROCEDURE — 99223 1ST HOSP IP/OBS HIGH 75: CPT | Mod: GC

## 2022-05-14 PROCEDURE — 99233 SBSQ HOSP IP/OBS HIGH 50: CPT

## 2022-05-14 PROCEDURE — 99232 SBSQ HOSP IP/OBS MODERATE 35: CPT | Mod: GC

## 2022-05-14 RX ORDER — VANCOMYCIN HCL 1 G
750 VIAL (EA) INTRAVENOUS EVERY 24 HOURS
Refills: 0 | Status: DISCONTINUED | OUTPATIENT
Start: 2022-05-14 | End: 2022-05-16

## 2022-05-14 RX ORDER — DIAZEPAM 5 MG
5 TABLET ORAL
Refills: 0 | Status: DISCONTINUED | OUTPATIENT
Start: 2022-05-14 | End: 2022-05-14

## 2022-05-14 RX ORDER — CEFEPIME 1 G/1
1000 INJECTION, POWDER, FOR SOLUTION INTRAMUSCULAR; INTRAVENOUS EVERY 24 HOURS
Refills: 0 | Status: DISCONTINUED | OUTPATIENT
Start: 2022-05-14 | End: 2022-05-14

## 2022-05-14 RX ORDER — CLOPIDOGREL BISULFATE 75 MG/1
75 TABLET, FILM COATED ORAL DAILY
Refills: 0 | Status: DISCONTINUED | OUTPATIENT
Start: 2022-05-14 | End: 2022-05-20

## 2022-05-14 RX ORDER — DIAZEPAM 5 MG
2.5 TABLET ORAL EVERY 12 HOURS
Refills: 0 | Status: DISCONTINUED | OUTPATIENT
Start: 2022-05-14 | End: 2022-05-19

## 2022-05-14 RX ADMIN — OXYCODONE HYDROCHLORIDE 5 MILLIGRAM(S): 5 TABLET ORAL at 15:41

## 2022-05-14 RX ADMIN — APIXABAN 2.5 MILLIGRAM(S): 2.5 TABLET, FILM COATED ORAL at 17:10

## 2022-05-14 RX ADMIN — OXYCODONE HYDROCHLORIDE 5 MILLIGRAM(S): 5 TABLET ORAL at 16:10

## 2022-05-14 RX ADMIN — APIXABAN 2.5 MILLIGRAM(S): 2.5 TABLET, FILM COATED ORAL at 05:00

## 2022-05-14 RX ADMIN — OXYCODONE AND ACETAMINOPHEN 1 TABLET(S): 5; 325 TABLET ORAL at 00:56

## 2022-05-14 RX ADMIN — OXYCODONE HYDROCHLORIDE 5 MILLIGRAM(S): 5 TABLET ORAL at 05:07

## 2022-05-14 RX ADMIN — Medication 250 MILLIGRAM(S): at 22:29

## 2022-05-14 RX ADMIN — PANTOPRAZOLE SODIUM 40 MILLIGRAM(S): 20 TABLET, DELAYED RELEASE ORAL at 05:01

## 2022-05-14 RX ADMIN — CLOPIDOGREL BISULFATE 75 MILLIGRAM(S): 75 TABLET, FILM COATED ORAL at 22:27

## 2022-05-14 RX ADMIN — OXYCODONE HYDROCHLORIDE 5 MILLIGRAM(S): 5 TABLET ORAL at 23:27

## 2022-05-14 RX ADMIN — OXYCODONE HYDROCHLORIDE 5 MILLIGRAM(S): 5 TABLET ORAL at 22:27

## 2022-05-14 RX ADMIN — OXYCODONE HYDROCHLORIDE 5 MILLIGRAM(S): 5 TABLET ORAL at 06:07

## 2022-05-14 RX ADMIN — Medication 100 MILLIGRAM(S): at 05:00

## 2022-05-14 RX ADMIN — Medication 2.5 MILLIGRAM(S): at 23:11

## 2022-05-14 RX ADMIN — Medication 112 MICROGRAM(S): at 05:01

## 2022-05-14 RX ADMIN — OXYCODONE AND ACETAMINOPHEN 1 TABLET(S): 5; 325 TABLET ORAL at 00:26

## 2022-05-14 RX ADMIN — Medication 100 MILLIGRAM(S): at 17:10

## 2022-05-14 NOTE — PROGRESS NOTE ADULT - ASSESSMENT
89y old  Female with a PMH of HTN, HLD, fibromyalgia, spinal stenosis on chronic pain medications, Afib on Eliquis,  s/p [employer]tr[employer] PPM on 3/24 Afib with TBS, CVA, CAD s/p WAGNER to LAD in 12/21 at [facility], hypothyroidism, anxiety, sent in from EP clinic for PPM site infection. Patient went to EP clinic for wound check and noted to have erythema and discharge from pacemaker site. Of note, she was treated with PO antibiotic Cefadroxil on 4/11 for ppm site ppx. Pt. denies trauma to the site, pain, CP, palpitation, SOB, fever or chills.       PPM site infection-- Will have PPM extraction and reimplantation after clear from systemic infection. The process of the procedures along with the risks and benefits for each procedure were explained in detail which included but not limited to bleeding requiring transfusion, infection, stroke, plural effusion, esophageal injury, pericardial effusion, cardiac tamponade requiring chest tube, intubation, and death. Patient expressed understanding and all questions were answered. Patient is agreeable and consented.      RECOMMENDATIONS:  - tentative schedule on 5/16 Monday for PPM extraction with implantation ( after pt. is clear with no systemic infection)  - NPO after Sunday MN, AM labs and T&S , COVID swab  - Blood Ctx and wound Ctx X 2 sent, broad spectrum ABX Vanco and Cefepime started, ID consult appreciated  - Please place pt. on continuous telemetric monitoring  - Monitor electrolytes and replete K to 4 and Mg to 2  - Continue Eliquis for thromboembolic ppx  given that patient has a [id number] score of 7 and hold Sunday 5/15 PM dose and 5/16 AM dose for anticipated PPM extraction  - Continue care per primary team    Paul Cabrera PGY6  All Cardiology service information can be found 24/7 on [email or web address], password: [email or web address]

## 2022-05-14 NOTE — CONSULT NOTE ADULT - ATTENDING COMMENTS
89F anxiety/depression, CAD, HTN, chol, CLL (remission), tachy/giselle s/p PPM placement (March 2022).  Referred to the ER after beeing seen by EP.  PPM site noted to be erythematous with drainage.  No fever.  No leukocytosis.  BC and drainage culture sent.  Started empirically on vancomycin and cefepime.  ID asked to help management.      PPM infection  - seems superficial  - await drainage cultures  - f/u BC  - vancomycin alone for now  - decrease to 750mg daily  - monitor creatinine  - d/c cefepime    Please call the ID service [phone] with questions or concerns over the weekend

## 2022-05-14 NOTE — PROGRESS NOTE ADULT - PROBLEM SELECTOR PLAN 1
- Patient with erythema and drainage at PPM site; PPM placement 3/24 by Dr. Jade   - EP planning for tentative PPM extraction with implantation ( after pt. is clear with no systemic infection). Schedule on 5/16 Monday.   - CXR clear lungs   - NPO after Sunday MN, AM labs and T&S , COVID swab  - Blood Ctx and wound Ctx X 2 sent  - Vanco and Cefepime IV   - ID consult   - tele monitoring   - Monitor electrolytes and replete K to 4 and Mg to 2  - Continue Eliquis for thromboembolic ppx and hold Sunday 5/15 PM dose and 5/16 AM dose for anticipated PPM extraction  - Monitor CBC and fevers - Patient with erythema and drainage at PPM site; PPM placement 3/24 by Dr. Jade   - EP planning for tentative PPM extraction with implantation ( after pt. is clear with no systemic infection). Schedule on 5/16 Monday.   - CXR clear lungs   - NPO after Sunday MN, AM labs and T&S , COVID swab  - Blood Ctx and wound Ctx X 2 sent  - IV Vanco    - ID consulted appreciated rec    - tele monitoring   - Monitor electrolytes and replete K to 4 and Mg to 2  - Continue Eliquis for thromboembolic ppx and hold Sunday 5/15 PM dose and 5/16 AM dose for anticipated PPM extraction  - Monitor CBC and fevers

## 2022-05-14 NOTE — PATIENT PROFILE ADULT - FALL HARM RISK - HARM RISK INTERVENTIONS
Assistance with ambulation/Assistance OOB with selected safe patient handling equipment/Communicate Risk of Fall with Harm to all staff/Monitor for mental status changes/Monitor gait and stability/Reinforce activity limits and safety measures with patient and family/Tailored Fall Risk Interventions/Toileting schedule using arm’s reach rule for commode and bathroom/Use of alarms - bed, chair and/or voice tab/Visual Cue: Yellow wristband and red socks/Bed in lowest position, wheels locked, appropriate side rails in place/Call bell, personal items and telephone in reach/Instruct patient to call for assistance before getting out of bed or chair/Non-slip footwear when patient is out of bed/[place] to call system/Physically safe environment - no spills, clutter or unnecessary equipment/Purposeful Proactive Rounding/Room/bathroom lighting operational, light cord in reach

## 2022-05-14 NOTE — CONSULT NOTE ADULT - SUBJECTIVE AND OBJECTIVE BOX
Patient is a 89y old  Female who presents with a chief complaint of PPM site infection (14 May 2022 08:49)    HPI:  89 y/o F PMH PPM placement (2022), HTN, HLD, fibromyalgia, CLL (in remission), spinal stenosis, hypothyroidism, cataracts, hysterectomy, anxiety/depression, CAD presents to the ED s/p appointment with Dr. Jade with PPM site erythema and drainage. Patient is a limited historian and [place]. States erythema has been present since pacemaker implantation on 3/24/22. States she has kept site covered with antibiotic cream x 2 months. Admits to having an appointment 1 month ago and started on PO abx Cefadroxil on  as prophylaxis. Noticed a scab fell off the incision site a couple days ago; and then noticed clear/yellow fluid draining from the site. Denies pain or swelling at incision site. Today at 3 pm, patient had routine appointment with Dr. Jade; he recommended admission to hospital for PPM removal and IV Abx.   Admits to urinary frequency. Denies dysuria or hematuria. Admits to chronic constipation.     ID consulted for PPM site infection.     REVIEW OF SYSTEMS  [  ] ROS unobtainable because:    [ X ] All other systems negative except as noted below    Constitutional:  [ ] fever [ ] chills  [ ] weight loss  [ ]night sweat  [ ]poor appetite/PO intake [ ]fatigue   Skin:  [ ] rash [ ] phlebitis	  Eyes: [ ] icterus [ ] pain  [ ] discharge	  ENMT: [ ] sore throat  [ ] thrush [ ] ulcers [ ] exudates [ ]anosmia  Respiratory: [ ] dyspnea [ ] hemoptysis [ ] cough [ ] sputum	  Cardiovascular:  [ ] chest pain [ ] palpitations [ ] edema	  Gastrointestinal:  [ ] nausea [ ] vomiting [ ] diarrhea [ ] constipation [ ] pain	  Genitourinary:  [ ] dysuria [ ] frequency [ ] hematuria [ ] discharge [ ] flank pain  [ ] incontinence  Musculoskeletal:  [ ] myalgias [ ] arthralgias [ ] arthritis  [ ] back pain  Neurological:  [ ] headache [ ] weakness [ ] seizures  [ ] confusion/altered mental status    prior hospital charts reviewed [V]  primary team notes reviewed [V]  other consultant notes reviewed [V]    PAST MEDICAL & SURGICAL HISTORY:  Benign Essential Hypertension  Hypothyroidism  Depression  Fibromyalgia  CVA (cerebral vascular accident)  Spinal stenosis  Essential hypertension  Anxiety  H/O: Hysterectomy 40 years ago  History of Tonsillectomy, childhood  S/P Laminectomy  Lumbar Laminectomy   Cataract ,     FAMILY HISTORY:  FH: CAD (coronary artery disease) (Mother)  FH: hypertension    SOCIAL HISTORY:  Denied smoking    Allergies  dust (Sneezing)  lactose (Unknown)  nonsteroidal anti-inflammatory agents (Unknown)  Originally Entered as [Unknown] reaction to [SULFITE] (Unknown)  penicillin (Rash)    ANTIMICROBIALS:  cefepime   IVPB 1000 every 24 hours  vancomycin  IVPB 1000 every 24 hours    ANTIMICROBIALS (past 90 days):   MEDICATIONS  (STANDING):  cefepime   IVPB   100 mL/Hr IV Intermittent (22 @ 20:44)    vancomycin  IVPB.   250 mL/Hr IV Intermittent (22 @ 21:15)    MEDICATIONS  (STANDING):  acetaminophen     Tablet .. 650 every 6 hours PRN  apixaban 2.5 two times a day  diazepam    Tablet 2.5 every 12 hours PRN  levothyroxine 112 daily  melatonin 3 at bedtime PRN  metoprolol succinate  two times a day  oxyCODONE    IR 5 every 6 hours PRN  pantoprazole    Tablet 40 before breakfast    VITALS:  Vital Signs Last 24 Hrs  T(F): 97.9 (22 @ 11:52), Max: 98.2 (22 @ 17:38)  Vital Signs Last 24 Hrs  HR: 87 (22 @ 11:52) (70 - 87)  BP: 142/95 (22 @ 11:52) (142/95 - 180/92)  RR: 19 (22 @ 11:52)  SpO2: 98% (22 @ 11:52) (98% - 99%)  Wt(kg): --    PHYSICAL EXAM:  Constitutional: non-toxic, no distress  HEAD/EYES: anicteric, no conjunctival injection  ENT:  supple, no thrush  Cardiovascular:   normal S1/S2, no murmur, no edema  Respiratory:  clear BS bilaterally, no wheezes, no rales  GI:  soft, non-tender, normal bowel sounds  :  no chauhan, no CVA tenderness  Musculoskeletal:  no synovitis, normal ROM  Neurologic: awake and alert,  no focal findings  Skin:  no rash, no erythema, no phlebitis  Heme/Onc: no lymphadenopathy   Psychiatric:  awake, alert, appropriate mood    Labs:                        14.8   6.77  )-----------( 194      ( 13 May 2022 20:48 )             42.3         131<L>  |  97<L>  |  16  ----------------------------<  110<H>  4.1   |  20<L>  |  0.85    Ca    9.8      13 May 2022 20:48  Mg     1.90         TPro  7.2  /  Alb  4.8  /  TBili  1.3<H>  /  DBili  x   /  AST  26  /  ALT  16  /  AlkPhos  87      WBC Trend:  WBC Count: 6.77 (22 @ 20:48)    Auto Neutrophil #: 4.63 K/uL (22 @ 20:48)  Auto Neutrophil #: 4.68 K/uL (22 @ 06:53)  Auto Neutrophil #: 3.53 K/uL (22 @ 06:37)  Auto Neutrophil #: 7.84 K/uL (22 @ 17:16)  Auto Neutrophil #: 5.42 K/uL (21 @ 23:32)    Auto Eosinophil %: 0.6 % (22 @ 20:48)    Urinalysis Basic - ( 13 May 2022 23:56 )    Color: Light Yellow / Appearance: Clear / S.009 / pH: x  Gluc: x / Ketone: Negative  / Bili: Negative / Urobili: <2 mg/dL   Blood: x / Protein: Negative / Nitrite: Negative   Leuk Esterase: Negative / RBC: x / WBC x   Sq Epi: x / Non Sq Epi: x / Bacteria: x    MICROBIOLOGY:  Culture - Urine (collected 22 Mar 2022 21:26)  Source: Clean Catch Clean Catch (Midstream)  Final Report:    <10,000 CFU/mL Normal Urogenital Rhona    COVID-19 PCR: NotDetec (22 @ 21:20)  COVID-19 PCR: NotDetec (22 @ 17:14)    RADIOLOGY:  imaging below personally reviewed  < from: Xray Chest 1 View- PORTABLE-Urgent (Xray Chest 1 View- PORTABLE-Urgent .) (22 @ 20:12) >  IMPRESSION:  Left chest wall pacemaker with leads overlying right atrium and right   ventricle.  The lungs are clear. No pleural effusions or pneumothorax.  The heart size cannot accurately be assessed on this projection. Mitral   annular calcifications.  Generalized osteopenia. Spinal degenerative osseous changes.    < end of copied text >     Patient is a 89y old  Female who presents with a chief complaint of PPM site infection (14 May 2022 08:49)    HPI:  89F s/p PPM placement (2022), HTN, HLD, fibromyalgia, CLL (in remission), spinal stenosis, hypothyroidism, cataracts, hysterectomy, anxiety/depression, CAD presents to the ED s/p appointment with Dr. Jade with PPM site erythema and drainage. Patient is a limited historian and [place]. States erythema has been present since pacemaker implantation on 3/24/22. States she has kept site covered with antibiotic cream x 2 months. Admits to having an appointment 1 month ago and started on PO abx Cefadroxil on  as prophylaxis. Noticed a scab fell off the incision site a couple days ago; and then noticed clear/yellow fluid draining from the site. Denies pain or swelling at incision site. Today at 3 pm, patient had routine appointment with Dr. Jade; he recommended admission to hospital for PPM removal and IV Abx.   Admits to urinary frequency. Denies dysuria or hematuria. Admits to chronic constipation.     ID consulted for PPM site infection.     REVIEW OF SYSTEMS  [  ] ROS unobtainable because:    [ X ] All other systems negative except as noted below    Constitutional:  [ ] fever [ ] chills  [ ] weight loss  [ ]night sweat  [ ]poor appetite/PO intake [ ]fatigue   Skin:  [ ] rash [ ] phlebitis	  Eyes: [ ] icterus [ ] pain  [ ] discharge	  ENMT: [ ] sore throat  [ ] thrush [ ] ulcers [ ] exudates [ ]anosmia  Respiratory: [ ] dyspnea [ ] hemoptysis [ ] cough [ ] sputum	  Cardiovascular:  [ ] chest pain [ ] palpitations [ ] edema	  Gastrointestinal:  [ ] nausea [ ] vomiting [ ] diarrhea [ ] constipation [ ] pain	  Genitourinary:  [ ] dysuria [ ] frequency [ ] hematuria [ ] discharge [ ] flank pain  [ ] incontinence  Musculoskeletal:  [ ] myalgias [ ] arthralgias [ ] arthritis  [ ] back pain  Neurological:  [ ] headache [ ] weakness [ ] seizures  [ ] confusion/altered mental status    prior hospital charts reviewed [V]  primary team notes reviewed [V]  other consultant notes reviewed [V]    PAST MEDICAL & SURGICAL HISTORY:  Benign Essential Hypertension  Hypothyroidism  Depression  Fibromyalgia  CVA (cerebral vascular accident)  Spinal stenosis  Essential hypertension  Anxiety  H/O: Hysterectomy 40 years ago  History of Tonsillectomy, childhood  S/P Laminectomy  Lumbar Laminectomy   Cataract ,     FAMILY HISTORY:  FH: CAD (coronary artery disease) (Mother)  FH: hypertension    SOCIAL HISTORY:  Denied smoking    Allergies  dust (Sneezing)  lactose (Unknown)  nonsteroidal anti-inflammatory agents (Unknown)  Originally Entered as [Unknown] reaction to [SULFITE] (Unknown)  penicillin (Rash)    ANTIMICROBIALS:  cefepime   IVPB 1000 every 24 hours (-)  vancomycin  IVPB 1000 every 24 hours (-)    MEDICATIONS  (STANDING):  apixaban 2.5 two times a day  levothyroxine 112 daily  metoprolol succinate  two times a day  pantoprazole    Tablet 40 before breakfast    Vital Signs Last 24 Hrs  T(F): 97.9 (22 @ 11:52), Max: 98.2 (22 @ 17:38)  Vital Signs Last 24 Hrs  HR: 87 (22 @ 11:52) (70 - 87)  BP: 142/95 (22 @ 11:52) (142/95 - 180/92)  RR: 19 (22 @ 11:52)  SpO2: 98% (22 @ 11:52) (98% - 99%)  Wt(kg): --    PHYSICAL EXAM:  Constitutional: non-toxic, no distress  HEAD/EYES: anicteric, no conjunctival injection  ENT:  supple, no thrush  Cardiovascular:   normal S1/S2, no murmur, no edema  Respiratory:  clear BS bilaterally, no wheezes, no rales  GI:  soft, non-tender, normal bowel sounds  :  no chauhan, no CVA tenderness  Musculoskeletal:  no synovitis, normal ROM  Neurologic: awake and alert,  no focal findings  Skin:  no rash, no erythema, no phlebitis  Psychiatric:  awake, alert, appropriate mood                        14.8   6.77  )-----------( 194      ( 13 May 2022 20:48 )             42.3     131<L>  |  97<L>  |  16  ----------------------------<  110<H>  4.1   |  20<L>  |  0.85    Ca    9.8      13 May 2022 20:48  Mg     1.90         TPro  7.2  /  Alb  4.8  /  TBili  1.3<H>  /  DBili  x   /  AST  26  /  ALT  16  /  AlkPhos  87      Urinalysis Basic - ( 13 May 2022 23:56 )  Color: Light Yellow / Appearance: Clear / S.009 / pH: x  Gluc: x / Ketone: Negative  / Bili: Negative / Urobili: <2 mg/dL   Blood: x / Protein: Negative / Nitrite: Negative   Leuk Esterase: Negative / RBC: x / WBC x   Sq Epi: x / Non Sq Epi: x / Bacteria: x    MICROBIOLOGY:   other and BC pending    Culture - Urine (collected 22 Mar 2022 21:26)  Source: Clean Catch Clean Catch (Midstream)  Final Report:    <10,000 CFU/mL Normal Urogenital Rhona    COVID-19 PCR: NotDetec (22 @ 21:20)  COVID-19 PCR: NotDetec (22 @ 17:14)    RADIOLOGY:  imaging below personally reviewed    Xray Chest 1 View- PORTABLE-Urgent (Xray Chest 1 View- PORTABLE-Urgent .) (22 @ 20:12) >  IMPRESSION:  Left chest wall pacemaker with leads overlying right atrium and right ventricle.  The lungs are clear. No pleural effusions or pneumothorax.  The heart size cannot accurately be assessed on this projection. Mitral annular calcifications.  Generalized osteopenia. Spinal degenerative osseous changes.

## 2022-05-15 LAB
ANION GAP SERPL CALC-SCNC: 13 MMOL/L — SIGNIFICANT CHANGE UP (ref 7–14)
BASOPHILS # BLD AUTO: 0.01 K/UL — SIGNIFICANT CHANGE UP (ref 0–0.2)
BASOPHILS NFR BLD AUTO: 0.2 % — SIGNIFICANT CHANGE UP (ref 0–2)
BLD GP AB SCN SERPL QL: NEGATIVE — SIGNIFICANT CHANGE UP
BUN SERPL-MCNC: 41 MG/DL — HIGH (ref 7–23)
CALCIUM SERPL-MCNC: 9.9 MG/DL — SIGNIFICANT CHANGE UP (ref 8.4–10.5)
CHLORIDE SERPL-SCNC: 103 MMOL/L — SIGNIFICANT CHANGE UP (ref 98–107)
CO2 SERPL-SCNC: 22 MMOL/L — SIGNIFICANT CHANGE UP (ref 22–31)
CREAT SERPL-MCNC: 1.24 MG/DL — SIGNIFICANT CHANGE UP (ref 0.5–1.3)
CULTURE RESULTS: NO GROWTH — SIGNIFICANT CHANGE UP
CULTURE RESULTS: NO GROWTH — SIGNIFICANT CHANGE UP
EGFR: 40 ML/MIN/1.73M2 — LOW
EOSINOPHIL # BLD AUTO: 0.06 K/UL — SIGNIFICANT CHANGE UP (ref 0–0.5)
EOSINOPHIL NFR BLD AUTO: 1 % — SIGNIFICANT CHANGE UP (ref 0–6)
GLUCOSE SERPL-MCNC: 108 MG/DL — HIGH (ref 70–99)
HCT VFR BLD CALC: 31.8 % — LOW (ref 34.5–45)
HCT VFR BLD CALC: 43.8 % — SIGNIFICANT CHANGE UP (ref 34.5–45)
HGB BLD-MCNC: 10.4 G/DL — LOW (ref 11.5–15.5)
HGB BLD-MCNC: 15.1 G/DL — SIGNIFICANT CHANGE UP (ref 11.5–15.5)
IANC: 4.41 K/UL — SIGNIFICANT CHANGE UP (ref 1.8–7.4)
IMM GRANULOCYTES NFR BLD AUTO: 0.3 % — SIGNIFICANT CHANGE UP (ref 0–1.5)
LYMPHOCYTES # BLD AUTO: 0.89 K/UL — LOW (ref 1–3.3)
LYMPHOCYTES # BLD AUTO: 15 % — SIGNIFICANT CHANGE UP (ref 13–44)
MAGNESIUM SERPL-MCNC: 2.1 MG/DL — SIGNIFICANT CHANGE UP (ref 1.6–2.6)
MCHC RBC-ENTMCNC: 30.1 PG — SIGNIFICANT CHANGE UP (ref 27–34)
MCHC RBC-ENTMCNC: 30.4 PG — SIGNIFICANT CHANGE UP (ref 27–34)
MCHC RBC-ENTMCNC: 32.7 GM/DL — SIGNIFICANT CHANGE UP (ref 32–36)
MCHC RBC-ENTMCNC: 34.5 GM/DL — SIGNIFICANT CHANGE UP (ref 32–36)
MCV RBC AUTO: 87.3 FL — SIGNIFICANT CHANGE UP (ref 80–100)
MCV RBC AUTO: 93 FL — SIGNIFICANT CHANGE UP (ref 80–100)
MONOCYTES # BLD AUTO: 0.54 K/UL — SIGNIFICANT CHANGE UP (ref 0–0.9)
MONOCYTES NFR BLD AUTO: 9.1 % — SIGNIFICANT CHANGE UP (ref 2–14)
NEUTROPHILS # BLD AUTO: 4.41 K/UL — SIGNIFICANT CHANGE UP (ref 1.8–7.4)
NEUTROPHILS NFR BLD AUTO: 74.4 % — SIGNIFICANT CHANGE UP (ref 43–77)
NRBC # BLD: 0 /100 WBCS — SIGNIFICANT CHANGE UP
NRBC # BLD: 0 /100 WBCS — SIGNIFICANT CHANGE UP
NRBC # FLD: 0 K/UL — SIGNIFICANT CHANGE UP
NRBC # FLD: 0 K/UL — SIGNIFICANT CHANGE UP
PHOSPHATE SERPL-MCNC: 4.2 MG/DL — SIGNIFICANT CHANGE UP (ref 2.5–4.5)
PLATELET # BLD AUTO: 180 K/UL — SIGNIFICANT CHANGE UP (ref 150–400)
PLATELET # BLD AUTO: 637 K/UL — HIGH (ref 150–400)
POTASSIUM SERPL-MCNC: 4.3 MMOL/L — SIGNIFICANT CHANGE UP (ref 3.5–5.3)
POTASSIUM SERPL-SCNC: 4.3 MMOL/L — SIGNIFICANT CHANGE UP (ref 3.5–5.3)
RBC # BLD: 3.42 M/UL — LOW (ref 3.8–5.2)
RBC # BLD: 5.02 M/UL — SIGNIFICANT CHANGE UP (ref 3.8–5.2)
RBC # FLD: 13.2 % — SIGNIFICANT CHANGE UP (ref 10.3–14.5)
RBC # FLD: 14.6 % — HIGH (ref 10.3–14.5)
RH IG SCN BLD-IMP: POSITIVE — SIGNIFICANT CHANGE UP
SARS-COV-2 RNA SPEC QL NAA+PROBE: SIGNIFICANT CHANGE UP
SODIUM SERPL-SCNC: 138 MMOL/L — SIGNIFICANT CHANGE UP (ref 135–145)
SPECIMEN SOURCE: SIGNIFICANT CHANGE UP
SPECIMEN SOURCE: SIGNIFICANT CHANGE UP
VANCOMYCIN TROUGH SERPL-MCNC: 5.1 UG/ML — LOW (ref 10–20)
WBC # BLD: 14.39 K/UL — HIGH (ref 3.8–10.5)
WBC # BLD: 5.93 K/UL — SIGNIFICANT CHANGE UP (ref 3.8–10.5)
WBC # FLD AUTO: 14.39 K/UL — HIGH (ref 3.8–10.5)
WBC # FLD AUTO: 5.93 K/UL — SIGNIFICANT CHANGE UP (ref 3.8–10.5)

## 2022-05-15 PROCEDURE — 99232 SBSQ HOSP IP/OBS MODERATE 35: CPT

## 2022-05-15 PROCEDURE — 99232 SBSQ HOSP IP/OBS MODERATE 35: CPT | Mod: GC

## 2022-05-15 PROCEDURE — 99233 SBSQ HOSP IP/OBS HIGH 50: CPT

## 2022-05-15 RX ORDER — ACETAMINOPHEN 500 MG
1000 TABLET ORAL ONCE
Refills: 0 | Status: DISCONTINUED | OUTPATIENT
Start: 2022-05-15 | End: 2022-05-20

## 2022-05-15 RX ADMIN — Medication 2.5 MILLIGRAM(S): at 16:51

## 2022-05-15 RX ADMIN — OXYCODONE HYDROCHLORIDE 5 MILLIGRAM(S): 5 TABLET ORAL at 15:28

## 2022-05-15 RX ADMIN — CLOPIDOGREL BISULFATE 75 MILLIGRAM(S): 75 TABLET, FILM COATED ORAL at 11:11

## 2022-05-15 RX ADMIN — PANTOPRAZOLE SODIUM 40 MILLIGRAM(S): 20 TABLET, DELAYED RELEASE ORAL at 05:11

## 2022-05-15 RX ADMIN — APIXABAN 2.5 MILLIGRAM(S): 2.5 TABLET, FILM COATED ORAL at 05:10

## 2022-05-15 RX ADMIN — OXYCODONE HYDROCHLORIDE 5 MILLIGRAM(S): 5 TABLET ORAL at 05:14

## 2022-05-15 RX ADMIN — Medication 112 MICROGRAM(S): at 05:10

## 2022-05-15 RX ADMIN — OXYCODONE HYDROCHLORIDE 5 MILLIGRAM(S): 5 TABLET ORAL at 06:14

## 2022-05-15 RX ADMIN — Medication 100 MILLIGRAM(S): at 05:11

## 2022-05-15 RX ADMIN — OXYCODONE HYDROCHLORIDE 5 MILLIGRAM(S): 5 TABLET ORAL at 21:29

## 2022-05-15 RX ADMIN — OXYCODONE HYDROCHLORIDE 5 MILLIGRAM(S): 5 TABLET ORAL at 22:30

## 2022-05-15 RX ADMIN — Medication 650 MILLIGRAM(S): at 10:30

## 2022-05-15 RX ADMIN — Medication 650 MILLIGRAM(S): at 09:55

## 2022-05-15 RX ADMIN — Medication 3 MILLIGRAM(S): at 21:29

## 2022-05-15 RX ADMIN — Medication 100 MILLIGRAM(S): at 17:21

## 2022-05-15 RX ADMIN — Medication 250 MILLIGRAM(S): at 21:47

## 2022-05-15 NOTE — PROGRESS NOTE ADULT - PROBLEM SELECTOR PLAN 1
- Patient with erythema and drainage at PPM site; PPM placement 3/24 by Dr. Jade   - EP plan for tentative PPM extraction with implantation schedule on 5/16 Monday.   - CXR clear lungs   - NPO after Sunday MN, AM labs and T&S , COVID swab  - Blood Ctx and wound Ctx X 2   - IV Vanco    - ID consulted appreciated rec    - tele monitoring   - Monitor electrolytes and replete K to 4 and Mg to 2  - Hold Eliquis Kory 5/15 PM dose and 5/16 AM dose for anticipated PPM extraction  - Monitor CBC and fevers

## 2022-05-15 NOTE — PROGRESS NOTE ADULT - ASSESSMENT
89F anxiety/depression, CAD, HTN, chol, CLL (remission), tachy/giselle s/p PPM placement (March 2022).  Referred to the ER after being seen by EP.  PPM site noted to be erythematous with drainage.  No fever.  No leukocytosis.  BC and drainage culture sent.  ID asked to help management.      PPM infection  - seems superficial  - await drainage cultures and final BC results  - vancomycin alone for now    PAUL  - please obtain vancomycin trough just prior to tomorrow's dose  - monitor creatinine    Please call the ID service [phone] with questions or concerns over the weekend

## 2022-05-15 NOTE — PROGRESS NOTE ADULT - ATTENDING COMMENTS
89y old  Female with a PMH of HTN, HLD, fibromyalgia, spinal stenosis on chronic pain medications, Afib on Eliquis,  s/p Medtronic PPM on 3/24 Afib with TBS, CVA, CAD s/p WAGNER to LAD in 12/21 at [facility], hypothyroidism, anxiety, sent in from EP clinic for PPM site infection. Patient went to EP clinic for wound check and noted to have erythema and discharge from pacemaker site. Of note, she was treated with PO antibiotic Cefadroxil on 4/11 for ppm site ppx. Pt. denies trauma to the site, pain, CP, palpitation, SOB, fever or chills. PPM site infection-- Will have PPM extraction and reimplantation after clear from systemic infection. The process of the procedures along with the risks and benefits for each procedure were explained in detail which included but not limited to bleeding requiring transfusion, infection, stroke, plural effusion, esophageal injury, pericardial effusion, cardiac tamponade requiring chest tube, intubation, and death. Patient expressed understanding and all questions were answered. Patient is agreeable and consented. Tentative schedule on 5/16 Monday for PPM extraction with implantation ( after pt. is clear with no systemic infection)  - NPO after Sunday MN, AM labs and T&S , COVID swab  - Blood Ctx and wound Ctx X 2 sent, broad spectrum ABX Vanco and Cefepime started, ID consult appreciated.   - Continue Eliquis for thromboembolic ppx  given that patient has a [id number] score of 7 and hold Sunday 5/15 PM dose and 5/16 AM dose for anticipated PPM extraction
89y old  Female with a PMH of HTN, HLD, fibromyalgia, spinal stenosis on chronic pain medications, Afib on Eliquis,  s/p Medtronic PPM on 3/24 Afib with TBS, CVA, CAD s/p WAGNER to LAD in 12/21 at [facility], hypothyroidism, anxiety, sent in from EP clinic for PPM site infection.  Ready for pacemaker removal tomorrow.  Culture so far negative.

## 2022-05-16 PROBLEM — F41.9 ANXIETY DISORDER, UNSPECIFIED: Chronic | Status: ACTIVE | Noted: 2022-05-13

## 2022-05-16 LAB
ANION GAP SERPL CALC-SCNC: 11 MMOL/L — SIGNIFICANT CHANGE UP (ref 7–14)
APTT BLD: 34.8 SEC — SIGNIFICANT CHANGE UP (ref 27–36.3)
BLD GP AB SCN SERPL QL: NEGATIVE — SIGNIFICANT CHANGE UP
BUN SERPL-MCNC: 16 MG/DL — SIGNIFICANT CHANGE UP (ref 7–23)
CALCIUM SERPL-MCNC: 9.4 MG/DL — SIGNIFICANT CHANGE UP (ref 8.4–10.5)
CHLORIDE SERPL-SCNC: 100 MMOL/L — SIGNIFICANT CHANGE UP (ref 98–107)
CO2 SERPL-SCNC: 22 MMOL/L — SIGNIFICANT CHANGE UP (ref 22–31)
CREAT SERPL-MCNC: 0.8 MG/DL — SIGNIFICANT CHANGE UP (ref 0.5–1.3)
EGFR: 68 ML/MIN/1.73M2 — SIGNIFICANT CHANGE UP
GLUCOSE SERPL-MCNC: 94 MG/DL — SIGNIFICANT CHANGE UP (ref 70–99)
HCT VFR BLD CALC: 39 % — SIGNIFICANT CHANGE UP (ref 34.5–45)
HGB BLD-MCNC: 13.7 G/DL — SIGNIFICANT CHANGE UP (ref 11.5–15.5)
INR BLD: 1.12 RATIO — SIGNIFICANT CHANGE UP (ref 0.88–1.16)
MAGNESIUM SERPL-MCNC: 1.9 MG/DL — SIGNIFICANT CHANGE UP (ref 1.6–2.6)
MCHC RBC-ENTMCNC: 30.4 PG — SIGNIFICANT CHANGE UP (ref 27–34)
MCHC RBC-ENTMCNC: 35.1 GM/DL — SIGNIFICANT CHANGE UP (ref 32–36)
MCV RBC AUTO: 86.5 FL — SIGNIFICANT CHANGE UP (ref 80–100)
NRBC # BLD: 0 /100 WBCS — SIGNIFICANT CHANGE UP
NRBC # FLD: 0 K/UL — SIGNIFICANT CHANGE UP
PHOSPHATE SERPL-MCNC: 3.8 MG/DL — SIGNIFICANT CHANGE UP (ref 2.5–4.5)
PLATELET # BLD AUTO: 162 K/UL — SIGNIFICANT CHANGE UP (ref 150–400)
POTASSIUM SERPL-MCNC: 3.6 MMOL/L — SIGNIFICANT CHANGE UP (ref 3.5–5.3)
POTASSIUM SERPL-SCNC: 3.6 MMOL/L — SIGNIFICANT CHANGE UP (ref 3.5–5.3)
PROTHROM AB SERPL-ACNC: 13 SEC — SIGNIFICANT CHANGE UP (ref 10.5–13.4)
RBC # BLD: 4.51 M/UL — SIGNIFICANT CHANGE UP (ref 3.8–5.2)
RBC # FLD: 13.2 % — SIGNIFICANT CHANGE UP (ref 10.3–14.5)
RH IG SCN BLD-IMP: POSITIVE — SIGNIFICANT CHANGE UP
SODIUM SERPL-SCNC: 133 MMOL/L — LOW (ref 135–145)
WBC # BLD: 4.92 K/UL — SIGNIFICANT CHANGE UP (ref 3.8–10.5)
WBC # FLD AUTO: 4.92 K/UL — SIGNIFICANT CHANGE UP (ref 3.8–10.5)

## 2022-05-16 PROCEDURE — 99232 SBSQ HOSP IP/OBS MODERATE 35: CPT

## 2022-05-16 PROCEDURE — 33233 REMOVAL OF PM GENERATOR: CPT

## 2022-05-16 PROCEDURE — 99233 SBSQ HOSP IP/OBS HIGH 50: CPT

## 2022-05-16 PROCEDURE — 33235 REMOVAL PACEMAKER ELECTRODE: CPT

## 2022-05-16 PROCEDURE — 93010 ELECTROCARDIOGRAM REPORT: CPT

## 2022-05-16 RX ORDER — POTASSIUM CHLORIDE 20 MEQ
40 PACKET (EA) ORAL ONCE
Refills: 0 | Status: COMPLETED | OUTPATIENT
Start: 2022-05-16 | End: 2022-05-16

## 2022-05-16 RX ORDER — VANCOMYCIN HCL 1 G
1000 VIAL (EA) INTRAVENOUS EVERY 24 HOURS
Refills: 0 | Status: DISCONTINUED | OUTPATIENT
Start: 2022-05-16 | End: 2022-05-20

## 2022-05-16 RX ADMIN — Medication 250 MILLIGRAM(S): at 19:59

## 2022-05-16 RX ADMIN — OXYCODONE HYDROCHLORIDE 5 MILLIGRAM(S): 5 TABLET ORAL at 05:55

## 2022-05-16 RX ADMIN — PANTOPRAZOLE SODIUM 40 MILLIGRAM(S): 20 TABLET, DELAYED RELEASE ORAL at 04:58

## 2022-05-16 RX ADMIN — Medication 100 MILLIGRAM(S): at 04:58

## 2022-05-16 RX ADMIN — OXYCODONE HYDROCHLORIDE 5 MILLIGRAM(S): 5 TABLET ORAL at 19:13

## 2022-05-16 RX ADMIN — Medication 112 MICROGRAM(S): at 04:58

## 2022-05-16 RX ADMIN — OXYCODONE HYDROCHLORIDE 5 MILLIGRAM(S): 5 TABLET ORAL at 04:59

## 2022-05-16 RX ADMIN — OXYCODONE HYDROCHLORIDE 5 MILLIGRAM(S): 5 TABLET ORAL at 20:13

## 2022-05-16 RX ADMIN — Medication 100 MILLIGRAM(S): at 19:13

## 2022-05-16 RX ADMIN — Medication 2.5 MILLIGRAM(S): at 19:59

## 2022-05-16 NOTE — PROGRESS NOTE ADULT - ASSESSMENT
89F anxiety/depression, CAD, HTN, chol, CLL (remission), tachy/giselle s/p PPM placement (March 2022).  Referred to the ER after being seen by EP.  PPM site noted to be erythematous with drainage.  No fever.  No leukocytosis.  BC and drainage culture sent.  ID asked to help management.      PPM infection  - seems superficial  - wound cultures negative  - BC to date negative at 48H  - okay from ID standpoint to remove device  - for new device on R side beneath muscle  - vancomycin alone for now, increase to 1g daily    PAUL  - better now  - monitor creatinine    I have discussed plan of care as detailed above with EP NP

## 2022-05-16 NOTE — PROGRESS NOTE ADULT - PROBLEM SELECTOR PLAN 1
- Patient with erythema and drainage at PPM site; PPM placement 3/24 by Dr. Jade   - s/p PPM extraction today  - Blood Ctx and wound Ctx X 2 -NGTD  - IV Vanco as per ID  - Hold Eliquis. f/u EP re: when to resume after procedure   - Monitor CBC and fevers

## 2022-05-16 NOTE — PROGRESS NOTE ADULT - ASSESSMENT
89y old  Female with a PMH of HTN, HLD, fibromyalgia, spinal stenosis on chronic pain medications, Afib on Eliquis,  s/p Medtronic PPM on 3/24 Afib with TBS, CVA, CAD s/p WAGNER to LAD in 12/21 at [facility], hypothyroidism, anxiety, sent in from EP clinic for PPM site infection.      -NPO on 5/16 for PPM extraction with implantation   - Blood Ctx and wound Ctx X 2 negative, broad spectrum ABX Vanco started, ID consult appreciated  - Continuous telemetric monitoring  - Monitor electrolytes and replete K to 4 and Mg to 2  - Eliquis on hold for PPM extraction and will resume after the precedure    89y old  Female with a PMH of HTN, HLD, fibromyalgia, spinal stenosis on chronic pain medications, Afib on Eliquis,  s/p Medtronic PPM by Dr. Jade on 3/24 Afib with TBS, CVA, CAD s/p WAGNER to LAD in 12/21 at [facility], hypothyroidism, anxiety, sent in from EP clinic for PPM site infection.      -NPO on 5/16 for PPM extraction with implantation   - Blood Ctx and wound Ctx X 2 negative, broad spectrum ABX Vanco started, ID consult appreciated  - Continuous telemetric monitoring  - Monitor electrolytes and replete K to 4 and Mg to 2  - Eliquis on hold for PPM extraction and will resume after the precedure

## 2022-05-16 NOTE — PROGRESS NOTE ADULT - ASSESSMENT
89 y/o F PMH PPM placement (March 2022), HTN, HLD, fibromyalgia, CLL (in remission), spinal stenosis, hypothyroidism, cataracts, hysterectomy, anxiety/depression, CAD presents to the ED s/p appointment with Dr. Jade with PPM site erythema and drainage admitted to hospital for IV abx and PPM removal.

## 2022-05-17 LAB
ANION GAP SERPL CALC-SCNC: 11 MMOL/L — SIGNIFICANT CHANGE UP (ref 7–14)
BASOPHILS # BLD AUTO: 0.01 K/UL — SIGNIFICANT CHANGE UP (ref 0–0.2)
BASOPHILS NFR BLD AUTO: 0.1 % — SIGNIFICANT CHANGE UP (ref 0–2)
BUN SERPL-MCNC: 20 MG/DL — SIGNIFICANT CHANGE UP (ref 7–23)
CALCIUM SERPL-MCNC: 9.4 MG/DL — SIGNIFICANT CHANGE UP (ref 8.4–10.5)
CHLORIDE SERPL-SCNC: 99 MMOL/L — SIGNIFICANT CHANGE UP (ref 98–107)
CO2 SERPL-SCNC: 20 MMOL/L — LOW (ref 22–31)
CREAT SERPL-MCNC: 0.9 MG/DL — SIGNIFICANT CHANGE UP (ref 0.5–1.3)
EGFR: 59 ML/MIN/1.73M2 — LOW
EOSINOPHIL # BLD AUTO: 0 K/UL — SIGNIFICANT CHANGE UP (ref 0–0.5)
EOSINOPHIL NFR BLD AUTO: 0 % — SIGNIFICANT CHANGE UP (ref 0–6)
GLUCOSE SERPL-MCNC: 223 MG/DL — HIGH (ref 70–99)
HCT VFR BLD CALC: 41.5 % — SIGNIFICANT CHANGE UP (ref 34.5–45)
HGB BLD-MCNC: 14.5 G/DL — SIGNIFICANT CHANGE UP (ref 11.5–15.5)
IANC: 7.04 K/UL — SIGNIFICANT CHANGE UP (ref 1.8–7.4)
IMM GRANULOCYTES NFR BLD AUTO: 0.5 % — SIGNIFICANT CHANGE UP (ref 0–1.5)
LYMPHOCYTES # BLD AUTO: 0.95 K/UL — LOW (ref 1–3.3)
LYMPHOCYTES # BLD AUTO: 11.2 % — LOW (ref 13–44)
MAGNESIUM SERPL-MCNC: 1.9 MG/DL — SIGNIFICANT CHANGE UP (ref 1.6–2.6)
MCHC RBC-ENTMCNC: 30.5 PG — SIGNIFICANT CHANGE UP (ref 27–34)
MCHC RBC-ENTMCNC: 34.9 GM/DL — SIGNIFICANT CHANGE UP (ref 32–36)
MCV RBC AUTO: 87.2 FL — SIGNIFICANT CHANGE UP (ref 80–100)
MONOCYTES # BLD AUTO: 0.45 K/UL — SIGNIFICANT CHANGE UP (ref 0–0.9)
MONOCYTES NFR BLD AUTO: 5.3 % — SIGNIFICANT CHANGE UP (ref 2–14)
NEUTROPHILS # BLD AUTO: 7.04 K/UL — SIGNIFICANT CHANGE UP (ref 1.8–7.4)
NEUTROPHILS NFR BLD AUTO: 82.9 % — HIGH (ref 43–77)
NRBC # BLD: 0 /100 WBCS — SIGNIFICANT CHANGE UP
NRBC # FLD: 0 K/UL — SIGNIFICANT CHANGE UP
PHOSPHATE SERPL-MCNC: 3.9 MG/DL — SIGNIFICANT CHANGE UP (ref 2.5–4.5)
PLATELET # BLD AUTO: 208 K/UL — SIGNIFICANT CHANGE UP (ref 150–400)
POTASSIUM SERPL-MCNC: 4.3 MMOL/L — SIGNIFICANT CHANGE UP (ref 3.5–5.3)
POTASSIUM SERPL-SCNC: 4.3 MMOL/L — SIGNIFICANT CHANGE UP (ref 3.5–5.3)
RBC # BLD: 4.76 M/UL — SIGNIFICANT CHANGE UP (ref 3.8–5.2)
RBC # FLD: 13 % — SIGNIFICANT CHANGE UP (ref 10.3–14.5)
SODIUM SERPL-SCNC: 130 MMOL/L — LOW (ref 135–145)
WBC # BLD: 8.49 K/UL — SIGNIFICANT CHANGE UP (ref 3.8–10.5)
WBC # FLD AUTO: 8.49 K/UL — SIGNIFICANT CHANGE UP (ref 3.8–10.5)

## 2022-05-17 PROCEDURE — 99233 SBSQ HOSP IP/OBS HIGH 50: CPT

## 2022-05-17 PROCEDURE — 99232 SBSQ HOSP IP/OBS MODERATE 35: CPT

## 2022-05-17 RX ORDER — SODIUM CHLORIDE 9 MG/ML
1000 INJECTION INTRAMUSCULAR; INTRAVENOUS; SUBCUTANEOUS
Refills: 0 | Status: DISCONTINUED | OUTPATIENT
Start: 2022-05-17 | End: 2022-05-20

## 2022-05-17 RX ADMIN — Medication 250 MILLIGRAM(S): at 20:21

## 2022-05-17 RX ADMIN — Medication 112 MICROGRAM(S): at 04:55

## 2022-05-17 RX ADMIN — OXYCODONE HYDROCHLORIDE 5 MILLIGRAM(S): 5 TABLET ORAL at 19:01

## 2022-05-17 RX ADMIN — OXYCODONE HYDROCHLORIDE 5 MILLIGRAM(S): 5 TABLET ORAL at 05:55

## 2022-05-17 RX ADMIN — OXYCODONE HYDROCHLORIDE 5 MILLIGRAM(S): 5 TABLET ORAL at 04:55

## 2022-05-17 RX ADMIN — PANTOPRAZOLE SODIUM 40 MILLIGRAM(S): 20 TABLET, DELAYED RELEASE ORAL at 04:55

## 2022-05-17 RX ADMIN — CLOPIDOGREL BISULFATE 75 MILLIGRAM(S): 75 TABLET, FILM COATED ORAL at 12:15

## 2022-05-17 RX ADMIN — OXYCODONE HYDROCHLORIDE 5 MILLIGRAM(S): 5 TABLET ORAL at 12:15

## 2022-05-17 RX ADMIN — Medication 100 MILLIGRAM(S): at 04:55

## 2022-05-17 RX ADMIN — OXYCODONE HYDROCHLORIDE 5 MILLIGRAM(S): 5 TABLET ORAL at 18:31

## 2022-05-17 RX ADMIN — Medication 100 MILLIGRAM(S): at 18:31

## 2022-05-17 RX ADMIN — Medication 2.5 MILLIGRAM(S): at 21:15

## 2022-05-17 RX ADMIN — Medication 10 MILLIGRAM(S): at 20:22

## 2022-05-17 RX ADMIN — OXYCODONE HYDROCHLORIDE 5 MILLIGRAM(S): 5 TABLET ORAL at 13:15

## 2022-05-17 RX ADMIN — SODIUM CHLORIDE 75 MILLILITER(S): 9 INJECTION INTRAMUSCULAR; INTRAVENOUS; SUBCUTANEOUS at 20:21

## 2022-05-17 NOTE — PHYSICAL THERAPY INITIAL EVALUATION ADULT - ADDITIONAL COMMENTS
Patient lives in an apartment alone with +10 steps to negotiate in the front of her building, and no steps to negotiate in the back of her building, and elevator access. Patient uses a rollator for functional mobility at baseline however states she does not need it. Independent with all ADLs.     Patient left semi-supine in bed, all lines intact, call bell within reach, in NAD. RN aware.

## 2022-05-17 NOTE — PHYSICAL THERAPY INITIAL EVALUATION ADULT - PERTINENT HX OF CURRENT PROBLEM, REHAB EVAL
89 year old Female PMH PPM placement (March 2022), HTN, HLD, fibromyalgia, CLL (in remission), spinal stenosis, hypothyroidism, cataracts, hysterectomy, anxiety/depression, CAD presents to the ED s/p appointment with Dr. Jade with PPM site erythema and drainage admitted to hospital for IV abx and PPM removal.

## 2022-05-17 NOTE — PHYSICAL THERAPY INITIAL EVALUATION ADULT - GENERAL OBSERVATIONS, REHAB EVAL
Patient received semi-supine in bed, +cardiac monitor, A+Ox4, comfortable and in NAD. Pt agreeable to participate in PT evaluation.

## 2022-05-17 NOTE — PROGRESS NOTE ADULT - ASSESSMENT
89y old  Female with a PMH of HTN, HLD, fibromyalgia, spinal stenosis on chronic pain medications, Afib on Eliquis,  s/p Medtronic PPM by Dr. Jade on 3/24 Afib with TBS, CVA, CAD s/p WAGNER to LAD in 12/21 at [facility], hypothyroidism, anxiety, sent in from EP clinic for PPM site infection.      - Plan for Micra implantation tomorrow on 5/18  - NPO after MN, AM labs  - Continue Vanco, ID consult appreciated and cleared for PM implantation   - Continuous telemetric monitoring  - Monitor electrolytes and replete K to 4 and Mg to 2

## 2022-05-17 NOTE — PHYSICAL THERAPY INITIAL EVALUATION ADULT - BALANCE DISTURBANCE, SYSTEM IMPAIRMENT CONTRIBUTE, REHAB EVAL
Male Completion Statement: After discussing his treatment course we decided to discontinue isotretinoin therapy at this time. He shouldn't donate blood for one month after the last dose. He should call with any new symptoms of depression. musculoskeletal

## 2022-05-17 NOTE — PROGRESS NOTE ADULT - ASSESSMENT
89F anxiety/depression, CAD, HTN, chol, CLL (remission), tachy/giselle s/p PPM placement (March 2022).  Referred to the ER after being seen by EP.  PPM site noted to be erythematous with drainage.  No fever.  No leukocytosis.  BC and drainage culture sent.  ID asked to help management.      PPM infection  - superficial infection s/p device explant  - wound cultures negative  - BC remain negative  - for micra  - okay from ID standpoint to place (planned for tomorrow)  - vancomycin 1g daily until 5/19, then d/c    PAUL  - better now  - monitor creatinine

## 2022-05-17 NOTE — PROGRESS NOTE ADULT - PROBLEM SELECTOR PLAN 1
- Patient with erythema and drainage at PPM site; PPM placement 3/24 by Dr. Jade   - s/p PPM extraction 5/16. plan for micra PPM placement 5/18  - Blood Ctx and wound Ctx X 2 -NGTD  - IV Vanco as per ID, last dose 5/19   - Hold Eliquis. f/u EP re: when to resume after procedure   - Monitor CBC and fevers

## 2022-05-18 ENCOUNTER — TRANSCRIPTION ENCOUNTER (OUTPATIENT)
Age: 87
End: 2022-05-18

## 2022-05-18 LAB
ANION GAP SERPL CALC-SCNC: 11 MMOL/L — SIGNIFICANT CHANGE UP (ref 7–14)
APTT BLD: 31.4 SEC — SIGNIFICANT CHANGE UP (ref 27–36.3)
BUN SERPL-MCNC: 28 MG/DL — HIGH (ref 7–23)
CALCIUM SERPL-MCNC: 9.4 MG/DL — SIGNIFICANT CHANGE UP (ref 8.4–10.5)
CHLORIDE SERPL-SCNC: 102 MMOL/L — SIGNIFICANT CHANGE UP (ref 98–107)
CO2 SERPL-SCNC: 22 MMOL/L — SIGNIFICANT CHANGE UP (ref 22–31)
CREAT SERPL-MCNC: 0.9 MG/DL — SIGNIFICANT CHANGE UP (ref 0.5–1.3)
EGFR: 59 ML/MIN/1.73M2 — LOW
GLUCOSE SERPL-MCNC: 99 MG/DL — SIGNIFICANT CHANGE UP (ref 70–99)
HCT VFR BLD CALC: 38.9 % — SIGNIFICANT CHANGE UP (ref 34.5–45)
HGB BLD-MCNC: 13.3 G/DL — SIGNIFICANT CHANGE UP (ref 11.5–15.5)
INR BLD: 1.08 RATIO — SIGNIFICANT CHANGE UP (ref 0.88–1.16)
MAGNESIUM SERPL-MCNC: 2 MG/DL — SIGNIFICANT CHANGE UP (ref 1.6–2.6)
MCHC RBC-ENTMCNC: 30.6 PG — SIGNIFICANT CHANGE UP (ref 27–34)
MCHC RBC-ENTMCNC: 34.2 GM/DL — SIGNIFICANT CHANGE UP (ref 32–36)
MCV RBC AUTO: 89.4 FL — SIGNIFICANT CHANGE UP (ref 80–100)
NRBC # BLD: 0 /100 WBCS — SIGNIFICANT CHANGE UP
NRBC # FLD: 0 K/UL — SIGNIFICANT CHANGE UP
PHOSPHATE SERPL-MCNC: 3.2 MG/DL — SIGNIFICANT CHANGE UP (ref 2.5–4.5)
PLATELET # BLD AUTO: 158 K/UL — SIGNIFICANT CHANGE UP (ref 150–400)
POTASSIUM SERPL-MCNC: 4.1 MMOL/L — SIGNIFICANT CHANGE UP (ref 3.5–5.3)
POTASSIUM SERPL-SCNC: 4.1 MMOL/L — SIGNIFICANT CHANGE UP (ref 3.5–5.3)
PROTHROM AB SERPL-ACNC: 12.5 SEC — SIGNIFICANT CHANGE UP (ref 10.5–13.4)
RBC # BLD: 4.35 M/UL — SIGNIFICANT CHANGE UP (ref 3.8–5.2)
RBC # FLD: 13.4 % — SIGNIFICANT CHANGE UP (ref 10.3–14.5)
SODIUM SERPL-SCNC: 135 MMOL/L — SIGNIFICANT CHANGE UP (ref 135–145)
WBC # BLD: 7.34 K/UL — SIGNIFICANT CHANGE UP (ref 3.8–10.5)
WBC # FLD AUTO: 7.34 K/UL — SIGNIFICANT CHANGE UP (ref 3.8–10.5)

## 2022-05-18 PROCEDURE — 99232 SBSQ HOSP IP/OBS MODERATE 35: CPT

## 2022-05-18 PROCEDURE — 93010 ELECTROCARDIOGRAM REPORT: CPT

## 2022-05-18 PROCEDURE — 99233 SBSQ HOSP IP/OBS HIGH 50: CPT

## 2022-05-18 PROCEDURE — 33274 TCAT INSJ/RPL PERM LDLS PM: CPT

## 2022-05-18 RX ADMIN — Medication 100 MILLIGRAM(S): at 05:20

## 2022-05-18 RX ADMIN — Medication 3 MILLIGRAM(S): at 23:55

## 2022-05-18 RX ADMIN — CLOPIDOGREL BISULFATE 75 MILLIGRAM(S): 75 TABLET, FILM COATED ORAL at 17:19

## 2022-05-18 RX ADMIN — Medication 650 MILLIGRAM(S): at 18:31

## 2022-05-18 RX ADMIN — OXYCODONE HYDROCHLORIDE 5 MILLIGRAM(S): 5 TABLET ORAL at 23:34

## 2022-05-18 RX ADMIN — OXYCODONE HYDROCHLORIDE 5 MILLIGRAM(S): 5 TABLET ORAL at 01:24

## 2022-05-18 RX ADMIN — Medication 112 MICROGRAM(S): at 05:20

## 2022-05-18 RX ADMIN — Medication 650 MILLIGRAM(S): at 19:00

## 2022-05-18 RX ADMIN — Medication 650 MILLIGRAM(S): at 11:03

## 2022-05-18 RX ADMIN — OXYCODONE HYDROCHLORIDE 5 MILLIGRAM(S): 5 TABLET ORAL at 18:18

## 2022-05-18 RX ADMIN — Medication 1 ENEMA: at 00:08

## 2022-05-18 RX ADMIN — OXYCODONE HYDROCHLORIDE 5 MILLIGRAM(S): 5 TABLET ORAL at 09:10

## 2022-05-18 RX ADMIN — Medication 250 MILLIGRAM(S): at 19:10

## 2022-05-18 RX ADMIN — Medication 650 MILLIGRAM(S): at 10:14

## 2022-05-18 RX ADMIN — Medication 2.5 MILLIGRAM(S): at 20:51

## 2022-05-18 RX ADMIN — PANTOPRAZOLE SODIUM 40 MILLIGRAM(S): 20 TABLET, DELAYED RELEASE ORAL at 05:20

## 2022-05-18 RX ADMIN — OXYCODONE HYDROCHLORIDE 5 MILLIGRAM(S): 5 TABLET ORAL at 00:54

## 2022-05-18 RX ADMIN — Medication 100 MILLIGRAM(S): at 18:37

## 2022-05-18 RX ADMIN — OXYCODONE HYDROCHLORIDE 5 MILLIGRAM(S): 5 TABLET ORAL at 10:10

## 2022-05-18 RX ADMIN — OXYCODONE HYDROCHLORIDE 5 MILLIGRAM(S): 5 TABLET ORAL at 17:18

## 2022-05-18 NOTE — DISCHARGE NOTE PROVIDER - HOSPITAL COURSE
89 y/o F PMH PPM placement (March 2022), HTN, HLD, fibromyalgia, CLL (in remission), spinal stenosis, hypothyroidism, cataracts, hysterectomy, anxiety/depression, CAD presents to the ED s/p appointment with Dr. Jade with PPM site erythema and drainage admitted to hospital for IV abx and PPM removal.     Infection of pacemaker pulse generator site.   - Patient with erythema and drainage at PPM site; PPM placement 3/24 by Dr. Jade   - s/p PPM extraction 5/16. plan for micra PPM placement 5/18 ???????????????????????  - treated with IV Vanco last dose 5/19   - Hold Eliquis. f/u EP re: when to resume after procedure????????????????????????????       Cervical spinal stenosis.    - Complains of severe back/neck pain with R sided paresthesias. States pain and paresthesias are positional.  - Tylenol 650 mg q 4 hours for mild pain and oxycodone 5 mg q 6 hours for severe pain   - Patient states she will follow up with outpatient neurologist once out of hospital.    Hypothyroid.    continue with synthroid      Anxiety.   - Takes valium prn, will c/w.     Coronary artery disease.   - Continue with Eliquis 5 mg BID (on hold for procedure) , plavix 75 mg qd and metoprolol 100 mg BID     alcohol use.   - Admits to 2-3 "cocktails" per night; last drink last night 5/13  -no e/o withdrawal. d/c CAESAR.           89 y/o F PMH PPM placement (March 2022), HTN, HLD, fibromyalgia, CLL (in remission), spinal stenosis, hypothyroidism, cataracts, hysterectomy, anxiety/depression, CAD presents to the ED s/p appointment with Dr. Jade with PPM site erythema and drainage admitted to hospital for IV abx and PPM removal.     Infection of pacemaker pulse generator site.   - Patient with erythema and drainage at PPM site; PPM placement 3/24 by Dr. Jade   - s/p PPM extraction 5/16, micra PPM placement 5/18   - treated with IV Vanco last dose 5/19   - Hold Eliquis. f/u EP re: when to resume after procedure????????????????????????????     Cervical spinal stenosis.    - Complains of severe back/neck pain with R sided paresthesias. States pain and paresthesias are positional.  - Tylenol 650 mg q 4 hours for mild pain and oxycodone 5 mg q 6 hours for severe pain   - Patient states she will follow up with outpatient neurologist once out of hospital.    Hypothyroid.    continue with synthroid      Anxiety.   - Takes valium prn, will c/w.     Coronary artery disease.   - Continue with Eliquis 5 mg BID (on hold for procedure) , plavix 75 mg qd and metoprolol 100 mg BID     alcohol use.   - Admits to 2-3 "cocktails" per night; last drink last night 5/13  -no e/o withdrawal. d/c CIWA.           89 y/o F PMH PPM placement (March 2022), HTN, HLD, fibromyalgia, CLL (in remission), spinal stenosis, hypothyroidism, cataracts, hysterectomy, anxiety/depression, CAD presents to the ED s/p appointment with Dr. Jade with PPM site erythema and drainage admitted to hospital for IV abx and PPM removal.     Infection of pacemaker pulse generator site.   - Patient with erythema and drainage at PPM site; PPM placement 3/24 by Dr. Jade   - s/p PPM extraction 5/16, micra PPM placement 5/18   - treated with IV Vanco last dose 5/19   - Eliquis resumed 5/20 as per ED 2.5 BID    Cervical spinal stenosis.    - Complains of severe back/neck pain with R sided paresthesias. States pain and paresthesias are positional.  - Tylenol 650 mg q 4 hours for mild pain and oxycodone 5 mg q 6 hours for severe pain  - Patient states she will follow up with outpatient neurologist once out of hospital.    Hypothyroid.    continue with synthroid      Anxiety.   - Takes valium prn, will c/w.     Coronary artery disease.   - Continue with Eliquis 5 mg BID (on hold for procedure) , plavix 75 mg qd and metoprolol 100 mg BID     alcohol use.   - Admits to 2-3 "cocktails" per night; last drink last night 5/13  -no e/o withdrawal. d/c JOANNAWA.

## 2022-05-18 NOTE — DISCHARGE NOTE PROVIDER - NSDCCPCAREPLAN_GEN_ALL_CORE_FT
PRINCIPAL DISCHARGE DIAGNOSIS  Diagnosis: Infected pacemaker  Assessment and Plan of Treatment: You had an infection in your pacemaker, it was removed and replaced with another pacemaker.  You have to follow up with the electrophysiologist:   -You are scheduled for an appointment on 5/31/22 @ 10am  -No scrubbing the groin site  -No lifting more than 5lbs or exertional exercising such as jogging, running, bike riding for 7 days  -No swimming pool, Jacuzzi, or bath for 5 days. Patient can shower 24 hours after procedure    -Please call [phone] if the following occurs:      - fever with temperature > 100.6      - swelling, drainage or bleeding at the site incision      SECONDARY DISCHARGE DIAGNOSES  Diagnosis: Hypothyroid  Assessment and Plan of Treatment: Please continue your home medications and follow up with your primary care doctor.    Diagnosis: Anxiety  Assessment and Plan of Treatment: Please continue your home medications and follow up with your primary doctor for further management.    Diagnosis: Coronary artery disease  Assessment and Plan of Treatment: Please continue your home regimine and continue to follow up with your primary care doctor.    Diagnosis: Heavy alcohol use  Assessment and Plan of Treatment: You admitted to drinking 2-3 drinks per night.  It is always reconmended that your avoid alcohol intake.    Diagnosis: Cervical spinal stenosis  Assessment and Plan of Treatment: Please continue you home medications and follow up with your primary doctor     PRINCIPAL DISCHARGE DIAGNOSIS  Diagnosis: Infected pacemaker  Assessment and Plan of Treatment: You had an infection in your pacemaker, it was removed and replaced with another pacemaker.  You have to follow up with the electrophysiologist:   -You are scheduled for an appointment on 5/31/22 @ 10am  -No scrubbing the groin site  -No lifting more than 5lbs or exertional exercising such as jogging, running, bike riding for 7 days  -No swimming pool, Jacuzzi, or bath for 5 days. Patient can shower 24 hours after procedure    -Please call [phone] if the following occurs:      - fever with temperature > 100.6      - swelling, drainage or bleeding at the site incision      SECONDARY DISCHARGE DIAGNOSES  Diagnosis: Hypothyroid  Assessment and Plan of Treatment: Please continue your home medications and follow up with your primary care doctor.    Diagnosis: Anxiety  Assessment and Plan of Treatment: Please continue your home medications and follow up with your primary doctor for further management.    Diagnosis: Coronary artery disease  Assessment and Plan of Treatment: Please continue your home regimine and continue to follow up with your primary care doctor.    Diagnosis: Heavy alcohol use  Assessment and Plan of Treatment: You admitted to drinking 2-3 drinks per night.  It is always reconmended that your avoid alcohol intake.    Diagnosis: Cervical spinal stenosis  Assessment and Plan of Treatment: Please continue you home medications and follow up with your neurologist

## 2022-05-18 NOTE — PROGRESS NOTE ADULT - ASSESSMENT
89y old  Female with a PMH of HTN, HLD, fibromyalgia, spinal stenosis on chronic pain medications, Afib on Eliquis,  s/p Medtronic PPM by Dr. Jade on 3/24 Afib with TBS, CVA, CAD s/p WAGNER to LAD in 12/21 at [facility], hypothyroidism, anxiety, sent in from EP clinic for PPM site infection.      - Plan for Micra implantation today  - NPO  - Device/leads culture negative  - Continue Vanco, ID consult appreciated and cleared for PM implantation   - Continuous telemetric monitoring  - Monitor electrolytes and replete K to 4 and Mg to 2

## 2022-05-18 NOTE — DISCHARGE NOTE PROVIDER - NSDCMRMEDTOKEN_GEN_ALL_CORE_FT
Albuterol (Eqv-ProAir HFA) 90 mcg/inh inhalation aerosol: 2 puff(s) inhaled every 6 hours  docusate sodium 100 mg oral capsule: 1 cap(s) orally 2 times a day.    Hold for loose stools.   Eliquis 5 mg oral tablet: 1 tab(s) orally 2 times a day  metoprolol tartrate 100 mg oral tablet: 1 tab(s) orally 2 times a day   Multiple Vitamins oral tablet: 1 tab(s) orally once a day  pantoprazole 40 mg oral delayed release tablet: 1 tab(s) orally once a day  Percocet 5 mg-325 mg oral tablet: 1 tab(s) orally every 4 hours, As Needed  Plavix 75 mg oral tablet: 1 tab(s) orally once a day    polyethylene glycol 3350 oral powder for reconstitution: 17 gram(s) orally 2 times a day as needed for constipation  senna oral tablet: 2 tab(s) orally once a day (at bedtime).    Hold for loose stools.   Synthroid 112 mcg (0.112 mg) oral tablet: 1 tab(s) orally once a day  Tylenol with Codeine #4 oral tablet: 1 tab(s) orally every 6 hours, As Needed  Valium 5 mg oral tablet: 1 tab(s) orally 3 times a day, As Needed   Albuterol (Eqv-ProAir HFA) 90 mcg/inh inhalation aerosol: 2 puff(s) inhaled every 6 hours  docusate sodium 100 mg oral capsule: 1 cap(s) orally 2 times a day.    Hold for loose stools.   Eliquis 5 mg oral tablet: 1 tab(s) orally 2 times a day  metoprolol tartrate 100 mg oral tablet: 1 tab(s) orally 2 times a day   Multiple Vitamins oral tablet: 1 tab(s) orally once a day  oxyCODONE 5 mg oral tablet: 1 tab(s) orally every 6 hours, As needed, Moderate Pain (4 - 6)  pantoprazole 40 mg oral delayed release tablet: 1 tab(s) orally once a day  Plavix 75 mg oral tablet: 1 tab(s) orally once a day    polyethylene glycol 3350 oral powder for reconstitution: 17 gram(s) orally 2 times a day as needed for constipation  senna oral tablet: 2 tab(s) orally once a day (at bedtime).    Hold for loose stools.   Synthroid 112 mcg (0.112 mg) oral tablet: 1 tab(s) orally once a day  Valium 5 mg oral tablet: 1 tab(s) orally 3 times a day, As Needed   Albuterol (Eqv-ProAir HFA) 90 mcg/inh inhalation aerosol: 2 puff(s) inhaled every 6 hours  apixaban 2.5 mg oral tablet: 1 tab(s) orally every 12 hours  docusate sodium 100 mg oral capsule: 1 cap(s) orally 2 times a day.    Hold for loose stools.   metoprolol tartrate 100 mg oral tablet: 1 tab(s) orally 2 times a day   Multiple Vitamins oral tablet: 1 tab(s) orally once a day  oxyCODONE 5 mg oral tablet: 1 tab(s) orally every 6 hours, As needed MDD: 4mg  pantoprazole 40 mg oral delayed release tablet: 1 tab(s) orally once a day  Plavix 75 mg oral tablet: 1 tab(s) orally once a day    polyethylene glycol 3350 oral powder for reconstitution: 17 gram(s) orally 2 times a day as needed for constipation  senna oral tablet: 2 tab(s) orally once a day (at bedtime).    Hold for loose stools.   Synthroid 112 mcg (0.112 mg) oral tablet: 1 tab(s) orally once a day  Valium 5 mg oral tablet: 1 tab(s) orally 3 times a day, As Needed

## 2022-05-18 NOTE — DISCHARGE NOTE PROVIDER - NSDCFUSCHEDAPPT_GEN_ALL_CORE_FT
[facility] Physician [facility] [phone]  Scheduled Appointment: 05/31/2022

## 2022-05-18 NOTE — DISCHARGE NOTE PROVIDER - PROVIDER TOKENS
Anesthesia Pain Management Service    SUBJECTIVE: Pt is primarily Mandarin speaking, [place]  ID [id number] used.  Patient had IV PCA without problems reported.  24 hour use of IV PCA: 0.8mg  Patient had Flexible bronchoscopy, left robotic assisted VATS, pneumolysis, left upper lobectomy, partial pleurectomy and mediastinal lymph node dissection on  8/25/20    Therapy:	  [ X] IV PCA	   [ ] Epidural           [ ] s/p Spinal Opoid              [ ] Postpartum infusion	  [ ] Patient controlled regional anesthesia (PCRA)    [ ] prn Analgesics    Allergies    fish (Other (Mild))  No Known Drug Allergies  shellfish (Other (Mild))    Intolerances    MEDICATIONS  (STANDING):  acetaminophen  IVPB .. 1000 milliGRAM(s) IV Intermittent once  acetaminophen  IVPB .. 1000 milliGRAM(s) IV Intermittent once  acetaminophen  IVPB .. 1000 milliGRAM(s) IV Intermittent once  acetaminophen  IVPB .. 1000 milliGRAM(s) IV Intermittent once  atorvastatin 10 milliGRAM(s) Oral at bedtime  heparin   Injectable 5000 Unit(s) SubCutaneous every 8 hours  lactated ringers. 500 milliLiter(s) (30 mL/Hr) IV Continuous <Continuous>  pantoprazole    Tablet 40 milliGRAM(s) Oral before breakfast  polyethylene glycol 3350 17 Gram(s) Oral daily  senna 2 Tablet(s) Oral at bedtime  sodium chloride 3%  Inhalation 4 milliLiter(s) Inhalation every 6 hours  tamsulosin 0.4 milliGRAM(s) Oral at bedtime  timolol 0.5% Solution 1 Drop(s) Both EYES every 12 hours    MEDICATIONS  (PRN):  albuterol/ipratropium for Nebulization 3 milliLiter(s) Nebulizer every 6 hours PRN Shortness of Breath and/or Wheezing  naloxone Injectable 0.1 milliGRAM(s) IV Push every 3 minutes PRN For ANY of the following changes in patient status:  A. RR LESS THAN 10 breaths per minute, B. Oxygen saturation LESS THAN 90%, C. Sedation score of 6  ondansetron Injectable 4 milliGRAM(s) IV Push every 6 hours PRN Nausea      OBJECTIVE:   [X] No new signs     [ ] Other:    Side Effects:  [X ] None			[ ] Other:    Assessment of Catheter Site:		[ ] Intact		[ ] Other:    ASSESSMENT/PLAN  [ ] Continue current therapy    [X ] Therapy changed to:    [ ] IV PCA       [ ] Epidural     [ X] prn Analgesics     Comments:  CT team discontinued IV PCA and they ordered PRN IV non-opioid adjuvant analgesics to be used at this point. PROVIDER:[TOKEN:[[id number]]]

## 2022-05-18 NOTE — DISCHARGE NOTE PROVIDER - CARE PROVIDER_API CALL
Namrata Louie  Internal Medicine  [address], [address]  [place] [address]  Phone: [phone]  Fax: [phone]  Follow Up Time:

## 2022-05-18 NOTE — DISCHARGE NOTE PROVIDER - NSDCFUADDAPPT_GEN_ALL_CORE_FT
You have an appointment with the electrophysiologist on 5/31/2022    Please follow up with your primary care doctor. You have an appointment with the electrophysiologist on 5/31/2022 @ 10:00, The office is located on the 4th floor of the Oncology Building here at [facility]. [phone].     Please follow up with your primary care doctor.

## 2022-05-18 NOTE — DISCHARGE NOTE PROVIDER - NSDCFUADDINST_GEN_ALL_CORE_FT
Please follow up with your doctors, if you have any further symptoms please call your doctors or come to the hospital.

## 2022-05-19 LAB
ANION GAP SERPL CALC-SCNC: 12 MMOL/L — SIGNIFICANT CHANGE UP (ref 7–14)
BUN SERPL-MCNC: 27 MG/DL — HIGH (ref 7–23)
CALCIUM SERPL-MCNC: 9.2 MG/DL — SIGNIFICANT CHANGE UP (ref 8.4–10.5)
CHLORIDE SERPL-SCNC: 102 MMOL/L — SIGNIFICANT CHANGE UP (ref 98–107)
CO2 SERPL-SCNC: 21 MMOL/L — LOW (ref 22–31)
CREAT SERPL-MCNC: 0.78 MG/DL — SIGNIFICANT CHANGE UP (ref 0.5–1.3)
CULTURE RESULTS: SIGNIFICANT CHANGE UP
CULTURE RESULTS: SIGNIFICANT CHANGE UP
EGFR: 70 ML/MIN/1.73M2 — SIGNIFICANT CHANGE UP
GLUCOSE SERPL-MCNC: 94 MG/DL — SIGNIFICANT CHANGE UP (ref 70–99)
HCT VFR BLD CALC: 37 % — SIGNIFICANT CHANGE UP (ref 34.5–45)
HGB BLD-MCNC: 12.7 G/DL — SIGNIFICANT CHANGE UP (ref 11.5–15.5)
MAGNESIUM SERPL-MCNC: 1.9 MG/DL — SIGNIFICANT CHANGE UP (ref 1.6–2.6)
MCHC RBC-ENTMCNC: 30.5 PG — SIGNIFICANT CHANGE UP (ref 27–34)
MCHC RBC-ENTMCNC: 34.3 GM/DL — SIGNIFICANT CHANGE UP (ref 32–36)
MCV RBC AUTO: 88.7 FL — SIGNIFICANT CHANGE UP (ref 80–100)
NRBC # BLD: 0 /100 WBCS — SIGNIFICANT CHANGE UP
NRBC # FLD: 0 K/UL — SIGNIFICANT CHANGE UP
PHOSPHATE SERPL-MCNC: 3.3 MG/DL — SIGNIFICANT CHANGE UP (ref 2.5–4.5)
PLATELET # BLD AUTO: 161 K/UL — SIGNIFICANT CHANGE UP (ref 150–400)
POTASSIUM SERPL-MCNC: 4 MMOL/L — SIGNIFICANT CHANGE UP (ref 3.5–5.3)
POTASSIUM SERPL-SCNC: 4 MMOL/L — SIGNIFICANT CHANGE UP (ref 3.5–5.3)
RBC # BLD: 4.17 M/UL — SIGNIFICANT CHANGE UP (ref 3.8–5.2)
RBC # FLD: 13.4 % — SIGNIFICANT CHANGE UP (ref 10.3–14.5)
SODIUM SERPL-SCNC: 135 MMOL/L — SIGNIFICANT CHANGE UP (ref 135–145)
SPECIMEN SOURCE: SIGNIFICANT CHANGE UP
SPECIMEN SOURCE: SIGNIFICANT CHANGE UP
WBC # BLD: 6.97 K/UL — SIGNIFICANT CHANGE UP (ref 3.8–10.5)
WBC # FLD AUTO: 6.97 K/UL — SIGNIFICANT CHANGE UP (ref 3.8–10.5)

## 2022-05-19 PROCEDURE — 71045 X-RAY EXAM CHEST 1 VIEW: CPT | Mod: 26

## 2022-05-19 PROCEDURE — 99232 SBSQ HOSP IP/OBS MODERATE 35: CPT

## 2022-05-19 RX ORDER — OXYCODONE HYDROCHLORIDE 5 MG/1
5 TABLET ORAL EVERY 6 HOURS
Refills: 0 | Status: DISCONTINUED | OUTPATIENT
Start: 2022-05-19 | End: 2022-05-20

## 2022-05-19 RX ORDER — DIAZEPAM 5 MG
2.5 TABLET ORAL EVERY 12 HOURS
Refills: 0 | Status: DISCONTINUED | OUTPATIENT
Start: 2022-05-19 | End: 2022-05-20

## 2022-05-19 RX ADMIN — OXYCODONE HYDROCHLORIDE 5 MILLIGRAM(S): 5 TABLET ORAL at 11:40

## 2022-05-19 RX ADMIN — Medication 650 MILLIGRAM(S): at 17:44

## 2022-05-19 RX ADMIN — Medication 112 MICROGRAM(S): at 05:33

## 2022-05-19 RX ADMIN — OXYCODONE HYDROCHLORIDE 5 MILLIGRAM(S): 5 TABLET ORAL at 05:32

## 2022-05-19 RX ADMIN — Medication 3 MILLIGRAM(S): at 21:26

## 2022-05-19 RX ADMIN — CLOPIDOGREL BISULFATE 75 MILLIGRAM(S): 75 TABLET, FILM COATED ORAL at 10:19

## 2022-05-19 RX ADMIN — OXYCODONE HYDROCHLORIDE 5 MILLIGRAM(S): 5 TABLET ORAL at 17:51

## 2022-05-19 RX ADMIN — Medication 650 MILLIGRAM(S): at 16:27

## 2022-05-19 RX ADMIN — Medication 100 MILLIGRAM(S): at 10:18

## 2022-05-19 RX ADMIN — Medication 100 MILLIGRAM(S): at 17:55

## 2022-05-19 RX ADMIN — OXYCODONE HYDROCHLORIDE 5 MILLIGRAM(S): 5 TABLET ORAL at 12:40

## 2022-05-19 RX ADMIN — OXYCODONE HYDROCHLORIDE 5 MILLIGRAM(S): 5 TABLET ORAL at 23:57

## 2022-05-19 RX ADMIN — Medication 2.5 MILLIGRAM(S): at 16:28

## 2022-05-19 RX ADMIN — OXYCODONE HYDROCHLORIDE 5 MILLIGRAM(S): 5 TABLET ORAL at 18:45

## 2022-05-19 RX ADMIN — Medication 250 MILLIGRAM(S): at 17:51

## 2022-05-19 NOTE — PROGRESS NOTE ADULT - PROBLEM SELECTOR PLAN 2
- Complains of severe back/neck pain with R sided paresthesias. States pain and paresthesias are positional.  - Tylenol 650 mg q 4 hours for mild pain and oxycodone 5 mg q 6 hours for severe pain   - Patient states she will follow up with outpatient neurologist once out of hospital
- Complains of severe back/neck pain with R sided paresthesias. States pain and paresthesias are positional.  - Tylenol 650 mg q 4 hours for mild pain and oxycodone 5 mg q 6 hours for severe pain   - Patient states she will follow up with outpatient neurologist once out of hospital
- Complains of severe back/neck pain with R sided paresthesias. States pain and paresthesias are positional.  - Does not admit to inability to urinate or have BM.   - Takes Tylenol with codeine #4  and percocet for pain control   - Tylenol 650 mg q 4 hours for mild pain and oxycodone 5 mg q 6 hours for severe pain   - Patient states she will follow up with outpatient neurologist once out of hospital
- Complains of severe back/neck pain with R sided paresthesias. States pain and paresthesias are positional.  - Tylenol 650 mg q 4 hours for mild pain and oxycodone 5 mg q 6 hours for severe pain   - Patient states she will follow up with outpatient neurologist once out of hospital
- Complains of severe back/neck pain with R sided paresthesias. States pain and paresthesias are positional.  - Does not admit to inability to urinate or have BM.   - Takes Tylenol with codeine #4  and percocet for pain control   - Tylenol 650 mg q 4 hours for mild pain and oxycodone 5 mg q 6 hours for severe pain   - Patient states she will follow up with outpatient neurologist once out of hospital
- Complains of severe back/neck pain with R sided paresthesias. States pain and paresthesias are positional.  - Tylenol 650 mg q 4 hours for mild pain and oxycodone 5 mg q 6 hours for severe pain   - Patient states she will follow up with outpatient neurologist once out of hospital

## 2022-05-19 NOTE — PROGRESS NOTE ADULT - PROBLEM SELECTOR PLAN 5
- Continue with Eliquis 5 mg BID, plavix 75 mg qd and metoprolol 100 mg BID   - monitor BP
- Continue with Eliquis 5 mg BID (on hold for procedure) , plavix 75 mg qd and metoprolol 100 mg BID   - monitor BP
- Continue with Eliquis 5 mg BID (on hold for procedure) , plavix 75 mg qd and metoprolol 100 mg BID   - monitor BP
- Continue with Eliquis 5 mg BID, plavix 75 mg qd and metoprolol 100 mg BID   - monitor BP
- Continue with Eliquis 5 mg BID (on hold for procedure) , plavix 75 mg qd and metoprolol 100 mg BID   - monitor BP
- Continue with Eliquis 5 mg BID (on hold for procedure) , plavix 75 mg qd and metoprolol 100 mg BID   - monitor BP

## 2022-05-19 NOTE — PROGRESS NOTE ADULT - PROBLEM SELECTOR PROBLEM 5
Coronary artery disease
laceration

## 2022-05-19 NOTE — PROGRESS NOTE ADULT - PROBLEM SELECTOR PLAN 6
- Admits to 2-3 "cocktails" per night; last drink last night 5/13  -no e/o withdrawal. d/c CIWA
- Admits to 2-3 "cocktails" per night; last drink last night 5/13  -no e/o withdrawal. d/c CIWA
- Admits to 2-3 "cocktails" per night; last drink last night 5/13  - denies hx of withdrawal   -low risk symptom triggered CIWA ordered
- Admits to 2-3 "cocktails" per night; last drink last night 5/13  -no e/o withdrawal. d/c CIWA
- Admits to 2-3 "cocktails" per night; last drink last night 5/13  -no e/o withdrawal. d/c CIWA
- Admits to 2-3 "cocktails" per night; last drink last night 5/13  - denies hx of withdrawal   -low risk symptom triggered CIWA ordered

## 2022-05-19 NOTE — PROGRESS NOTE ADULT - PROBLEM SELECTOR PLAN 7
DVT ppx: Eliquis 5 mg BID, oob   PT c/s
DVT ppx: Eliquis 5 mg BID, oob   PT c/s- home no needs
DVT ppx: Eliquis 5 mg BID, oob   PT c/s
DVT ppx: Eliquis 5 mg BID, oob   PT c/s- home no needs

## 2022-05-19 NOTE — PROGRESS NOTE ADULT - PROBLEM SELECTOR PLAN 3
- TSH   - continue with synthroid 112 mcg qd
- continue with synthroid 112 mcg qd

## 2022-05-19 NOTE — PROGRESS NOTE ADULT - ASSESSMENT
89y old  Female with a PMH of HTN, HLD, fibromyalgia, spinal stenosis on chronic pain medications, Afib on Eliquis,  s/p Medtronic PPM by Dr. Jaed on 3/24 Afib with TBS, CVA, CAD s/p WAGNER to LAD in 12/21 at [facility], hypothyroidism, anxiety, sent in from EP clinic for PPM site infection. She is s/p left PPM explant and Micra implant yesterday. left chest wall site with no bleeding or hematoma. Right groin suture removed.     Plan:  s/p Micra  - Monitor groin site for bleeding  -Post-op Micra instruction has been verbally explained and given to the patient. Patient expressed understanding and all questions were answered   -Patient is scheduled for an appointment on 5/31/22 @ 10am  -No scrubbing the groin site  -No lifting more than 5lbs or exertional exercising such as jogging, running, bike riding for 7 days  -No swimming pool, Jacuzzi, or bath for 5 days. Patient can shower 24 hours after procedure    -Pt was instructed to call [phone] if the following occurs:      - fever with temperature > 100.6      - swelling, drainage or bleeding at the site incision   - Continuous telemetry monitoring while inpatient  - Monitor electrolytes and replete K to 4 and Mg to 2  - May d/c home from EP standpoint if interrogation and CXR normal

## 2022-05-19 NOTE — PROGRESS NOTE ADULT - PROBLEM SELECTOR PROBLEM 6
Heavy alcohol use

## 2022-05-19 NOTE — PROGRESS NOTE ADULT - PROBLEM SELECTOR PLAN 1
- Patient with erythema and drainage at PPM site; PPM placement 3/24 by Dr. Jade   - s/p PPM extraction 5/16. s/p micra PPM placement 5/18  - Blood Ctx and wound Ctx X 2 -NGTD  - IV Vanco as per ID, last dose 5/19   - Hold Eliquis. f/u EP re: when to resume after procedure   - Monitor CBC and fevers

## 2022-05-19 NOTE — PROGRESS NOTE ADULT - PROBLEM SELECTOR PROBLEM 1
Infection of pacemaker pulse generator site

## 2022-05-19 NOTE — PROGRESS NOTE ADULT - PROBLEM SELECTOR PROBLEM 2
Cervical spinal stenosis

## 2022-05-19 NOTE — PROGRESS NOTE ADULT - PROBLEM SELECTOR PLAN 4
-appears calm today  - Takes valium prn, will c/w
-appears calm today  - Takes valium prn, will c/w
- patient anxious on exam   - Takes valium prn, will c/w
- patient anxious on exam   - Takes valium 5 mg TID prn   - Will continue with Valium 5 mg BID prn for anxiety
- patient anxious on exam   - Takes valium prn, will c/w
-appears calm today  - Takes valium prn, will c/w

## 2022-05-20 ENCOUNTER — TRANSCRIPTION ENCOUNTER (OUTPATIENT)
Age: 87
End: 2022-05-20

## 2022-05-20 VITALS
TEMPERATURE: 98 F | RESPIRATION RATE: 16 BRPM | OXYGEN SATURATION: 98 % | DIASTOLIC BLOOD PRESSURE: 81 MMHG | SYSTOLIC BLOOD PRESSURE: 154 MMHG | HEART RATE: 60 BPM

## 2022-05-20 LAB
ANION GAP SERPL CALC-SCNC: 13 MMOL/L — SIGNIFICANT CHANGE UP (ref 7–14)
BASOPHILS # BLD AUTO: 0.02 K/UL — SIGNIFICANT CHANGE UP (ref 0–0.2)
BASOPHILS NFR BLD AUTO: 0.4 % — SIGNIFICANT CHANGE UP (ref 0–2)
BUN SERPL-MCNC: 26 MG/DL — HIGH (ref 7–23)
CALCIUM SERPL-MCNC: 9.4 MG/DL — SIGNIFICANT CHANGE UP (ref 8.4–10.5)
CHLORIDE SERPL-SCNC: 102 MMOL/L — SIGNIFICANT CHANGE UP (ref 98–107)
CO2 SERPL-SCNC: 20 MMOL/L — LOW (ref 22–31)
CREAT SERPL-MCNC: 0.8 MG/DL — SIGNIFICANT CHANGE UP (ref 0.5–1.3)
EGFR: 68 ML/MIN/1.73M2 — SIGNIFICANT CHANGE UP
EOSINOPHIL # BLD AUTO: 0.16 K/UL — SIGNIFICANT CHANGE UP (ref 0–0.5)
EOSINOPHIL NFR BLD AUTO: 3.3 % — SIGNIFICANT CHANGE UP (ref 0–6)
GLUCOSE SERPL-MCNC: 98 MG/DL — SIGNIFICANT CHANGE UP (ref 70–99)
HCT VFR BLD CALC: 37.4 % — SIGNIFICANT CHANGE UP (ref 34.5–45)
HGB BLD-MCNC: 12.8 G/DL — SIGNIFICANT CHANGE UP (ref 11.5–15.5)
IANC: 2.62 K/UL — SIGNIFICANT CHANGE UP (ref 1.8–7.4)
IMM GRANULOCYTES NFR BLD AUTO: 0.2 % — SIGNIFICANT CHANGE UP (ref 0–1.5)
LYMPHOCYTES # BLD AUTO: 1.45 K/UL — SIGNIFICANT CHANGE UP (ref 1–3.3)
LYMPHOCYTES # BLD AUTO: 30.2 % — SIGNIFICANT CHANGE UP (ref 13–44)
MAGNESIUM SERPL-MCNC: 1.9 MG/DL — SIGNIFICANT CHANGE UP (ref 1.6–2.6)
MCHC RBC-ENTMCNC: 30.6 PG — SIGNIFICANT CHANGE UP (ref 27–34)
MCHC RBC-ENTMCNC: 34.2 GM/DL — SIGNIFICANT CHANGE UP (ref 32–36)
MCV RBC AUTO: 89.5 FL — SIGNIFICANT CHANGE UP (ref 80–100)
MONOCYTES # BLD AUTO: 0.54 K/UL — SIGNIFICANT CHANGE UP (ref 0–0.9)
MONOCYTES NFR BLD AUTO: 11.3 % — SIGNIFICANT CHANGE UP (ref 2–14)
NEUTROPHILS # BLD AUTO: 2.62 K/UL — SIGNIFICANT CHANGE UP (ref 1.8–7.4)
NEUTROPHILS NFR BLD AUTO: 54.6 % — SIGNIFICANT CHANGE UP (ref 43–77)
NRBC # BLD: 0 /100 WBCS — SIGNIFICANT CHANGE UP
NRBC # FLD: 0 K/UL — SIGNIFICANT CHANGE UP
PHOSPHATE SERPL-MCNC: 3.5 MG/DL — SIGNIFICANT CHANGE UP (ref 2.5–4.5)
PLATELET # BLD AUTO: 152 K/UL — SIGNIFICANT CHANGE UP (ref 150–400)
POTASSIUM SERPL-MCNC: 3.8 MMOL/L — SIGNIFICANT CHANGE UP (ref 3.5–5.3)
POTASSIUM SERPL-SCNC: 3.8 MMOL/L — SIGNIFICANT CHANGE UP (ref 3.5–5.3)
RBC # BLD: 4.18 M/UL — SIGNIFICANT CHANGE UP (ref 3.8–5.2)
RBC # FLD: 13.3 % — SIGNIFICANT CHANGE UP (ref 10.3–14.5)
SARS-COV-2 RNA SPEC QL NAA+PROBE: SIGNIFICANT CHANGE UP
SODIUM SERPL-SCNC: 135 MMOL/L — SIGNIFICANT CHANGE UP (ref 135–145)
WBC # BLD: 4.8 K/UL — SIGNIFICANT CHANGE UP (ref 3.8–10.5)
WBC # FLD AUTO: 4.8 K/UL — SIGNIFICANT CHANGE UP (ref 3.8–10.5)

## 2022-05-20 PROCEDURE — 99239 HOSP IP/OBS DSCHRG MGMT >30: CPT

## 2022-05-20 PROCEDURE — 99232 SBSQ HOSP IP/OBS MODERATE 35: CPT

## 2022-05-20 RX ORDER — DIAZEPAM 5 MG
2 TABLET ORAL ONCE
Refills: 0 | Status: DISCONTINUED | OUTPATIENT
Start: 2022-05-20 | End: 2022-05-20

## 2022-05-20 RX ORDER — OXYCODONE HYDROCHLORIDE 5 MG/1
1 TABLET ORAL
Qty: 10 | Refills: 0
Start: 2022-05-20

## 2022-05-20 RX ORDER — APIXABAN 2.5 MG/1
2.5 TABLET, FILM COATED ORAL EVERY 12 HOURS
Refills: 0 | Status: DISCONTINUED | OUTPATIENT
Start: 2022-05-20 | End: 2022-05-20

## 2022-05-20 RX ORDER — OXYCODONE HYDROCHLORIDE 5 MG/1
1 TABLET ORAL
Qty: 0 | Refills: 0 | DISCHARGE
Start: 2022-05-20

## 2022-05-20 RX ORDER — APIXABAN 2.5 MG/1
1 TABLET, FILM COATED ORAL
Qty: 0 | Refills: 0 | DISCHARGE
Start: 2022-05-20

## 2022-05-20 RX ORDER — APIXABAN 2.5 MG/1
1 TABLET, FILM COATED ORAL
Qty: 60 | Refills: 0
Start: 2022-05-20 | End: 2022-06-18

## 2022-05-20 RX ORDER — ACETAMINOPHEN WITH CODEINE 300MG-30MG
1 TABLET ORAL
Qty: 0 | Refills: 0 | DISCHARGE

## 2022-05-20 RX ORDER — OXYCODONE AND ACETAMINOPHEN 5; 325 MG/1; MG/1
1 TABLET ORAL
Qty: 0 | Refills: 0 | DISCHARGE

## 2022-05-20 RX ORDER — APIXABAN 2.5 MG/1
1 TABLET, FILM COATED ORAL
Qty: 0 | Refills: 0 | DISCHARGE

## 2022-05-20 RX ADMIN — Medication 2 MILLIGRAM(S): at 00:37

## 2022-05-20 RX ADMIN — CLOPIDOGREL BISULFATE 75 MILLIGRAM(S): 75 TABLET, FILM COATED ORAL at 14:00

## 2022-05-20 RX ADMIN — OXYCODONE HYDROCHLORIDE 5 MILLIGRAM(S): 5 TABLET ORAL at 17:29

## 2022-05-20 RX ADMIN — Medication 100 MILLIGRAM(S): at 09:19

## 2022-05-20 RX ADMIN — Medication 10 MILLIGRAM(S): at 13:59

## 2022-05-20 RX ADMIN — APIXABAN 2.5 MILLIGRAM(S): 2.5 TABLET, FILM COATED ORAL at 17:32

## 2022-05-20 RX ADMIN — Medication 112 MICROGRAM(S): at 05:10

## 2022-05-20 RX ADMIN — Medication 100 MILLIGRAM(S): at 17:30

## 2022-05-20 RX ADMIN — OXYCODONE HYDROCHLORIDE 5 MILLIGRAM(S): 5 TABLET ORAL at 09:19

## 2022-05-20 NOTE — PROGRESS NOTE ADULT - PROVIDER SPECIALTY LIST ADULT
Infectious Disease
Electrophysiology
Infectious Disease
Infectious Disease
Electrophysiology
Infectious Disease
Electrophysiology
Electrophysiology
Hospitalist

## 2022-05-20 NOTE — DISCHARGE NOTE NURSING/CASE MANAGEMENT/SOCIAL WORK - PATIENT PORTAL LINK FT
You can access the FollowMyHealth Patient Portal offered by [facility] by registering at the following website: [email or web address]. By joining [facility]’s FollowMyHealth portal, you will also be able to view your health information using other applications (apps) compatible with our system.

## 2022-05-20 NOTE — PROGRESS NOTE ADULT - NS ATTEND OPT1 GEN_ALL_CORE
I independently performed the documented:
I attest my time as attending is greater than 50% of the total combined time spent on qualifying patient care activities by the PA/NP and attending.
I independently performed the documented:
I attest my time as attending is greater than 50% of the total combined time spent on qualifying patient care activities by the PA/NP and attending.
I attest my time as attending is greater than 50% of the total combined time spent on qualifying patient care activities by the PA/NP and attending.

## 2022-05-20 NOTE — PROVIDER CONTACT NOTE (OTHER) - ASSESSMENT
Patient asymptomatic, denies chest pain, shortness of breath, dizziness.  Patient resting comfortably in bed.
patient is asymptomatic. denies chest pain and SOB. patient is anxious .

## 2022-05-20 NOTE — PROVIDER CONTACT NOTE (OTHER) - BACKGROUND
admitted for infected pacemaker.
(Admit Diagnosis) Infection and inflammatory reaction due to other cardiac and vascular devices, implants and grafts, initial encounter  (PMH) Anxiety  (PMH) Essential hypertension

## 2022-05-20 NOTE — DISCHARGE NOTE NURSING/CASE MANAGEMENT/SOCIAL WORK - NSDCCRNAME_GEN_ALL_CORE_FT
If you would like to talk to someone about stress, depression, or anxiety, you can call [facility]. Spearheaded by [employer], [facility] offers free, confidential mental health support. You can speak to a counselor via phone, text or chat 24 hours a day, 7 days a week. Call [phone] ([phone]).  Counseling:   for home bound clients—[facility] [phone]  [facility] [place]/[facility] program:[facility]  Service areas: [facility]   Description: Free program for adults 60+/counseling services  Contact: [phone]

## 2022-05-20 NOTE — PROGRESS NOTE ADULT - ASSESSMENT
89y old  Female with a PMH of HTN, HLD, fibromyalgia, spinal stenosis on chronic pain medications, Afib on Eliquis,  s/p Medtronic PPM by Dr. Jade on 3/24 Afib with TBS, CVA, CAD s/p WAGNER to LAD in 12/21 at [facility], hypothyroidism, anxiety, sent in from EP clinic for PPM site infection. She is s/p left PPM explant and Micra implant. Left chest wall site with no bleeding or hematoma. Right groin site with no bleeding or hematoma. CXR and interrogation normal.    Plan:  s/p Micra  - Monitor groin site for bleeding  -Post-op Micra instruction has been verbally reinforced  -Patient is scheduled for an appointment on 5/31/22 @ 10am  -No scrubbing the groin site  -No lifting more than 5lbs or exertional exercising such as jogging, running, bike riding for 7 days  -No swimming pool, Jacuzzi, or bath for 5 days. Patient can shower 24 hours after procedure    -Pt was instructed to call [phone] if the following occurs:      - fever with temperature > 100.6      - swelling, drainage or bleeding at the site incision   - Continuous telemetry monitoring while inpatient  - Monitor electrolytes and replete K to 4 and Mg to 2  - May d/c home from EP standpoint

## 2022-05-20 NOTE — DISCHARGE NOTE NURSING/CASE MANAGEMENT/SOCIAL WORK - NSDCVIVACCINE_GEN_ALL_CORE_FT
Outpatient Behavioral Health Progress Note    Date: 3/12/2018   Time Session Began: 11:00 AM    Ended: 11:47 AM    Session Type: 45 Minute Therapy (46341)    Others Present: n/a    Intervention: Behavioral and Cognitive      Suicide/Homicide/Violence Ideation: No    Change in Medication(s) Reported: No    Pt/Family Education Provided: Yes   Pt/Family Displays Understanding: Yes      Chief Complaint: Patient reported that she needs to work on anger management.      Progress Note: Patient stated that she continues to struggle with irritability and managing her anger. Her kids are often the trigger. Patient stated that she also has a friend of her staying with them to help with bills. However it appears that this friend is causing more stress.  We talked about removing or reducing the amount of stress in her life so that she does not get triggered as often.  We will talk more about specific techniques to de-escalate at the next session.  Patient reported that she is getting along better with her boyfriend and he now has a job.  She is happy about this. She is also getting along better with her daughter Casandra's father.    Patient reported that she has not started her medication yet. We discussed her fears associated with this and the patient recognizes the importance of taking her medication as prescribed. Therefore she stated she will start it today.      Need for Community Resources Assessed: yes  Resources Needed: No      MENTAL STATUS EXAM:  Hygiene Concerns:  []  Yes   [x]  No   Describe:    Appearance:   [x]  Unremarkable  []  Other    Distress:  []  Acute  []  Moderate   [x]  Mild    []  None  Gait:   [x]  Normal  []  Slow/Retarded  []  Hyper  Posture:  [x]  Normal  []  Slumped  []  Rigid    Eye Contact:  [x]  Maintained  [] Avoided [] Paxton intense  [] Improving  Mannerisms:   [x]  Unremarkable   [] Gestures [] Grimaces  [] Twitches/Tics     []  Tremor  []  Other  Behavior:  [x]  Normal  []  Restless  []   Compulsive  []  Tremulous     []  Lethargic  []  Uncoordinated  Mood/Feelings: []  Depressed  [x]  Irritable  []  Anxious  []  Fearful  []  Euphoric     []  Labile  []  Grief  []  Paranoia   []  Panic  [] Guilt/shame     []  Apathy/indifference  []  Jealousy  []  Helpless  []  Hopeless     []  Euthymic  []  Other    Affect:   [x]  Normal  []  Constricted  [] Flat  [x]  Blunted      [] Appropriate to Content   []Inappropriate to Content  Thought Processes: [x]  Congruent  []  Incongruent  [] Loose Associations     []  Poverty of Ideas  []  Tangential    []  Incoherence     []  Blocking/thought interruption  Thought Content: [x]  Normal  []  Delusions  []  Obsessions  []  Phobias     []  Hypochondria  []  Sexual Preoccupation  Perceptual Problems: [x]  None  [] Hallucinations  []  Auditory  [] Visual  [] Perceptual  Orientation:  [x]  Normal/No Impairment  []  Person  []  Place  []  Time  Speech:  [x]  Clear/Articulate  []  Soft  []  Loud  []  Pressured  []  Animated     []  Rambling  []  Slurred  Insight:  []  Good  []  Fair  [x]  Poor  Judgement:  []  Good  [x]  Fair  []  Poor  Recent Memory: [x]  Good  []  Fair  []  Poor  Remote Memory: [x]  Good  []  Fair  []  Poor  Concentration: [x]  Good  []  Fair  []  Poor  Attention:  [x]  Good  []  Fair  []  Poor  Level of Engagement:   [x]  Open  []  Guarded   []  Resistant      Diagnosis: (F41.1) Generalized anxiety disorder  (primary encounter diagnosis)  (F43.10) PTSD (post-traumatic stress disorder)  (F63.81) Intermittent explosive disorder  (F39) Mood disorder (CMS/[facility])      Treatment Plan: Modified (see treatment plan)    Discharge Plan: N/A    Next Appointment: 1 month     Plan/Goals:   Effective management of mood/behavior/health through:  Explore and implement activities that bring pleasure.  Replace negative self-talk with positive dialogue.   Increase awareness of errors in thinking and retrain.  Compliance with psychotherapist and prescriber.  Explore/implement  strategies that bring calm and redirect/clear focus on thought.     Jessica Sneed, LPC   No Vaccines Administered.

## 2022-05-20 NOTE — PROGRESS NOTE ADULT - ASSESSMENT
89F anxiety/depression, CAD, HTN, chol, CLL (remission), tachy/giselle s/p PPM placement (March 2022).  Referred to the ER after being seen by EP.  PPM site noted to be erythematous with drainage.  No fever.  No leukocytosis.  BC and drainage culture sent.  ID asked to help management.      PPM infection  - superficial infection s/p device explant  - wound cultures negative  - BC remain negative  - s/p Micra  - completed 7 days vancomycin  - to continue to observe off antibiotics    PAUL  - resolved  - monitor creatinine    Please call the ID service [phone] with questions or concerns over the weekend

## 2022-05-20 NOTE — PROVIDER CONTACT NOTE (OTHER) - ACTION/TREATMENT ORDERED:
provider made aware. manual bp done. will continue to monitor.
ACP Franca Agrawal did not order any interventions at this time.  ACP aware, RN rescheduled AM metoprolol for later in the day.  Will continue to monitor.

## 2022-05-20 NOTE — DISCHARGE NOTE NURSING/CASE MANAGEMENT/SOCIAL WORK - NSDCPEFALRISK_GEN_ALL_CORE
For information on Fall & Injury Prevention, visit: [email or web address] OR  [email or web address] OR  https://www.cdc.gov/steadi/patient.html

## 2022-05-20 NOTE — CHART NOTE - NSCHARTNOTEFT_GEN_A_CORE
Others' Prescriptions  Patient Name: Maday AlbaBirth Date: 02/11/1928  Address: [address] [address]Bbx: Female  Rx Written	Rx Dispensed	Drug	Quantity	Days Supply	Prescriber Name	Prescriber So #	Payment Method	Dispenser  05/20/2021	05/27/2021	claraceed 1t:20c 0.5mg thc and 10mg cbd/tablet (30 ct)	60	10	Arlene Jernigan MD	[id number]	[facility]  05/20/2021	05/27/2021	theraceed 10 mg thc and 0.5 mg cbd/odt orange	1	7	Arlene Jernigan MD	[id number]	[facility]  05/20/2021	05/27/2021	theraceed 4mg thc and 0.2mg cbd per 3 second inhalation	1	2	Arlene Jernigan MD	[id number]	[facility]    Patient Name: Maday AlbaBirth Date: 02/11/1928  Address: [address] [address]Whi: Female  Rx Written	Rx Dispensed	Drug	Quantity	Days Supply	Prescriber Name	Prescriber So #	Payment Method	Dispenser  03/30/2022	03/31/2022	acetaminophen-cod #4 tablet	180	30	Ann Rudolph MD	[id number]	Medicare	[facility] #[id number]  03/30/2022	03/31/2022	diazepam 5 mg tablet	30	30	Namrata Louie MD	[id number]	Medicare	[facility] #[id number]  02/25/2022	02/25/2022	diazepam 5 mg tablet	30	30	Elmer Cobb MD	[id number]	Medicare	[facility] #[id number]  02/25/2022	02/25/2022	acetaminophen-cod #4 tablet	180	30	Ann Rudolph MD	[id number]	Medicare	[facility] #[id number]  02/17/2022	02/18/2022	acetaminophen-cod #4 tablet	36	6	Hany Hernandez MD	[id number]	Medicare	[facility] #[id number]  12/27/2021	01/10/2022	oxycodone-acetaminophen 5-325 mg tab	180	15	Rossi Dobson	[id number]	Medicare	[facility] #[id number]  12/18/2021	12/23/2021	diazepam 5 mg tablet	30	30	Namrata Louie MD	[id number]	Medicare	[facility] #[id number]  11/17/2021	11/18/2021	oxycodone-acetaminophen 5-325 mg tab	180	30	Ann Rudolph MD	[id number]	Medicare	[facility] #[id number]  10/21/2021	10/22/2021	acetaminophen-cod #4 tablet	180	30	Hany Hernandez MD	[id number]	Medicare	[facility] #[id number]  09/23/2021	09/23/2021	acetaminophen-cod #4 tablet	180	30	Ann Rudolph MD	[id number]	Medicare	[facility] #[id number]  08/19/2021	08/20/2021	acetaminophen-cod #4 tablet	180	30	Ann Rudolph MD	[id number]	Medicare	[facility] #[id number]  08/20/2021	08/20/2021	diazepam 5 mg tablet	30	30	Uriel Man MD	[id number]	Medicare	[facility] #[id number]  07/19/2021	07/20/2021	acetaminophen-cod #4 tablet	180	30	Ann Rudolph MD	[id number]	Medicare	[facility] #[id number]  06/14/2021	06/16/2021	acetaminophen-cod #4 tablet	180	30	Ann Rudolph MD	[id number]	Medicare	[facility] #[id number]  05/19/2021	05/20/2021	diazepam 5 mg tablet	30	30	Namrata Louie MD	[id number]	Medicare	[facility] #[id number]
pt seen and examined. vitals, labs, EP and ID consult note reviewed. pt is stable for discharge home today. f/u EP as outpt  total time spent on dc 41min
Patient status post Micra PPM via right femoral access. Site clean, dry, and intact. No hematoma or active bleeding. Old blood noted on gauze no evidence of active bleeding noted. Extremities warm to touch. Pulses present b/l, capillary refill appropriate. No bruits noted on ausculation of femoral artery. Will continue to monitor.    T(C): 36.7 (05-18-22 @ 22:32), Max: 36.7 (05-18-22 @ 22:32)  HR: 72 (05-18-22 @ 22:32) (55 - 72)  BP: 138/76 (05-18-22 @ 22:32) (135/62 - 151/67)  RR: 18 (05-18-22 @ 22:32) (16 - 18)  SpO2: 99% (05-18-22 @ 22:32) (98% - 99%)      Chandler Finnegan PA-C  Department of Medicine  [facility]   In House Page [phone]
Vancomycin discontinued overnight. Discussed with Dr. Singh, recommend to c/w cefepime and vanco pending official ID consult, blood cultures testing. Dosing confirmed with pharmacy, recommend vanco 1g q24h based on CrCl, check vanco level pre-3rd dose, 2nd dose to be given this evening.

## 2022-05-20 NOTE — PROGRESS NOTE ADULT - REASON FOR ADMISSION
PPM site infection

## 2022-05-20 NOTE — PROGRESS NOTE ADULT - NS ATTEND AMEND GEN_ALL_CORE FT
89y old  Female with a PMH of HTN, HLD, fibromyalgia, spinal stenosis on chronic pain medications, Afib on Eliquis,  s/p Medtronic PPM by Dr. Jade on 3/24 Afib with TBS, CVA, CAD s/p WAGNER to LAD in 12/21 at [facility], hypothyroidism, anxiety, sent in from EP clinic for PPM site infection. Plan for extraction today and reimplant in future. Cont IV abx.
89y old  Female with a PMH of HTN, HLD, fibromyalgia, spinal stenosis on chronic pain medications, Afib on Eliquis,  s/p Medtronic PPM by Dr. Jade on 3/24 Afib with TBS, CVA, CAD s/p WAGNER to LAD in 12/21 at [facility], hypothyroidism, anxiety, sent in from EP clinic for PPM site infection. Plan for leadless PPM implant when ready.
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
89y old  Female with a PMH of HTN, HLD, fibromyalgia, spinal stenosis on chronic pain medications, Afib on Eliquis,  s/p Medtronic PPM by Dr. Jade on 3/24 Afib with TBS, CVA, CAD s/p WAGNER to LAD in 12/21 at [facility], hypothyroidism, anxiety, sent in from EP clinic for PPM site infection. Plan on micra implant today. Will follow.
89y old  Female with a PMH of HTN, HLD, fibromyalgia, spinal stenosis on chronic pain medications, Afib on Eliquis,  s/p Medtronic PPM by Dr. Jade on 3/24 Afib with TBS, CVA, CAD s/p WAGNER to LAD in 12/21 at [facility], hypothyroidism, anxiety, sent in from EP clinic for PPM site infection. She is s/p left PPM explant and Micra implant. Left chest wall site with no bleeding or hematoma. Right groin site with no bleeding or hematoma. CXR and interrogation normal.    Plan:  Patient ready for discharge.

## 2022-05-20 NOTE — PROGRESS NOTE ADULT - SUBJECTIVE AND OBJECTIVE BOX
[facility] Division of [facility]  Myriam Man MD  Pager [phone]      Patient is a 89y old  Female who presents with a chief complaint of PPM site infection (17 May 2022 10:15)      SUBJECTIVE / OVERNIGHT EVENTS:    pt is s/p PPM extraction yesterday, tolerated procedure well. denies pain.     ADDITIONAL REVIEW OF SYSTEMS:    RESPIRATORY: No cough, wheezing, chills or hemoptysis; No shortness of breath  CARDIOVASCULAR: No chest pain, palpitations, dizziness, or leg swelling  GASTROINTESTINAL: No abdominal or epigastric pain. No nausea, vomiting, or hematemesis; No diarrhea or constipation. No melena or hematochezia.      MEDICATIONS  (STANDING):  acetaminophen   IVPB .. 1000 milliGRAM(s) IV Intermittent once  clopidogrel Tablet 75 milliGRAM(s) Oral daily  levothyroxine 112 MICROGram(s) Oral daily  metoprolol succinate  milliGRAM(s) Oral two times a day  pantoprazole    Tablet 40 milliGRAM(s) Oral before breakfast  vancomycin  IVPB 1000 milliGRAM(s) IV Intermittent every 24 hours    MEDICATIONS  (PRN):  acetaminophen     Tablet .. 650 milliGRAM(s) Oral every 6 hours PRN Mild Pain (1 - 3), Moderate Pain (4 - 6)  diazepam    Tablet 2.5 milliGRAM(s) Oral every 12 hours PRN anxiety  melatonin 3 milliGRAM(s) Oral at bedtime PRN Insomnia  oxyCODONE    IR 5 milliGRAM(s) Oral every 6 hours PRN Moderate Pain (4 - 6)      CAPILLARY BLOOD GLUCOSE        I&O's Summary    16 May 2022 07:01  -  17 May 2022 07:00  --------------------------------------------------------  IN: 400 mL / OUT: 0 mL / NET: 400 mL        PHYSICAL EXAM:  Vital Signs Last 24 Hrs  T(C): 36.4 (17 May 2022 10:00), Max: 36.9 (17 May 2022 05:01)  T(F): 97.6 (17 May 2022 10:00), Max: 98.4 (17 May 2022 05:01)  HR: 68 (17 May 2022 10:00) (68 - 91)  BP: 108/81 (17 May 2022 10:00) (108/81 - 141/71)  BP(mean): --  RR: 16 (17 May 2022 10:00) (16 - 18)  SpO2: 99% (17 May 2022 10:00) (98% - 99%)    CONSTITUTIONAL: NAD,  EYES: PERRLA; conjunctiva and sclera clear  ENMT: Moist oral mucosa, no pharyngeal injection or exudates;   NECK: Supple, no palpable masses;  RESPIRATORY: Normal respiratory effort; lungs are clear to auscultation bilaterally  CARDIOVASCULAR: Regular rate and rhythm, normal S1 and S2, no murmur/rub/gallop; No lower extremity edema; Peripheral pulses are 2+ bilaterally  ABDOMEN: Nontender to palpation, normoactive bowel sounds, no rebound/guarding;   MUSCLOSKELETAL:   no clubbing or cyanosis of digits; no joint swelling or tenderness to palpation  PSYCH: A+O to person, place, and time; affect appropriate  NEUROLOGY: CN 2-12 are intact and symmetric; no gross sensory deficits;   SKIN: prior PPM site dressing c/d/i     LABS:                        14.5   8.49  )-----------( 208      ( 17 May 2022 07:20 )             41.5     05-17    130<L>  |  99  |  20  ----------------------------<  223<H>  4.3   |  20<L>  |  0.90    Ca    9.4      17 May 2022 07:20  Phos  3.9     05-17  Mg     1.90     05-17      PT/INR - ( 16 May 2022 07:05 )   PT: 13.0 sec;   INR: 1.12 ratio         PTT - ( 16 May 2022 07:05 )  PTT:34.8 sec            RADIOLOGY & ADDITIONAL TESTS:  Results Reviewed:   Imaging Personally Reviewed:  Electrocardiogram Personally Reviewed:    COORDINATION OF CARE:  Care Discussed with Consultants/Other Providers [Y/N]:  Prior or Outpatient Records Reviewed [Y/N]:  
[facility] Division of [facility]  Myriam Man MD  Pager [phone]      Patient is a 89y old  Female who presents with a chief complaint of PPM site infection (19 May 2022 10:57)      SUBJECTIVE / OVERNIGHT EVENTS:    no acute event o/n. Pt tolerated micra ppm placement well. no new complaint     ADDITIONAL REVIEW OF SYSTEMS:    RESPIRATORY: No cough, wheezing, chills or hemoptysis; No shortness of breath  CARDIOVASCULAR: No chest pain, palpitations, dizziness, or leg swelling  GASTROINTESTINAL: No abdominal or epigastric pain. No nausea, vomiting, or hematemesis; No diarrhea or constipation. No melena or hematochezia.      MEDICATIONS  (STANDING):  acetaminophen   IVPB .. 1000 milliGRAM(s) IV Intermittent once  clopidogrel Tablet 75 milliGRAM(s) Oral daily  levothyroxine 112 MICROGram(s) Oral daily  metoprolol succinate  milliGRAM(s) Oral two times a day  pantoprazole    Tablet 40 milliGRAM(s) Oral before breakfast  sodium chloride 0.9%. 1000 milliLiter(s) (75 mL/Hr) IV Continuous <Continuous>  vancomycin  IVPB 1000 milliGRAM(s) IV Intermittent every 24 hours    MEDICATIONS  (PRN):  acetaminophen     Tablet .. 650 milliGRAM(s) Oral every 6 hours PRN Mild Pain (1 - 3), Moderate Pain (4 - 6)  diazepam    Tablet 2.5 milliGRAM(s) Oral every 12 hours PRN anxiety  melatonin 3 milliGRAM(s) Oral at bedtime PRN Insomnia  oxyCODONE    IR 5 milliGRAM(s) Oral every 6 hours PRN Moderate Pain (4 - 6)      CAPILLARY BLOOD GLUCOSE        I&O's Summary    18 May 2022 07:01  -  19 May 2022 07:00  --------------------------------------------------------  IN: 0 mL / OUT: 300 mL / NET: -300 mL        PHYSICAL EXAM:  Vital Signs Last 24 Hrs  T(C): 36.7 (19 May 2022 10:18), Max: 36.8 (19 May 2022 05:30)  T(F): 98 (19 May 2022 10:18), Max: 98.2 (19 May 2022 05:30)  HR: 67 (19 May 2022 10:18) (58 - 72)  BP: 111/75 (19 May 2022 10:18) (111/75 - 147/71)  BP(mean): 90 (18 May 2022 18:36) (87 - 90)  RR: 16 (19 May 2022 10:18) (16 - 18)  SpO2: 99% (19 May 2022 10:18) (99% - 99%)    CONSTITUTIONAL: NAD,  EYES: PERRLA; conjunctiva and sclera clear  ENMT: Moist oral mucosa, no pharyngeal injection or exudates;   NECK: Supple, no palpable masses;  RESPIRATORY: Normal respiratory effort; lungs are clear to auscultation bilaterally  CARDIOVASCULAR: Regular rate and rhythm, normal S1 and S2, no murmur/rub/gallop; No lower extremity edema; Peripheral pulses are 2+ bilaterally  ABDOMEN: Nontender to palpation, normoactive bowel sounds, no rebound/guarding;   MUSCLOSKELETAL:   no clubbing or cyanosis of digits; no joint swelling or tenderness to palpation  PSYCH: A+O to person, place, and time; affect appropriate  NEUROLOGY: CN 2-12 are intact and symmetric; no gross sensory deficits;   SKIN: prior PPM site dressing c/d/i     LABS:                        12.7   6.97  )-----------( 161      ( 19 May 2022 07:45 )             37.0     05-19    135  |  102  |  27<H>  ----------------------------<  94  4.0   |  21<L>  |  0.78    Ca    9.2      19 May 2022 07:45  Phos  3.3     05-19  Mg     1.90     05-19      PT/INR - ( 18 May 2022 06:58 )   PT: 12.5 sec;   INR: 1.08 ratio         PTT - ( 18 May 2022 06:58 )  PTT:31.4 sec          Culture - Surgical Swab (collected 16 May 2022 17:55)  Source: .Surgical Swab Pacemaker Lead  Preliminary Report (17 May 2022 16:14):    No growth    Culture - Surgical Swab (collected 16 May 2022 17:48)  Source: .Surgical Swab Pacemaker pocket  Preliminary Report (17 May 2022 16:13):    No growth    Culture - Surgical Swab (collected 16 May 2022 15:30)  Source: .Surgical Swab Pacemaker Lead  Preliminary Report (17 May 2022 16:15):    No growth        RADIOLOGY & ADDITIONAL TESTS:  Results Reviewed:   Imaging Personally Reviewed:  Electrocardiogram Personally Reviewed:    COORDINATION OF CARE:  Care Discussed with Consultants/Other Providers [Y/N]:  Prior or Outpatient Records Reviewed [Y/N]:  
Interval history:  No o/n event  NPO for anticipated PPM explant / reimplantation   AC on hold for the procedure      PAST MEDICAL & SURGICAL HISTORY:  History of Hysterectomy  40 years ago    Benign Essential Hypertension    Hypothyroidism    Depression    Fibromyalgia    CVA (cerebral vascular accident)    Spinal stenosis    Essential hypertension    Anxiety    Depression, major    H/O: Hysterectomy  40 years ago    History of Tonsillectomy  childhood    S/P Laminectomy  Lumbar Laminectomy 2001    Cataract  2008, 2009        MEDICATIONS  (STANDING):  acetaminophen   IVPB .. 1000 milliGRAM(s) IV Intermittent once  clopidogrel Tablet 75 milliGRAM(s) Oral daily  levothyroxine 112 MICROGram(s) Oral daily  metoprolol succinate  milliGRAM(s) Oral two times a day  pantoprazole    Tablet 40 milliGRAM(s) Oral before breakfast  potassium chloride    Tablet ER 40 milliEquivalent(s) Oral once  vancomycin  IVPB 750 milliGRAM(s) IV Intermittent every 24 hours    MEDICATIONS  (PRN):  acetaminophen     Tablet .. 650 milliGRAM(s) Oral every 6 hours PRN Mild Pain (1 - 3), Moderate Pain (4 - 6)  diazepam    Tablet 2.5 milliGRAM(s) Oral every 12 hours PRN anxiety  melatonin 3 milliGRAM(s) Oral at bedtime PRN Insomnia  oxyCODONE    IR 5 milliGRAM(s) Oral every 6 hours PRN Moderate Pain (4 - 6)            Vital Signs Last 24 Hrs  T(C): 36.7 (16 May 2022 09:00), Max: 36.8 (15 May 2022 13:00)  T(F): 98 (16 May 2022 09:00), Max: 98.2 (15 May 2022 13:00)  HR: 68 (16 May 2022 09:00) (64 - 85)  BP: 134/72 (16 May 2022 09:00) (134/72 - 154/72)  BP(mean): --  RR: 18 (16 May 2022 09:00) (17 - 18)  SpO2: 98% (16 May 2022 09:00) (97% - 99%)            LABS:                        13.7   4.92  )-----------( 162      ( 16 May 2022 07:05 )             39.0     05-16    133<L>  |  100  |  16  ----------------------------<  94  3.6   |  22  |  0.80    Ca    9.4      16 May 2022 07:05  Phos  3.8     05-16  Mg     1.90     05-16          PT/INR - ( 16 May 2022 07:05 )   PT: 13.0 sec;   INR: 1.12 ratio         PTT - ( 16 May 2022 07:05 )  PTT:34.8 sec    I&O's Summary    BNP  RADIOLOGY & ADDITIONAL STUDIES:      PHYSICAL EXAM:    GENERAL: In no apparent distress, well nourished, and hydrated.  HEART: Regular rate and rhythm; systolic murmurs, rubs, or gallops.+L site PPM site with dressing C/D/I  PULMONARY: Clear to auscultation and percussion.  No rales, wheezing, or rhonchi bilaterally.  ABDOMEN: Soft, Nontender, Nondistended; Bowel sounds present  EXTREMITIES:  2+ Peripheral Pulses, No clubbing, cyanosis, or edema  NEUROLOGICAL: Grossly nonfocal        
Overnight Events:   No overnight events    Current Meds:  acetaminophen     Tablet .. 650 milliGRAM(s) Oral every 6 hours PRN  clopidogrel Tablet 75 milliGRAM(s) Oral daily  diazepam    Tablet 2.5 milliGRAM(s) Oral every 12 hours PRN  levothyroxine 112 MICROGram(s) Oral daily  melatonin 3 milliGRAM(s) Oral at bedtime PRN  metoprolol succinate  milliGRAM(s) Oral two times a day  oxyCODONE    IR 5 milliGRAM(s) Oral every 6 hours PRN  pantoprazole    Tablet 40 milliGRAM(s) Oral before breakfast  vancomycin  IVPB 750 milliGRAM(s) IV Intermittent every 24 hours        Vitals:  ICU Vital Signs Last 24 Hrs  T(C): 36.4 (15 May 2022 05:00), Max: 36.9 (14 May 2022 23:30)  T(F): 97.6 (15 May 2022 05:00), Max: 98.4 (14 May 2022 23:30)  HR: 82 (15 May 2022 05:00) (60 - 87)  BP: 159/82 (15 May 2022 05:00) (122/78 - 159/82)  BP(mean): --  ABP: --  ABP(mean): --  RR: 18 (15 May 2022 05:00) (16 - 19)  SpO2: 98% (15 May 2022 05:00) (98% - 100%)      I&O's Summary          Physical Exam:  Appearance: No acute distress; well appearing  Eyes: PERRL, EOMI, pink conjunctiva  HENT: Normal oral mucosa  Cardiovascular: RRR, S1, S2, no murmurs, rubs, or gallops; no edema; no JVD, pocket site erythemetous, non tender and without drainage  Respiratory: Clear to auscultation bilaterally  Gastrointestinal: soft, non-tender, non-distended with normal bowel sounds  Musculoskeletal: No clubbing; no joint deformity   Neurologic: Non-focal  Lymphatic: No lymphadenopathy  Psychiatry: AAOx3, mood & affect appropriate  Skin: No rashes, ecchymoses, or cyanosis                          10.4   14.39 )-----------( 637      ( 15 May 2022 06:50 )             31.8     05-15    138  |  103  |  41<H>  ----------------------------<  108<H>  4.3   |  22  |  1.24    Ca    9.9      15 May 2022 06:50  Phos  4.2     05-15  Mg     2.10     05-15    TPro  7.2  /  Alb  4.8  /  TBili  1.3<H>  /  DBili  x   /  AST  26  /  ALT  16  /  AlkPhos  87  05-13    PT/INR - ( 13 May 2022 20:48 )   PT: 14.4 sec;   INR: 1.24 ratio         PTT - ( 13 May 2022 20:48 )  PTT:38.6 sec              New ECG(s): Personally reviewed    Echo:    Stress Testing:     Cath:    Imaging:    Interpretation of Telemetry:  paced 60bpm
Patient is seen and examined. Denies any chest pain, SOB, palpitations or dizziness. She feels well    PAST MEDICAL & SURGICAL HISTORY:  History of Hysterectomy  40 years ago    Benign Essential Hypertension    Hypothyroidism    Depression    Fibromyalgia    CVA (cerebral vascular accident)    Spinal stenosis    Essential hypertension    Anxiety    Depression, major    H/O: Hysterectomy  40 years ago    History of Tonsillectomy  childhood    S/P Laminectomy  Lumbar Laminectomy 2001    Cataract  2008, 2009        MEDICATIONS  (STANDING):  acetaminophen   IVPB .. 1000 milliGRAM(s) IV Intermittent once  clopidogrel Tablet 75 milliGRAM(s) Oral daily  levothyroxine 112 MICROGram(s) Oral daily  metoprolol succinate  milliGRAM(s) Oral two times a day  pantoprazole    Tablet 40 milliGRAM(s) Oral before breakfast  sodium chloride 0.9%. 1000 milliLiter(s) (75 mL/Hr) IV Continuous <Continuous>    MEDICATIONS  (PRN):  acetaminophen     Tablet .. 650 milliGRAM(s) Oral every 6 hours PRN Mild Pain (1 - 3), Moderate Pain (4 - 6)  diazepam    Tablet 2.5 milliGRAM(s) Oral every 12 hours PRN anxiety  melatonin 3 milliGRAM(s) Oral at bedtime PRN Insomnia  oxyCODONE    IR 5 milliGRAM(s) Oral every 6 hours PRN Moderate Pain (4 - 6)      Vital Signs Last 24 Hrs  T(C): 36.9 (20 May 2022 09:18), Max: 36.9 (19 May 2022 22:05)  T(F): 98.5 (20 May 2022 09:18), Max: 98.5 (20 May 2022 09:18)  HR: 61 (20 May 2022 09:18) (52 - 69)  BP: 132/70 (20 May 2022 09:18) (111/75 - 154/90)  BP(mean): --  RR: 16 (20 May 2022 09:18) (16 - 18)  SpO2: 99% (20 May 2022 09:18) (98% - 99%)    INTERPRETATION OF TELEMETRY: Sinus rhythm with occ PVCs @ 50s-60s    LABS:                        12.8   4.80  )-----------( 152      ( 20 May 2022 04:20 )             37.4     05-20    135  |  102  |  26<H>  ----------------------------<  98  3.8   |  20<L>  |  0.80    Ca    9.4      20 May 2022 04:20  Phos  3.5     05-20  Mg     1.90     05-20      PHYSICAL EXAM:    GENERAL: In no apparent distress, well nourished, and hydrated.  HEART: Regular rate and rhythm; No murmurs, rubs, or gallops.  PULMONARY: Clear to auscultation and percussion.  No rales, wheezing, or rhonchi bilaterally.  ABDOMEN: Soft, Nontender, Nondistended; Bowel sounds present  EXTREMITIES:  2+ Peripheral Pulses, No clubbing, cyanosis, or edema          
[facility] Division of Hospital Medicine  Myriam Man MD  Pager [phone]      Patient is a 89y old  Female who presents with a chief complaint of PPM site infection (18 May 2022 11:06)      SUBJECTIVE / OVERNIGHT EVENTS:    no acute event o/n. Pt is scheduled for Micra PPM placement today. Offers no new complaint     ADDITIONAL REVIEW OF SYSTEMS:    RESPIRATORY: No cough, wheezing, chills or hemoptysis; No shortness of breath  CARDIOVASCULAR: No chest pain, palpitations, dizziness, or leg swelling  GASTROINTESTINAL: No abdominal or epigastric pain. No nausea, vomiting, or hematemesis; No diarrhea or constipation. No melena or hematochezia.      MEDICATIONS  (STANDING):  acetaminophen   IVPB .. 1000 milliGRAM(s) IV Intermittent once  clopidogrel Tablet 75 milliGRAM(s) Oral daily  levothyroxine 112 MICROGram(s) Oral daily  metoprolol succinate  milliGRAM(s) Oral two times a day  pantoprazole    Tablet 40 milliGRAM(s) Oral before breakfast  sodium chloride 0.9%. 1000 milliLiter(s) (75 mL/Hr) IV Continuous <Continuous>  vancomycin  IVPB 1000 milliGRAM(s) IV Intermittent every 24 hours    MEDICATIONS  (PRN):  acetaminophen     Tablet .. 650 milliGRAM(s) Oral every 6 hours PRN Mild Pain (1 - 3), Moderate Pain (4 - 6)  diazepam    Tablet 2.5 milliGRAM(s) Oral every 12 hours PRN anxiety  melatonin 3 milliGRAM(s) Oral at bedtime PRN Insomnia  oxyCODONE    IR 5 milliGRAM(s) Oral every 6 hours PRN Moderate Pain (4 - 6)      CAPILLARY BLOOD GLUCOSE        I&O's Summary      PHYSICAL EXAM:  Vital Signs Last 24 Hrs  T(C): 36.4 (18 May 2022 11:07), Max: 36.6 (18 May 2022 04:51)  T(F): 97.5 (18 May 2022 11:07), Max: 97.8 (18 May 2022 04:51)  HR: 55 (18 May 2022 11:07) (55 - 75)  BP: 151/67 (18 May 2022 11:07) (130/66 - 151/67)  BP(mean): --  RR: 16 (18 May 2022 11:07) (16 - 16)  SpO2: 98% (18 May 2022 11:07) (98% - 99%)    CONSTITUTIONAL: NAD,  EYES: PERRLA; conjunctiva and sclera clear  ENMT: Moist oral mucosa, no pharyngeal injection or exudates;   NECK: Supple, no palpable masses;  RESPIRATORY: Normal respiratory effort; lungs are clear to auscultation bilaterally  CARDIOVASCULAR: Regular rate and rhythm, normal S1 and S2, no murmur/rub/gallop; No lower extremity edema; Peripheral pulses are 2+ bilaterally  ABDOMEN: Nontender to palpation, normoactive bowel sounds, no rebound/guarding;   MUSCLOSKELETAL:   no clubbing or cyanosis of digits; no joint swelling or tenderness to palpation  PSYCH: A+O to person, place, and time; affect appropriate  NEUROLOGY: CN 2-12 are intact and symmetric; no gross sensory deficits;   SKIN: prior PPM site dressing c/d/i     LABS:                        13.3   7.34  )-----------( 158      ( 18 May 2022 06:58 )             38.9     05-18    135  |  102  |  28<H>  ----------------------------<  99  4.1   |  22  |  0.90    Ca    9.4      18 May 2022 06:58  Phos  3.2     05-18  Mg     2.00     05-18      PT/INR - ( 18 May 2022 06:58 )   PT: 12.5 sec;   INR: 1.08 ratio         PTT - ( 18 May 2022 06:58 )  PTT:31.4 sec          Culture - Surgical Swab (collected 16 May 2022 17:55)  Source: .Surgical Swab Pacemaker Lead  Preliminary Report (17 May 2022 16:14):    No growth    Culture - Surgical Swab (collected 16 May 2022 17:48)  Source: .Surgical Swab Pacemaker pocket  Preliminary Report (17 May 2022 16:13):    No growth    Culture - Surgical Swab (collected 16 May 2022 15:30)  Source: .Surgical Swab Pacemaker Lead  Preliminary Report (17 May 2022 16:15):    No growth        RADIOLOGY & ADDITIONAL TESTS:  Results Reviewed:   Imaging Personally Reviewed:  Electrocardiogram Personally Reviewed:    COORDINATION OF CARE:  Care Discussed with Consultants/Other Providers [Y/N]:  Prior or Outpatient Records Reviewed [Y/N]:  
Patient is a 89y old  Female who presents with a chief complaint of PPM site infection (14 May 2022 08:49)    f/u ppm infection    Interval History/ROS:  s/p PPM explantation and implant of micra.  no fever/chills.  antibiotics stopped as all cultures negative.  feels okay.  anxious.  Remainder of ROS otherwise negative.    PAST MEDICAL & SURGICAL HISTORY:  Benign Essential Hypertension  Hypothyroidism  Depression  Fibromyalgia  CVA (cerebral vascular accident)  Spinal stenosis  Essential hypertension  Anxiety  H/O: Hysterectomy 40 years ago  History of Tonsillectomy, childhood  S/P Laminectomy  Lumbar Laminectomy   Cataract ,     Allergies  dust (Sneezing)  lactose (Unknown)  nonsteroidal anti-inflammatory agents (Unknown)  Originally Entered as [Unknown] reaction to [SULFITE] (Unknown)  penicillin (Rash)    ANTIMICROBIALS:  cefepime   IVPB 1000 every 24 hours (5/13x1)  vancomycin  IVPB 1000 every 24 hours (-)    MEDICATIONS  (STANDING):  acetaminophen   IVPB .. 1000 once  apixaban 2.5 every 12 hours  clopidogrel Tablet 75 daily  levothyroxine 112 daily  metoprolol succinate  two times a day  pantoprazole    Tablet 40 before breakfast    Vital Signs Last 24 Hrs  T(F): 97.9 (22 @ 10:59), Max: 98.5 (22 @ 09:18)  HR: 60 (22 @ 10:59)  BP: 139/73 (-22 @ 10:59)  RR: 18 (- @ 10:59)  SpO2: 97% (22 @ 10:59) (97% - 99%)  Wt(kg): --    PHYSICAL EXAM:  Constitutional: non-toxic, no distress  HEAD/EYES: anicteric  ENT:  supple  Cardiovascular:   normal S1/S2, no murmur, PPM out, dressing c/d/i  Respiratory:  clear BS bilaterally,  GI:  soft, non-tender, normal bowel sounds  :  no chauhan  Musculoskeletal:  no synovitis  Neurologic: awake and alert,  no focal findings  Skin:  no rash  Psychiatric:  awake, alert, sad affect, anxious                              12.8   4.80  )-----------( 152      ( 20 May 2022 04:20 )             37.4 05-20    135  |  102  |  26  ----------------------------<  98  3.8   |  20  |  0.80  Ca    9.4      20 May 2022 04:20Phos  3.5     05-20Mg     1.90     -20    Urinalysis Basic - ( 13 May 2022 23:56 )  Color: Light Yellow / Appearance: Clear / S.009 / pH: x  Gluc: x / Ketone: Negative  / Bili: Negative / Urobili: <2 mg/dL   Blood: x / Protein: Negative / Nitrite: Negative   Leuk Esterase: Negative / RBC: x / WBC x   Sq Epi: x / Non Sq Epi: x / Bacteria: x    MICROBIOLOGY:  Culture - Surgical Swab (collected 22 @ 17:55)  Source: .Surgical Swab Pacemaker Lead  Preliminary Report (22 @ 16:14):    No growth    Culture - Surgical Swab (collected 22 @ 17:48)  Source: .Surgical Swab Pacemaker pocket  Preliminary Report (22 @ 16:13):    No growth    Culture - Surgical Swab (collected 22 @ 15:30)  Source: .Surgical Swab Pacemaker Lead  Preliminary Report (22 @ 16:15):    No growth    Culture - Other (collected 22 @ 23:35)  Source: Wound Wound  Final Report (05-15-22 @ 17:21):    No growth    Culture - Other (collected 22 @ 23:35)  Source: Wound Wound  Final Report (05-15-22 @ 17:21):    No growth    Culture - Blood (collected 22 @ 20:05)  Source: .Blood Blood-Venous  Final Report (22 @ 01:00):    No Growth Final    Culture - Blood (collected 22 @ 19:55)  Source: .Blood Blood-Peripheral  Final Report (22 @ 01:00):    No Growth Final    COVID-19 PCR: NotDetec (22 @ 21:20)  COVID-19 PCR: NotDetec (22 @ 17:14)    RADIOLOGY:  imaging below personally reviewed    Xray Chest 1 View- PORTABLE-Urgent (Xray Chest 1 View- PORTABLE-Urgent .) (22 @ 20:12) >  IMPRESSION:  Left chest wall pacemaker with leads overlying right atrium and right ventricle.  The lungs are clear. No pleural effusions or pneumothorax.  The heart size cannot accurately be assessed on this projection. Mitral annular calcifications.  Generalized osteopenia. Spinal degenerative osseous changes.  
Patient is seen and examined. Denies any chest pain, SOB, palpitations or dizziness. S/P PPM explant and Micra implant    PAST MEDICAL & SURGICAL HISTORY:  History of Hysterectomy  40 years ago    Benign Essential Hypertension    Hypothyroidism    Depression    Fibromyalgia    CVA (cerebral vascular accident)    Spinal stenosis    Essential hypertension    Anxiety    Depression, major    H/O: Hysterectomy  40 years ago    History of Tonsillectomy  childhood    S/P Laminectomy  Lumbar Laminectomy 2001    Cataract  2008, 2009        MEDICATIONS  (STANDING):  acetaminophen   IVPB .. 1000 milliGRAM(s) IV Intermittent once  clopidogrel Tablet 75 milliGRAM(s) Oral daily  levothyroxine 112 MICROGram(s) Oral daily  metoprolol succinate  milliGRAM(s) Oral two times a day  pantoprazole    Tablet 40 milliGRAM(s) Oral before breakfast  sodium chloride 0.9%. 1000 milliLiter(s) (75 mL/Hr) IV Continuous <Continuous>  vancomycin  IVPB 1000 milliGRAM(s) IV Intermittent every 24 hours    MEDICATIONS  (PRN):  acetaminophen     Tablet .. 650 milliGRAM(s) Oral every 6 hours PRN Mild Pain (1 - 3), Moderate Pain (4 - 6)  diazepam    Tablet 2.5 milliGRAM(s) Oral every 12 hours PRN anxiety  melatonin 3 milliGRAM(s) Oral at bedtime PRN Insomnia  oxyCODONE    IR 5 milliGRAM(s) Oral every 6 hours PRN Moderate Pain (4 - 6)    Vital Signs Last 24 Hrs  T(C): 36.7 (19 May 2022 10:18), Max: 36.8 (19 May 2022 05:30)  T(F): 98 (19 May 2022 10:18), Max: 98.2 (19 May 2022 05:30)  HR: 67 (19 May 2022 10:18) (55 - 72)  BP: 111/75 (19 May 2022 10:18) (111/75 - 151/67)  BP(mean): 90 (18 May 2022 18:36) (87 - 90)  RR: 16 (19 May 2022 10:18) (16 - 18)  SpO2: 99% (19 May 2022 10:18) (98% - 99%)      INTERPRETATION OF TELEMETRY: Sinus rhythm with occ. PVCs, couplets    LABS:                        12.7   6.97  )-----------( 161      ( 19 May 2022 07:45 )             37.0     05-19    135  |  102  |  27<H>  ----------------------------<  94  4.0   |  21<L>  |  0.78    Ca    9.2      19 May 2022 07:45  Phos  3.3     05-19  Mg     1.90     05-19          PT/INR - ( 18 May 2022 06:58 )   PT: 12.5 sec;   INR: 1.08 ratio         PTT - ( 18 May 2022 06:58 )  PTT:31.4 sec    I&O's Summary    18 May 2022 07:01  -  19 May 2022 07:00  --------------------------------------------------------  IN: 0 mL / OUT: 300 mL / NET: -300 mL          PHYSICAL EXAM:    GENERAL: In no apparent distress, well nourished, and hydrated.  HEART: Regular rate and rhythm; No murmurs, rubs, or gallops.  PULMONARY: Clear to auscultation and percussion.  No rales, wheezing, or rhonchi bilaterally.  ABDOMEN: Soft, Nontender, Nondistended; Bowel sounds present  EXTREMITIES:  2+ Peripheral Pulses, No clubbing, cyanosis, or edema          
Patient is a 89y old  Female who presents with a chief complaint of PPM site infection (14 May 2022 08:49)    f/u ppm infection    Interval History/ROS:  no fever. so far BC negative.  wound cultures negative.  mild headache this morning.  Remainder of ROS otherwise negative.    PAST MEDICAL & SURGICAL HISTORY:  Benign Essential Hypertension  Hypothyroidism  Depression  Fibromyalgia  CVA (cerebral vascular accident)  Spinal stenosis  Essential hypertension  Anxiety  H/O: Hysterectomy 40 years ago  History of Tonsillectomy, childhood  S/P Laminectomy  Lumbar Laminectomy   Cataract ,     Allergies  dust (Sneezing)  lactose (Unknown)  nonsteroidal anti-inflammatory agents (Unknown)  Originally Entered as [Unknown] reaction to [SULFITE] (Unknown)  penicillin (Rash)    ANTIMICROBIALS:  cefepime   IVPB 1000 every 24 hours (5/13x1)  vancomycin  IVPB 750 every 24 hours (-)    MEDICATIONS  (STANDING):  clopidogrel Tablet 75 daily  levothyroxine 112 daily  metoprolol succinate  two times a day  pantoprazole    Tablet 40 before breakfast    Vital Signs Last 24 Hrs  T(F): 98.1 (05-15-22 @ 09:00), Max: 98.4 (22 @ 23:30)  HR: 74 (05-15-22 @ 09:00)  BP: 125/86 (05-15-22 @ 09:00)  RR: 18 (05-15-22 @ 09:00)  SpO2: 99% (05-15-22 @ 09:00) (98% - 100%)    PHYSICAL EXAM:  Constitutional: non-toxic, no distress  HEAD/EYES: anicteric  ENT:  supple  Cardiovascular:   normal S1/S2, no murmur  Respiratory:  clear BS bilaterally,  GI:  soft, non-tender, normal bowel sounds  :  no chauhan  Musculoskeletal:  no synovitis, normal ROM  Neurologic: awake and alert,  no focal findings  Skin:  left chest with ppm site, dehisced area with serous drainage  Psychiatric:  awake, alert, appropriate mood                                 10.4   14.39 )-----------( 637      ( 15 May 2022 06:50 )             31.8 05-15    138  |  103  |  41  ----------------------------<  108  4.3   |  22  |  1.24  Ca    9.9      15 May 2022 06:50Phos  4.2     05-15Mg     2.10     05-15  TPro  7.2  /  Alb  4.8  /  TBili  1.3  /  DBili  x   /  AST  26  /  ALT  16  /  AlkPhos  87      WBC Count: 14.39 (05-15-22 @ 06:50)  WBC Count: 6.77 (22 @ 20:48)    Creatinine, Serum: 1.24 (05-15)  Creatinine, Serum: 0.85 ()    Urinalysis Basic - ( 13 May 2022 23:56 )  Color: Light Yellow / Appearance: Clear / S.009 / pH: x  Gluc: x / Ketone: Negative  / Bili: Negative / Urobili: <2 mg/dL   Blood: x / Protein: Negative / Nitrite: Negative   Leuk Esterase: Negative / RBC: x / WBC x   Sq Epi: x / Non Sq Epi: x / Bacteria: x    MICROBIOLOGY:  Culture - Other (collected 22 @ 23:35)  Source: Wound Wound  Preliminary Report (22 @ 21:18):    No growth    Culture - Other (collected 22 @ 23:35)  Source: Wound Wound  Preliminary Report (22 @ 21:18):    No growth    Culture - Blood (collected 22 @ 20:05)  Source: .Blood Blood-Venous  Preliminary Report (05-15-22 @ 01:02):    No growth to date.    Culture - Blood (collected 22 @ 19:55)  Source: .Blood Blood-Peripheral  Preliminary Report (05-15-22 @ 01:02):    No growth to date.    COVID-19 PCR: NotDetec (22 @ 21:20)  COVID-19 PCR: NotDetec (22 @ 17:14)    RADIOLOGY:  imaging below personally reviewed    Xray Chest 1 View- PORTABLE-Urgent (Xray Chest 1 View- PORTABLE-Urgent .) (22 @ 20:12) >  IMPRESSION:  Left chest wall pacemaker with leads overlying right atrium and right ventricle.  The lungs are clear. No pleural effusions or pneumothorax.  The heart size cannot accurately be assessed on this projection. Mitral annular calcifications.  Generalized osteopenia. Spinal degenerative osseous changes.  
Patient is a 89y old  Female who presents with a chief complaint of PPM site infection (14 May 2022 08:49)    f/u ppm infection    Interval History/ROS:  s/p PPM explantation yesterday.  minimal pain.  no fever.  all cultures remain negative. Remainder of ROS otherwise negative.    PAST MEDICAL & SURGICAL HISTORY:  Benign Essential Hypertension  Hypothyroidism  Depression  Fibromyalgia  CVA (cerebral vascular accident)  Spinal stenosis  Essential hypertension  Anxiety  H/O: Hysterectomy 40 years ago  History of Tonsillectomy, childhood  S/P Laminectomy  Lumbar Laminectomy   Cataract ,     Allergies  dust (Sneezing)  lactose (Unknown)  nonsteroidal anti-inflammatory agents (Unknown)  Originally Entered as [Unknown] reaction to [SULFITE] (Unknown)  penicillin (Rash)    ANTIMICROBIALS:  cefepime   IVPB 1000 every 24 hours (5/13x1)  vancomycin  IVPB 1000 every 24 hours (-)    MEDICATIONS  (STANDING):  acetaminophen   IVPB .. 1000 once  clopidogrel Tablet 75 daily  levothyroxine 112 daily  metoprolol succinate  two times a day  pantoprazole    Tablet 40 before breakfast    Vital Signs Last 24 Hrs  T(F): 98.4 (22 @ 05:01), Max: 98.4 (22 @ 05:01)  HR: 78 (22 @ 05:01)  BP: 141/71 (22 @ 05:01)  RR: 16 (22 @ 05:01)  SpO2: 99% (22 @ 05:01) (98% - 99%)    PHYSICAL EXAM:  Constitutional: non-toxic, no distress  HEAD/EYES: anicteric  ENT:  supple  Cardiovascular:   normal S1/S2, no murmur, PPM out, dressing c/d/i  Respiratory:  clear BS bilaterally,  GI:  soft, non-tender, normal bowel sounds  :  no chauhan  Musculoskeletal:  no synovitis  Neurologic: awake and alert,  no focal findings  Skin:  no rash  Psychiatric:  awake, alert, sad affect, anxious                                            14.5   8.49  )-----------( 208      ( 17 May 2022 07:20 )             41.5     130  |  99  |  20  ----------------------------<  223  4.3   |  20  |  0.90  Ca    9.4      17 May 2022 07:20Phos  3.9     05-17Mg     1.90     05-17    Creatinine, Serum: 0.90 (05-17)  Creatinine, Serum: 0.80 (05-16)  Creatinine, Serum: 1.24 (05-15)  Creatinine, Serum: 0.85 (05-13)    Urinalysis Basic - ( 13 May 2022 23:56 )  Color: Light Yellow / Appearance: Clear / S.009 / pH: x  Gluc: x / Ketone: Negative  / Bili: Negative / Urobili: <2 mg/dL   Blood: x / Protein: Negative / Nitrite: Negative   Leuk Esterase: Negative / RBC: x / WBC x   Sq Epi: x / Non Sq Epi: x / Bacteria: x    MICROBIOLOGY:  Vancomycin Level, Trough: 5.1 (05-15 @ 19:40)    Culture - Other (collected 22 @ 23:35)  Source: Wound Wound  Final Report (05-15-22 @ 17:21):    No growth    Culture - Other (collected 22 @ 23:35)  Source: Wound Wound  Final Report (05-15-22 @ 17:21):    No growth    Culture - Blood (collected 22 @ 20:05)  Source: .Blood Blood-Venous  Preliminary Report (05-15-22 @ 01:02):    No growth to date.    Culture - Blood (collected 22 @ 19:55)  Source: .Blood Blood-Peripheral  Preliminary Report (05-15-22 @ 01:02):    No growth to date.    COVID-19 PCR: NotDetec (22 @ 21:20)  COVID-19 PCR: NotDetec (22 @ 17:14)    RADIOLOGY:  imaging below personally reviewed    Xray Chest 1 View- PORTABLE-Urgent (Xray Chest 1 View- PORTABLE-Urgent .) (22 @ 20:12) >  IMPRESSION:  Left chest wall pacemaker with leads overlying right atrium and right ventricle.  The lungs are clear. No pleural effusions or pneumothorax.  The heart size cannot accurately be assessed on this projection. Mitral annular calcifications.  Generalized osteopenia. Spinal degenerative osseous changes.  
Examined at bedside NAD, no events reported. Denies HA, CP, SOB, N/V.     MEDICATIONS  (STANDING):  clopidogrel Tablet 75 milliGRAM(s) Oral daily  levothyroxine 112 MICROGram(s) Oral daily  metoprolol succinate  milliGRAM(s) Oral two times a day  pantoprazole    Tablet 40 milliGRAM(s) Oral before breakfast  vancomycin  IVPB 750 milliGRAM(s) IV Intermittent every 24 hours    MEDICATIONS  (PRN):  acetaminophen     Tablet .. 650 milliGRAM(s) Oral every 6 hours PRN Mild Pain (1 - 3), Moderate Pain (4 - 6)  diazepam    Tablet 2.5 milliGRAM(s) Oral every 12 hours PRN anxiety  melatonin 3 milliGRAM(s) Oral at bedtime PRN Insomnia  oxyCODONE    IR 5 milliGRAM(s) Oral every 6 hours PRN Moderate Pain (4 - 6)        CAPILLARY BLOOD GLUCOSE    I&O's Summary      Exam:    Vital Signs Last 24 Hrs  T(C): 36.8 (15 May 2022 13:00), Max: 36.9 (14 May 2022 23:30)  T(F): 98.2 (15 May 2022 13:00), Max: 98.4 (14 May 2022 23:30)  HR: 81 (15 May 2022 13:00) (60 - 82)  BP: 135/82 (15 May 2022 13:00) (122/78 - 159/82)  BP(mean): --  RR: 18 (15 May 2022 13:00) (18 - 18)  SpO2: 98% (15 May 2022 13:00) (98% - 100%)    CONSTITUTIONAL: NAD  HEENT: NCAT, PERRLA and symmetric, EOMI,   RESPIRATORY: no use of accessory muscles; CTA b/l, no wheezes, rales or rhonchi  CARDIOVASCULAR: RRRR, +S1S2 , mild redness on left chest PPM  GASTROINTESTINAL: Soft, non tender, non distended, no rebound, guarding;   MUSCULOSKELETAL: normal ROM x all extremities     SKIN: No rashes or ulcers noted; no subcutaneous nodules or induration palpable  NEUROLOGIC: No focal deficit, CN II-XII grossly intact  PSYCHIATRIC:  A+O x 3, mood and affect appropriate      LABS                                 15.1   5.93  )-----------( 180      ( 15 May 2022 11:40 )             43.8   05-15    138  |  103  |  41<H>  ----------------------------<  108<H>  4.3   |  22  |  1.24    Ca    9.9      15 May 2022 06:50  Phos  4.2     05-15  Mg     2.10     05-15    TPro  7.2  /  Alb  4.8  /  TBili  1.3<H>  /  DBili  x   /  AST  26  /  ALT  16  /  AlkPhos  87  05-13        
Interval history:  No o/n event  post PPM extraction site clean/dry, no hematoma/bleeding  NPO for anticipated Micra today      PAST MEDICAL & SURGICAL HISTORY:  History of Hysterectomy  40 years ago    Benign Essential Hypertension    Hypothyroidism    Depression    Fibromyalgia    CVA (cerebral vascular accident)    Spinal stenosis    Essential hypertension    Anxiety    Depression, major    H/O: Hysterectomy  40 years ago    History of Tonsillectomy  childhood    S/P Laminectomy  Lumbar Laminectomy 2001    Cataract  2008, 2009        MEDICATIONS  (STANDING):  acetaminophen   IVPB .. 1000 milliGRAM(s) IV Intermittent once  clopidogrel Tablet 75 milliGRAM(s) Oral daily  levothyroxine 112 MICROGram(s) Oral daily  metoprolol succinate  milliGRAM(s) Oral two times a day  pantoprazole    Tablet 40 milliGRAM(s) Oral before breakfast  sodium chloride 0.9%. 1000 milliLiter(s) (75 mL/Hr) IV Continuous <Continuous>  vancomycin  IVPB 1000 milliGRAM(s) IV Intermittent every 24 hours    MEDICATIONS  (PRN):  acetaminophen     Tablet .. 650 milliGRAM(s) Oral every 6 hours PRN Mild Pain (1 - 3), Moderate Pain (4 - 6)  diazepam    Tablet 2.5 milliGRAM(s) Oral every 12 hours PRN anxiety  melatonin 3 milliGRAM(s) Oral at bedtime PRN Insomnia  oxyCODONE    IR 5 milliGRAM(s) Oral every 6 hours PRN Moderate Pain (4 - 6)            Vital Signs Last 24 Hrs  T(C): 36.6 (18 May 2022 04:51), Max: 36.6 (18 May 2022 04:51)  T(F): 97.8 (18 May 2022 04:51), Max: 97.8 (18 May 2022 04:51)  HR: 68 (18 May 2022 04:51) (68 - 75)  BP: 135/62 (18 May 2022 04:51) (108/81 - 144/78)  BP(mean): --  RR: 16 (18 May 2022 04:51) (16 - 16)  SpO2: 99% (18 May 2022 04:51) (98% - 99%)            INTERPRETATION OF TELEMETRY: SR at 50s-60s and 1st degree AVB        LABS:                        13.3   7.34  )-----------( 158      ( 18 May 2022 06:58 )             38.9     05-18    135  |  102  |  28<H>  ----------------------------<  99  4.1   |  22  |  0.90    Ca    9.4      18 May 2022 06:58  Phos  3.2     05-18  Mg     2.00     05-18          PT/INR - ( 18 May 2022 06:58 )   PT: 12.5 sec;   INR: 1.08 ratio         PTT - ( 18 May 2022 06:58 )  PTT:31.4 sec    I&O's Summary    BNP  RADIOLOGY & ADDITIONAL STUDIES:      PHYSICAL EXAM:    GENERAL: In no apparent distress, well nourished, and hydrated.  HEART: Regular rate and rhythm; systolic murmurs, rubs, or gallops.  + L ACW PPM extraction wound with Dermabond +minimal erythematic , no hematoma, no bleeding  PULMONARY: Clear to auscultation and percussion.  No rales, wheezing, or rhonchi bilaterally.  ABDOMEN: Soft, Nontender, Nondistended; Bowel sounds present  EXTREMITIES:  Peripheral Pulses present,  No clubbing, cyanosis, or edema  NEUROLOGICAL: Grossly nonfocal        
Patient is a 89y old  Female who presents with a chief complaint of PPM site infection (14 May 2022 08:49)    f/u ppm infection    Interval History/ROS:  no fever. BC and wound cx so so far negative.  feeling anxious, sad.  Remainder of ROS otherwise negative.    PAST MEDICAL & SURGICAL HISTORY:  Benign Essential Hypertension  Hypothyroidism  Depression  Fibromyalgia  CVA (cerebral vascular accident)  Spinal stenosis  Essential hypertension  Anxiety  H/O: Hysterectomy 40 years ago  History of Tonsillectomy, childhood  S/P Laminectomy  Lumbar Laminectomy   Cataract ,     Allergies  dust (Sneezing)  lactose (Unknown)  nonsteroidal anti-inflammatory agents (Unknown)  Originally Entered as [Unknown] reaction to [SULFITE] (Unknown)  penicillin (Rash)    ANTIMICROBIALS:  cefepime   IVPB 1000 every 24 hours (5/13x1)  vancomycin  IVPB 750 every 24 hours (-)    MEDICATIONS  (STANDING):  acetaminophen   IVPB .. 1000 once  clopidogrel Tablet 75 daily  levothyroxine 112 daily  metoprolol succinate  two times a day  pantoprazole    Tablet 40 before breakfast    Vital Signs Last 24 Hrs  T(F): 98 (22 @ 09:00), Max: 98.2 (05-15-22 @ 13:00)  HR: 68 (22 @ 09:00)  BP: 134/72 (22 @ 09:00)  RR: 18 (22 @ 09:00)  SpO2: 98% (22 @ 09:00) (97% - 99%)  Wt(kg): --    PHYSICAL EXAM:  Constitutional: non-toxic, no distress  HEAD/EYES: anicteric  ENT:  supple  Cardiovascular:   normal S1/S2, no murmur  Respiratory:  clear BS bilaterally,  GI:  soft, non-tender, normal bowel sounds  :  no chauhan  Musculoskeletal:  no synovitis, normal ROM  Neurologic: awake and alert,  no focal findings  Skin:  left chest with ppm site, dehisced area, more closed and dry now  Psychiatric:  awake, alert, sad affect, anxious                            13.7   4.92  )-----------( 162      ( 16 May 2022 07:05 )             39.0 05-16    133  |  100  |  16  ----------------------------<  94  3.6   |  22  |  0.80  Ca    9.4      16 May 2022 07:05Phos  3.8     05-16Mg     1.90     -16    Creatinine, Serum: 1.24 (05-15)  Creatinine, Serum: 0.85 ()    Urinalysis Basic - ( 13 May 2022 23:56 )  Color: Light Yellow / Appearance: Clear / S.009 / pH: x  Gluc: x / Ketone: Negative  / Bili: Negative / Urobili: <2 mg/dL   Blood: x / Protein: Negative / Nitrite: Negative   Leuk Esterase: Negative / RBC: x / WBC x   Sq Epi: x / Non Sq Epi: x / Bacteria: x    MICROBIOLOGY:  Vancomycin Level, Trough: 5.1 (05-15 @ 19:40)    Culture - Other (collected 22 @ 23:35)  Source: Wound Wound  Final Report (05-15-22 @ 17:21):    No growth    Culture - Other (collected 22 @ 23:35)  Source: Wound Wound  Final Report (05-15-22 @ 17:21):    No growth    Culture - Blood (collected 22 @ 20:05)  Source: .Blood Blood-Venous  Preliminary Report (05-15-22 @ 01:02):    No growth to date.    Culture - Blood (collected 22 @ 19:55)  Source: .Blood Blood-Peripheral  Preliminary Report (05-15-22 @ 01:02):    No growth to date.    COVID-19 PCR: NotDetec (22 @ 21:20)  COVID-19 PCR: NotDetec (22 @ 17:14)    RADIOLOGY:  imaging below personally reviewed    Xray Chest 1 View- PORTABLE-Urgent (Xray Chest 1 View- PORTABLE-Urgent .) (22 @ 20:12) >  IMPRESSION:  Left chest wall pacemaker with leads overlying right atrium and right ventricle.  The lungs are clear. No pleural effusions or pneumothorax.  The heart size cannot accurately be assessed on this projection. Mitral annular calcifications.  Generalized osteopenia. Spinal degenerative osseous changes.  
Paul Cabrera  [phone]  All Cardiology service information can be found 24/7 on [email or web address], password: [email or web address]    Overnight Events: No events overnight. Pt feels well. Denies chest pain, sob, or palpitations.      Current Meds:  acetaminophen     Tablet .. 650 milliGRAM(s) Oral every 6 hours PRN  apixaban 2.5 milliGRAM(s) Oral two times a day  cefepime   IVPB 1000 milliGRAM(s) IV Intermittent every 24 hours  diazepam    Tablet 2.5 milliGRAM(s) Oral every 12 hours PRN  levothyroxine 112 MICROGram(s) Oral daily  melatonin 3 milliGRAM(s) Oral at bedtime PRN  metoprolol succinate  milliGRAM(s) Oral two times a day  oxyCODONE    IR 5 milliGRAM(s) Oral every 6 hours PRN  pantoprazole    Tablet 40 milliGRAM(s) Oral before breakfast      Vitals:  T(F): 97.9 (05-14), Max: 98.2 (05-13)  HR: 71 (05-14) (70 - 83)  BP: 150/80 (05-14) (150/80 - 180/92)  RR: 18 (05-14)  SpO2: 99% (05-14)  I&O's Summary      Physical Exam:  Appearance: No acute distress  Eyes: pink conjunctiva  HEENT: Normal oral mucosa  Cardiovascular: S1, S2, RRR, no JVD, no edema   Respiratory: Clear to auscultation bilaterally  Gastrointestinal: soft, non-tender, non-distended with normal bowel sounds  Musculoskeletal: No clubbing; no joint deformity   Neurologic: Non-focal  Lymphatic: No lymphadenopathy  Psychiatry: mood & affect appropriate  Skin: No rashes, ecchymoses, or cyanosis                          14.8   6.77  )-----------( 194      ( 13 May 2022 20:48 )             42.3     05-13    131<L>  |  97<L>  |  16  ----------------------------<  110<H>  4.1   |  20<L>  |  0.85    Ca    9.8      13 May 2022 20:48  Mg     1.90     05-13    TPro  7.2  /  Alb  4.8  /  TBili  1.3<H>  /  DBili  x   /  AST  26  /  ALT  16  /  AlkPhos  87  05-13    PT/INR - ( 13 May 2022 20:48 )   PT: 14.4 sec;   INR: 1.24 ratio         PTT - ( 13 May 2022 20:48 )  PTT:38.6 sec      Interpretation of Telemetry: A-paced  
interval history::   No o/n event.  S/p PPM explant yesterday, dressing C/D/I  Pt. feels good and no complaints.      PAST MEDICAL & SURGICAL HISTORY:  History of Hysterectomy  40 years ago    Benign Essential Hypertension    Hypothyroidism    Depression    Fibromyalgia    CVA (cerebral vascular accident)    Spinal stenosis    Essential hypertension    Anxiety    Depression, major    H/O: Hysterectomy  40 years ago    History of Tonsillectomy  childhood    S/P Laminectomy  Lumbar Laminectomy 2001    Cataract  2008, 2009        MEDICATIONS  (STANDING):  acetaminophen   IVPB .. 1000 milliGRAM(s) IV Intermittent once  clopidogrel Tablet 75 milliGRAM(s) Oral daily  levothyroxine 112 MICROGram(s) Oral daily  metoprolol succinate  milliGRAM(s) Oral two times a day  pantoprazole    Tablet 40 milliGRAM(s) Oral before breakfast  vancomycin  IVPB 1000 milliGRAM(s) IV Intermittent every 24 hours    MEDICATIONS  (PRN):  acetaminophen     Tablet .. 650 milliGRAM(s) Oral every 6 hours PRN Mild Pain (1 - 3), Moderate Pain (4 - 6)  diazepam    Tablet 2.5 milliGRAM(s) Oral every 12 hours PRN anxiety  melatonin 3 milliGRAM(s) Oral at bedtime PRN Insomnia  oxyCODONE    IR 5 milliGRAM(s) Oral every 6 hours PRN Moderate Pain (4 - 6)            Vital Signs Last 24 Hrs  T(C): 36.9 (17 May 2022 05:01), Max: 36.9 (17 May 2022 05:01)  T(F): 98.4 (17 May 2022 05:01), Max: 98.4 (17 May 2022 05:01)  HR: 78 (17 May 2022 05:01) (71 - 91)  BP: 141/71 (17 May 2022 05:01) (135/75 - 141/71)  BP(mean): --  RR: 16 (17 May 2022 05:01) (16 - 18)  SpO2: 99% (17 May 2022 05:01) (98% - 99%)            INTERPRETATION OF TELEMETRY:  SR 60-70s        LABS:                        14.5   8.49  )-----------( 208      ( 17 May 2022 07:20 )             41.5     05-17    130<L>  |  99  |  20  ----------------------------<  223<H>  4.3   |  20<L>  |  0.90    Ca    9.4      17 May 2022 07:20  Phos  3.9     05-17  Mg     1.90     05-17          PT/INR - ( 16 May 2022 07:05 )   PT: 13.0 sec;   INR: 1.12 ratio         PTT - ( 16 May 2022 07:05 )  PTT:34.8 sec    I&O's Summary    16 May 2022 07:01  -  17 May 2022 07:00  --------------------------------------------------------  IN: 400 mL / OUT: 0 mL / NET: 400 mL      BNP  RADIOLOGY & ADDITIONAL STUDIES:      PHYSICAL EXAM:    GENERAL: In no apparent distress, well nourished, and hydrated.   lesions  HEART: Regular rate and rhythm; systolic murmurs, rubs, or gallops.  PULMONARY: Clear to auscultation and percussion.  No rales, wheezing, or rhonchi bilaterally. + L dressing clean/dry  ABDOMEN: Soft, Nontender, Nondistended; Bowel sounds present  EXTREMITIES: Peripheral Pulses present, No clubbing, cyanosis, or edema  NEUROLOGICAL: Grossly nonfocal        
Examined at bedside NAD, no events reported. Denies HA, CP, SOB, N/V.     MEDICATIONS  (STANDING):  apixaban 2.5 milliGRAM(s) Oral two times a day  levothyroxine 112 MICROGram(s) Oral daily  metoprolol succinate  milliGRAM(s) Oral two times a day  pantoprazole    Tablet 40 milliGRAM(s) Oral before breakfast  vancomycin  IVPB 750 milliGRAM(s) IV Intermittent every 24 hours    MEDICATIONS  (PRN):  acetaminophen     Tablet .. 650 milliGRAM(s) Oral every 6 hours PRN Mild Pain (1 - 3), Moderate Pain (4 - 6)  diazepam    Tablet 2.5 milliGRAM(s) Oral every 12 hours PRN anxiety  melatonin 3 milliGRAM(s) Oral at bedtime PRN Insomnia  oxyCODONE    IR 5 milliGRAM(s) Oral every 6 hours PRN Moderate Pain (4 - 6)      CAPILLARY BLOOD GLUCOSE    I&O's Summary      Exam:    Vital Signs Last 24 Hrs  T(C): 36.6 (14 May 2022 11:52), Max: 36.8 (13 May 2022 17:38)  T(F): 97.9 (14 May 2022 11:52), Max: 98.2 (13 May 2022 17:38)  HR: 87 (14 May 2022 11:52) (70 - 87)  BP: 142/95 (14 May 2022 11:52) (142/95 - 180/92)  BP(mean): --  RR: 19 (14 May 2022 11:52) (15 - 19)  SpO2: 98% (14 May 2022 11:52) (98% - 99%)    CONSTITUTIONAL: NAD  HEENT: NCAT, PERRLA and symmetric, EOMI,   RESPIRATORY: no use of accessory muscles; CTA b/l, no wheezes, rales or rhonchi  CARDIOVASCULAR: RRRR, +S1S2 , mild redness and drainage noted on left chest PPM  GASTROINTESTINAL: Soft, non tender, non distended, no rebound, guarding;   MUSCULOSKELETAL: normal ROM x all extremities     SKIN: No rashes or ulcers noted; no subcutaneous nodules or induration palpable  NEUROLOGIC: No focal deficit, CN II-XII grossly intact  PSYCHIATRIC:  A+O x 3, mood and affect appropriate      LABS                          14.8   6.77  )-----------( 194      ( 13 May 2022 20:48 )             42.3       05-13    131<L>  |  97<L>  |  16  ----------------------------<  110<H>  4.1   |  20<L>  |  0.85    Ca    9.8      13 May 2022 20:48  Mg     1.90     05-13    TPro  7.2  /  Alb  4.8  /  TBili  1.3<H>  /  DBili  x   /  AST  26  /  ALT  16  /  AlkPhos  87  05-13      
[facility] Division of Hospital Medicine  Myriam Man MD  Pager [phone]      Patient is a 89y old  Female who presents with a chief complaint of PPM site infection (16 May 2022 12:31)      SUBJECTIVE / OVERNIGHT EVENTS:    no acute event o/n     ADDITIONAL REVIEW OF SYSTEMS:    RESPIRATORY: No cough, wheezing, chills or hemoptysis; No shortness of breath  CARDIOVASCULAR: No chest pain, palpitations, dizziness, or leg swelling  GASTROINTESTINAL: No abdominal or epigastric pain. No nausea, vomiting, or hematemesis; No diarrhea or constipation. No melena or hematochezia.      MEDICATIONS  (STANDING):  acetaminophen   IVPB .. 1000 milliGRAM(s) IV Intermittent once  clopidogrel Tablet 75 milliGRAM(s) Oral daily  levothyroxine 112 MICROGram(s) Oral daily  metoprolol succinate  milliGRAM(s) Oral two times a day  pantoprazole    Tablet 40 milliGRAM(s) Oral before breakfast  vancomycin  IVPB 1000 milliGRAM(s) IV Intermittent every 24 hours    MEDICATIONS  (PRN):  acetaminophen     Tablet .. 650 milliGRAM(s) Oral every 6 hours PRN Mild Pain (1 - 3), Moderate Pain (4 - 6)  diazepam    Tablet 2.5 milliGRAM(s) Oral every 12 hours PRN anxiety  melatonin 3 milliGRAM(s) Oral at bedtime PRN Insomnia  oxyCODONE    IR 5 milliGRAM(s) Oral every 6 hours PRN Moderate Pain (4 - 6)      CAPILLARY BLOOD GLUCOSE        I&O's Summary      PHYSICAL EXAM:  Vital Signs Last 24 Hrs  T(C): 36.8 (16 May 2022 13:00), Max: 36.8 (16 May 2022 13:00)  T(F): 98.3 (16 May 2022 13:00), Max: 98.3 (16 May 2022 13:00)  HR: 71 (16 May 2022 13:00) (64 - 85)  BP: 139/71 (16 May 2022 13:00) (134/72 - 154/72)  BP(mean): --  RR: 18 (16 May 2022 13:00) (17 - 18)  SpO2: 99% (16 May 2022 13:00) (97% - 99%)    CONSTITUTIONAL: NAD,  EYES: PERRLA; conjunctiva and sclera clear  ENMT: Moist oral mucosa, no pharyngeal injection or exudates;   NECK: Supple, no palpable masses;  RESPIRATORY: Normal respiratory effort; lungs are clear to auscultation bilaterally. L chest PPM dressing c/d/i   CARDIOVASCULAR: Regular rate and rhythm, normal S1 and S2, no murmur/rub/gallop; No lower extremity edema; Peripheral pulses are 2+ bilaterally  ABDOMEN: Nontender to palpation, normoactive bowel sounds, no rebound/guarding;   MUSCLOSKELETAL:   no clubbing or cyanosis of digits; no joint swelling or tenderness to palpation  PSYCH: A+O to person, place, and time; affect appropriate  NEUROLOGY: CN 2-12 are intact and symmetric; no gross sensory deficits;   SKIN: No rashes;     LABS:                        13.7   4.92  )-----------( 162      ( 16 May 2022 07:05 )             39.0     05-16    133<L>  |  100  |  16  ----------------------------<  94  3.6   |  22  |  0.80    Ca    9.4      16 May 2022 07:05  Phos  3.8     05-16  Mg     1.90     05-16      PT/INR - ( 16 May 2022 07:05 )   PT: 13.0 sec;   INR: 1.12 ratio         PTT - ( 16 May 2022 07:05 )  PTT:34.8 sec          Culture - Other (collected 13 May 2022 23:35)  Source: Wound Wound  Final Report (15 May 2022 17:21):    No growth    Culture - Other (collected 13 May 2022 23:35)  Source: Wound Wound  Final Report (15 May 2022 17:21):    No growth    Culture - Blood (collected 13 May 2022 20:05)  Source: .Blood Blood-Venous  Preliminary Report (15 May 2022 01:02):    No growth to date.    Culture - Blood (collected 13 May 2022 19:55)  Source: .Blood Blood-Peripheral  Preliminary Report (15 May 2022 01:02):    No growth to date.        RADIOLOGY & ADDITIONAL TESTS:  Results Reviewed:   Imaging Personally Reviewed:  Electrocardiogram Personally Reviewed:    COORDINATION OF CARE:  Care Discussed with Consultants/Other Providers [Y/N]:  Prior or Outpatient Records Reviewed [Y/N]:

## 2022-05-20 NOTE — DISCHARGE NOTE NURSING/CASE MANAGEMENT/SOCIAL WORK - NSDCFUADDAPPT_GEN_ALL_CORE_FT
You have an appointment with the electrophysiologist on 5/31/2022 @ 10:00, The office is located on the 4th floor of the Oncology Building here at [facility]. [phone].     Please follow up with your primary care doctor.

## 2022-05-21 LAB
CULTURE RESULTS: SIGNIFICANT CHANGE UP
SPECIMEN SOURCE: SIGNIFICANT CHANGE UP

## 2022-05-28 NOTE — DISCHARGE NOTE NURSING/CASE MANAGEMENT/SOCIAL WORK - NSDCPEELIQUIS_GEN_ALL_CORE
Apixaban/Eliquis - Compliance/Apixaban/Eliquis - Dietary Advice/Apixaban/Eliquis - Follow up monitoring/Apixaban/Eliquis - Potential for adverse drug reactions and interactions Flap Thinning Complex Repair Preamble Text (Leave Blank If You Do Not Want): An incision was made along the previous flap suture line. Undermining was performed beneath the flap and redundant tissue was removed to restore the normal contour of the skin.

## 2022-05-31 ENCOUNTER — APPOINTMENT (OUTPATIENT)
Dept: ELECTROPHYSIOLOGY | Facility: CLINIC | Age: 87
End: 2022-05-31

## 2022-06-15 ENCOUNTER — APPOINTMENT (OUTPATIENT)
Dept: ELECTROPHYSIOLOGY | Facility: CLINIC | Age: 87
End: 2022-06-15
Payer: MEDICARE

## 2022-06-15 PROCEDURE — 99024 POSTOP FOLLOW-UP VISIT: CPT

## 2022-06-17 RX ORDER — CEFADROXIL 500 MG/1
500 CAPSULE ORAL
Qty: 10 | Refills: 0 | Status: DISCONTINUED | COMMUNITY
Start: 2022-04-11 | End: 2022-06-17

## 2022-07-11 NOTE — ED PROVIDER NOTE - ENMT NEGATIVE STATEMENT, MLM
[facility]OUN[facility]  INTERNAL MEDICINE OFFICE VISIT    CHIEF COMPLAINT:    Chief Complaint   Patient presents with   • Follow-up     6 month       HISTORY OF PRESENT ILLNESS:    Hemal Paiz is a 68 year old male who presents today for follow up of active medical problems.     Lung nodule - 4.1 mm in the left lower lobe. Had CT chest in Jan 2022. He smokes 1.5 packs daily for more than 50 years.      Hypertension - takes losartan 50 mg daily     Hypothyroidism - takes levothyroxine 150 mcg daily.      Prediabetes - on diet management     Per chart review, the pt has history of kidney stones and     History of yperplastic colon polyps. Pt declines colonoscopy.     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     Patient Care Team:  Raphael Cuevas DO as PCP - General (Internal Medicine)     PROBLEM LIST:    Patient Active Problem List   Diagnosis   • h/o L nephrolithiasis   • Unspecified hypothyroidism   • Reflux esophagitis   • Sebaceous cyst   • Hypertension       HISTORIES:    ALLERGIES:   Allergen Reactions   • Penicillins ANAPHYLAXIS     anaphylaxis     Current Outpatient Medications   Medication Sig Dispense Refill   • levothyroxine 150 MCG tablet Take 1 tablet by mouth daily. 90 tablet 1   • losartan (COZAAR) 50 MG tablet Take 1 tablet by mouth daily. 90 tablet 1   • loratadine (Claritin) 10 MG tablet Take 1 tablet by mouth daily. 90 tablet 3   • fluticasone (FLONASE) 50 MCG/ACT nasal spray Spray 2 sprays in each nostril daily. 16 g 12     Current Facility-Administered Medications   Medication Dose Route Frequency Provider Last Rate Last Admin   • cyanocobalamin injection 1,000 mcg  1,000 mcg Intramuscular Once Toño Roman MD         Facility-Administered Medications Ordered in Other Visits   Medication Dose Route Frequency Provider Last Rate Last Admin   • iopamidol (ISOVUE-300) 61 % injection 100 mL  100 mL Intravenous Once Geoffrey Hardwick DO         Immunization History    Administered Date(s) Administered   • COVID-19 12Y+ Pfizer-BioNtech - Requires Dilution 04/01/2021, 04/29/2021, 12/26/2021   • Influenza, Quadrivalent, Adjuvanted 10/24/2021   • Influenza, high dose quadrivalent, preservative-free 09/06/2020   • Influenza, high dose seasonal, preservative-free 11/02/2019   • Influenza, seasonal, injectable, preservative free 01/07/2017, 10/21/2017   • Influenza, seasonal, injectable, trivalent 10/21/2017   • Pneumococcal Conjugate 13 Valent Vacc (Prevnar 13) 12/17/2018   • Pneumococcal Polysaccharide Vacc (Pneumovax 23) 12/27/2019   • TD Adult, Adsorbed 04/15/2002   • Td:Adult type tetanus/diphtheria 04/15/2002   • influenza, trivalent, adjuvanted 10/06/2018     Past Medical History:   Diagnosis Date   • Calculus of kidney    • Colon polyps 03/16/2009    Hyperplastic polyps-Dr. Garsia-5 year recall   • Eczema as a child    • Mitral valve disorders(424.0)     Mitral Valve Prolapse   • Reflux esophagitis    • Unspecified hypothyroidism    • Wears dentures      Past Surgical History:   Procedure Laterality Date   • Colonoscopy w/ polypectomy  03/16/2009    5 year recall, Dr. Paddy Garsia - [facility] -Hyperplastic polyps noted     Social History     Tobacco Use   • Smoking status: Current Every Day Smoker     Packs/day: 1.00     Years: 36.00     Pack years: 36.00     Types: Cigarettes     Start date: 11/1/2017   • Smokeless tobacco: Never Used   Substance Use Topics   • Alcohol use: Yes     Comment: socially    • Drug use: No     Family History   Problem Relation Age of Onset   • Cancer Father         dec age 77   • Hypertension Mother    • Diabetes Other         grandmother       I have reviewed the patient's medications and allergies, past medical, surgical, social and family history, updating these as appropriate.  See Histories section of the electronic medical record for a display of this information.    REVIEW OF SYSTEMS:    Constitutional: Negative for fever.   Skin: Positive for  rash on the right knee.   HEENT: Negative for eye drainage, rhinorrhea  Respiratory: Negative for cough or shortness of breath.    Cardiovascular: Negative for chest pain, chest pressure, palpitations  Gastrointestinal: Negative for nausea, vomiting, or abdominal pain.   Genitourinary: Negative for dysuria, urgency, frequency  Extremities: Negative for joint swelling or joint pain.  Neurologic: Negative for change in sensory or motor function.  Hematological: Negative for bleeding  Psychiatric: Negative for change in affect, change in mentation    PHYSICAL EXAM:  Vital Signs:    Visit Vitals  /70 (BP Location: RUE - Right upper extremity, Patient Position: Sitting, Cuff Size: Regular)   Pulse 84   Temp 97.8 °F (36.6 °C) (Oral)   Resp 16   Ht 5' 11\" (1.803 m)   Wt 80 kg (176 lb 6.4 oz)   SpO2 97%   BMI 24.60 kg/m²     Pulse Ox Interpretation:  Within normal limits.  General:   Alert, cooperative, conversive in no acute distress.  Skin:  Scabs on the right knee. No discharge or erythema.   Head:  Normocephalic, atraumatic.   Neck:  Trachea is midline.   Eyes:  Normal conjunctivae and sclerae. Extraocular movements intact.  ENT:  Wearing a mask.   Cardiovascular:  Symmetrical pulses.  Regular rate and rhythm without murmur.  Respiratory:  Normal respiratory effort.  Clear to auscultation.  No wheezes, rales or rhonchi.  Gastrointestinal:  Soft and non tender.   Musculoskeletal:  No deformity or edema. No tenderness to palpation.  Neurologic:   Alert. No focal deficits or lateralizing signs.  Psychiatric:   Cooperative.  Appropriate mood and affect.    LABORATORY DATA:    Lab Results   Component Value Date    SODIUM 138 07/05/2022    SODIUM 139 05/16/2022    POTASSIUM 4.3 07/05/2022    POTASSIUM 4.6 05/16/2022    CHLORIDE 104 07/05/2022    CHLORIDE 106 05/16/2022    CO2 26 07/05/2022    CO2 25 05/16/2022    BUN 22 (H) 07/05/2022    BUN 16 05/16/2022    CREATININE 0.84 07/05/2022    CREATININE 0.86 05/16/2022     GLUCOSE 91 07/05/2022    GLUCOSE 95 05/16/2022     Lab Results   Component Value Date    WBC 6.6 07/05/2022    WBC 6.4 05/16/2022    HCT 51.3 (H) 07/05/2022    HCT 49.2 05/16/2022    HGB 17.0 07/05/2022    HGB 16.5 05/16/2022     07/05/2022     05/16/2022     Lab Results   Component Value Date    GLUCOSE 91 07/05/2022    GLUCOSE 95 05/16/2022    SODIUM 138 07/05/2022    SODIUM 139 05/16/2022    POTASSIUM 4.3 07/05/2022    POTASSIUM 4.6 05/16/2022    CHLORIDE 104 07/05/2022    CHLORIDE 106 05/16/2022    BUN 22 (H) 07/05/2022    BUN 16 05/16/2022    CREATININE 0.84 07/05/2022    CREATININE 0.86 05/16/2022    CALCIUM 8.6 07/05/2022    CALCIUM 9.0 05/16/2022    ALBUMIN 3.8 05/16/2022    ALBUMIN 4.1 01/03/2022    AST 16 05/16/2022    AST 15 01/03/2022    ALKPT 47 05/16/2022    ALKPT 55 01/03/2022    GPT 12 05/16/2022    GPT 14 01/03/2022    ANIONGAP 12 07/05/2022    ANIONGAP 13 05/16/2022    BCRAT 26 (H) 07/05/2022    BCRAT 19 05/16/2022    GLOB 3.2 05/16/2022    GLOB 3.2 01/03/2022    AGR 1.2 05/16/2022    AGR 1.3 01/03/2022     Hemoglobin A1C (%)   Date Value   07/05/2022 5.9 (H)   05/16/2022 5.8 (H)     No results found for: INR  TSH (mcUnits/mL)   Date Value   07/05/2022 0.831   05/16/2022 1.033     Lab Results   Component Value Date    COL YUE (A) 06/14/2019    UAPP CLEAR 06/14/2019    USPG 1.025 06/14/2019    UPH 6.5 06/14/2019    UPROT NEGATIVE 06/14/2019    UGLU NEGATIVE 06/14/2019    UKET NEGATIVE 06/14/2019    UBILI NEGATIVE 06/14/2019    URBC NEGATIVE 06/14/2019    UNITR NEGATIVE 06/14/2019    UROB 1.0 06/14/2019    UWBC NEGATIVE 06/14/2019     Lab Results   Component Value Date    CHOLESTEROL 191 07/05/2022    CHOLESTEROL 206 (H) 01/03/2022    HDL 86 07/05/2022    HDL 94 01/03/2022    CALCLDL 88 07/05/2022    CALCLDL 97 01/03/2022    TRIGLYCERIDE 83 07/05/2022    TRIGLYCERIDE 73 01/03/2022     Lab Results   Component Value Date    AST 16 05/16/2022    AST 15 01/03/2022    GPT 12 05/16/2022     GPT 14 01/03/2022    ALKPT 47 05/16/2022    ALKPT 55 01/03/2022    BILIRUBIN 0.6 05/16/2022    BILIRUBIN 0.6 01/03/2022     PSA, Total (ng/mL)   Date Value   12/20/2019 1.51     Prostate Specific Antigen (ng/mL)   Date Value   12/28/2020 1.39     No results found for: CPK    DEPRESSION ASSESSMENT/PLAN:  Recent Review Flowsheet Data     Date 7/11/2022    Adult PHQ 2 Score 0    Adult PHQ 2 Interpretation No further screening needed    Little interest or pleasure in activity? Not at all    Feeling down, depressed or hopeless? Not at all        Depression screening is negative no further plan needed.      ASSESSMENT / PLAN    Hypothyroidism, unspecified type  - Recent TSH at goal. Continue current therapy.  - levothyroxine 150 MCG tablet; Take 1 tablet by mouth daily.  Dispense: 90 tablet; Refill: 1  - COMPREHENSIVE METABOLIC PANEL; Future  - THYROID STIMULATING HORMONE REFLEX; Future  - GLYCOHEMOGLOBIN; Future    Primary hypertension  - BP well controlled. Today's /70.  - losartan (COZAAR) 50 MG tablet; Take 1 tablet by mouth daily.  Dispense: 90 tablet; Refill: 1  - COMPREHENSIVE METABOLIC PANEL; Future  - THYROID STIMULATING HORMONE REFLEX; Future  - GLYCOHEMOGLOBIN; Future    Prediabetes  - Discussed low carb diet. Avoid sweets.   - Check A1c and fasting glucose again in 6 months.   - COMPREHENSIVE METABOLIC PANEL; Future  - THYROID STIMULATING HORMONE REFLEX; Future  - GLYCOHEMOGLOBIN; Future    Lung nodule  - Yearly CT chest recommended. Next due in Jan 2023.        Orders Placed This Encounter   • Comprehensive Metabolic Panel   • Thyroid Stimulating Hormone Reflex   • Glycohemoglobin   • levothyroxine 150 MCG tablet   • losartan (COZAAR) 50 MG tablet       Return in about 6 months (around 1/11/2023) for Follow up for chronic problems.    Instructions provided as documented in the after visit summary.    The patient indicated understanding of the diagnosis and agreed with the plan of care.    Ears: no ear pain and no hearing problems. Nose: no nasal congestion and no nasal drainage. Mouth/Throat: no dysphagia, no hoarseness and no throat pain. Neck: no lumps, no pain, no stiffness and no swollen glands.

## 2022-08-26 ENCOUNTER — APPOINTMENT (OUTPATIENT)
Dept: ELECTROPHYSIOLOGY | Facility: CLINIC | Age: 87
End: 2022-08-26

## 2022-09-23 ENCOUNTER — APPOINTMENT (OUTPATIENT)
Dept: ELECTROPHYSIOLOGY | Facility: CLINIC | Age: 87
End: 2022-09-23

## 2022-10-14 RX ORDER — LEVOCETIRIZINE DIHYDROCHLORIDE 5 MG/1
5 TABLET ORAL
Qty: 30 | Refills: 0 | Status: DISCONTINUED | COMMUNITY
Start: 2018-03-15 | End: 2022-10-14

## 2022-10-14 RX ORDER — MOMETASONE FUROATE 1 MG/G
0.1 CREAM TOPICAL
Refills: 0 | Status: ACTIVE | COMMUNITY

## 2022-10-14 RX ORDER — OXYCODONE HYDROCHLORIDE AND ACETAMINOPHEN 5; 325 MG/1; MG/1
5-325 TABLET ORAL
Refills: 0 | Status: ACTIVE | COMMUNITY

## 2022-10-14 RX ORDER — DIAZEPAM 5 MG/1
5 TABLET ORAL 3 TIMES DAILY
Refills: 0 | Status: ACTIVE | COMMUNITY
Start: 2018-06-18

## 2022-10-14 RX ORDER — HYDROCHLOROTHIAZIDE 12.5 MG/1
12.5 CAPSULE ORAL
Refills: 0 | Status: ACTIVE | COMMUNITY

## 2022-10-14 RX ORDER — METOPROLOL TARTRATE 100 MG/1
100 TABLET, FILM COATED ORAL TWICE DAILY
Refills: 0 | Status: ACTIVE | COMMUNITY

## 2022-10-14 RX ORDER — APIXABAN 2.5 MG/1
2.5 TABLET, FILM COATED ORAL TWICE DAILY
Refills: 0 | Status: ACTIVE | COMMUNITY

## 2022-10-14 RX ORDER — CLOPIDOGREL BISULFATE 75 MG/1
75 TABLET, FILM COATED ORAL DAILY
Refills: 0 | Status: ACTIVE | COMMUNITY
Start: 2018-01-19

## 2022-10-14 RX ORDER — LEVOTHYROXINE SODIUM 0.11 MG/1
112 TABLET ORAL
Refills: 0 | Status: ACTIVE | COMMUNITY
Start: 2018-05-29

## 2022-10-14 RX ORDER — DENOSUMAB 60 MG/ML
60 INJECTION SUBCUTANEOUS
Refills: 0 | Status: ACTIVE | COMMUNITY

## 2022-11-01 ENCOUNTER — APPOINTMENT (OUTPATIENT)
Dept: CARDIOLOGY | Facility: CLINIC | Age: 87
End: 2022-11-01

## 2022-11-22 ENCOUNTER — APPOINTMENT (OUTPATIENT)
Dept: CARDIOLOGY | Facility: CLINIC | Age: 87
End: 2022-11-22

## 2022-11-22 NOTE — HISTORY OF PRESENT ILLNESS
[FreeTextEntry1] : Ms. CAREN JOHNSON 89 year old F is here Nov 22, 2022 to establish care in the [facility] program.\par

## 2023-01-04 NOTE — ED ADULT TRIAGE NOTE - WEIGHT IN KG
Christin Dinh presents to the clinic today for management of her anticoagulation therapy in treatment of her deep vein thrombosis. Target INR is 2.0-3.0. Her INR today was therapeutic at 2.2 on 40 mg of Warfarin weekly. Her previous INR was supra-therapeutic at 3.1 on 12/7/22. The patient denies medication changes since the last visit. She  denies diet changes since the last visit. She was able to ambulate to office without assistive device. Christin will continue taking 40 mg of Warfarin weekly (7.5 mg on Mondays and Fridays and 5 mg the other 5 days of the week) and return to the clinic in another 4 weeks on 2/1/23 for INR fingerstick. Onsite billing provider is RAFAEL Cantu.     
55.8

## 2023-01-17 ENCOUNTER — NON-APPOINTMENT (OUTPATIENT)
Age: 88
End: 2023-01-17

## 2023-01-17 ENCOUNTER — APPOINTMENT (OUTPATIENT)
Dept: CARDIOLOGY | Facility: CLINIC | Age: 88
End: 2023-01-17
Payer: MEDICARE

## 2023-01-17 VITALS
DIASTOLIC BLOOD PRESSURE: 82 MMHG | SYSTOLIC BLOOD PRESSURE: 153 MMHG | HEART RATE: 60 BPM | OXYGEN SATURATION: 99 % | BODY MASS INDEX: 21.61 KG/M2 | HEIGHT: 63 IN

## 2023-01-17 VITALS — BODY MASS INDEX: 21.61 KG/M2 | WEIGHT: 122 LBS

## 2023-01-17 DIAGNOSIS — I25.10 ATHEROSCLEROTIC HEART DISEASE OF NATIVE CORONARY ARTERY W/OUT ANGINA PECTORIS: ICD-10-CM

## 2023-01-17 DIAGNOSIS — Z95.0 PRESENCE OF CARDIAC PACEMAKER: ICD-10-CM

## 2023-01-17 DIAGNOSIS — I48.0 PAROXYSMAL ATRIAL FIBRILLATION: ICD-10-CM

## 2023-01-17 DIAGNOSIS — I49.5 SICK SINUS SYNDROME: ICD-10-CM

## 2023-01-17 PROCEDURE — 99204 OFFICE O/P NEW MOD 45 MIN: CPT | Mod: 25

## 2023-01-17 PROCEDURE — 93000 ELECTROCARDIOGRAM COMPLETE: CPT

## 2023-01-17 NOTE — HISTORY OF PRESENT ILLNESS
[FreeTextEntry1] : 89 year old woman with history of MICRA, AFIB on Eliquis, HTN here to establish care.\par #AFib- On Eliquis and Metoprolol\par Condition is stable, continue present meds\par #HTN- Continue HCTZ and Metoprolol, continue present meds\par #TBS- sp Micra

## 2023-01-17 NOTE — DISCUSSION/SUMMARY
[FreeTextEntry1] : 89 year old woman with AFib, HTN HLD Micra\par \par #AFib- On Eliquis and Metoprolol\par Condition is stable, continue present meds\par #HTN- Continue HCTZ and Metoprolol, continue present meds\par #TBS- sp Micra\par # 6 month followup [EKG obtained to assist in diagnosis and management of assessed problem(s)] : EKG obtained to assist in diagnosis and management of assessed problem(s)

## 2023-01-20 NOTE — CONSULT NOTE ADULT - ASSESSMENT
89 y/o F PMHx recent PPM placement (03/24/22), Afib, CAD, CVA, CLL, spinal stenosis, and hypothyroidism, sent in from EP clinic for erythema/purulence at PPM site. Admitted for infected PPM, EP planning for device removal.     Impression:  #Infected PPM Site - noted to have purulence, will cover for MRSA    Recs:  incomplete note 89 y/o F PMHx recent PPM placement (03/24/22), Afib, CAD, CVA, CLL, spinal stenosis, and hypothyroidism, sent in from EP clinic for erythema/purulence at PPM site. Admitted for infected PPM, EP planning for device removal.     Impression:  #Infected PPM Site - erythema noted with pinpoint dehiscence, no systemic signs of infection. Will cover for MRSA    Recs:  - start vancomycin 750mg IV q24H  - d/c cefepime  - monitor vanc trough, renal function  - f/u blood cultures, drainage culture  - awaiting device removal, send for culture    Plan discussed with ACP    Nj Singh MD  Infectious Disease Fellow  Available on [employer] Teams  Before 9AM or after 5PM: [phone]  Pt seen for crutch training

## 2023-02-02 NOTE — ED PROVIDER NOTE - SECONDARY DIAGNOSIS.
Constipation Unique Flap 1 Text: Because of the full-thickness nature of the defect, and to maintain form and function of the nose, and the proximity to the nasal tip and ala, a bilateral advancement flap was carefully planned. After anesthesia and prep, a Burow's triangle was excised above the defect up to the nasal root, and a Burow’s triangle displaced centrally and excised below the defect down to the columella.  Two laterally-based flaps were created by undermining at the level of the periosteum and perichondrium skeletonizing the nasal tip, dorsum and sidewalls.  After hemostasis, a tension-reduction stitch was placed at the level of the distal nasal bone, and the flaps were sutured together in a layered fashion.

## 2023-10-11 ENCOUNTER — APPOINTMENT (OUTPATIENT)
Dept: ELECTROPHYSIOLOGY | Facility: CLINIC | Age: 88
End: 2023-10-11
Payer: MEDICARE

## 2023-10-11 ENCOUNTER — NON-APPOINTMENT (OUTPATIENT)
Age: 88
End: 2023-10-11

## 2023-10-11 PROCEDURE — 93294 REM INTERROG EVL PM/LDLS PM: CPT

## 2023-10-11 PROCEDURE — 93296 REM INTERROG EVL PM/IDS: CPT

## 2024-01-10 ENCOUNTER — APPOINTMENT (OUTPATIENT)
Dept: ELECTROPHYSIOLOGY | Facility: CLINIC | Age: 89
End: 2024-01-10

## 2024-02-13 ENCOUNTER — APPOINTMENT (OUTPATIENT)
Dept: ELECTROPHYSIOLOGY | Facility: CLINIC | Age: 89
End: 2024-02-13

## 2024-02-29 ENCOUNTER — NON-APPOINTMENT (OUTPATIENT)
Age: 89
End: 2024-02-29

## 2024-02-29 ENCOUNTER — APPOINTMENT (OUTPATIENT)
Dept: ELECTROPHYSIOLOGY | Facility: CLINIC | Age: 89
End: 2024-02-29
Payer: MEDICARE

## 2024-02-29 PROCEDURE — 93294 REM INTERROG EVL PM/LDLS PM: CPT

## 2024-02-29 PROCEDURE — 93296 REM INTERROG EVL PM/IDS: CPT

## 2024-03-18 ENCOUNTER — APPOINTMENT (OUTPATIENT)
Dept: ELECTROPHYSIOLOGY | Facility: CLINIC | Age: 89
End: 2024-03-18

## 2024-05-17 NOTE — PRE-OP CHECKLIST - AS BP NONINV SITE
"Care Companion Intervention    Reason for intervention: Hypotension  Comment:  Pt's B/P slightly lower than normal.  She said it has been "bouncing around a little"    Intervention: Other intervention (comment)  Comment:  Called pt to check on her.  Asked her if she was having any weakness, dizziness, or falls.  She states she is feeling fine.  States none of the above symptoms or falls.  I reminded her of orthostatic precautions, and she said she will get up slowly to avoid getting dizzy or falling.  I also suggested she might take her device to the [facility] or [facility] to have it checked, and she says she will do so.  Reminded her we are not here on weekends but that she should go to  or the ER if necessary.  I will continue to monitor.    " right upper arm

## 2024-06-03 ENCOUNTER — NON-APPOINTMENT (OUTPATIENT)
Age: 89
End: 2024-06-03

## 2024-06-03 ENCOUNTER — APPOINTMENT (OUTPATIENT)
Dept: ELECTROPHYSIOLOGY | Facility: CLINIC | Age: 89
End: 2024-06-03
Payer: MEDICARE

## 2024-06-03 PROCEDURE — 93294 REM INTERROG EVL PM/LDLS PM: CPT

## 2024-06-03 PROCEDURE — 93296 REM INTERROG EVL PM/IDS: CPT

## 2024-09-03 ENCOUNTER — NON-APPOINTMENT (OUTPATIENT)
Age: 89
End: 2024-09-03

## 2024-09-03 ENCOUNTER — APPOINTMENT (OUTPATIENT)
Dept: ELECTROPHYSIOLOGY | Facility: CLINIC | Age: 89
End: 2024-09-03
Payer: MEDICARE

## 2024-09-03 PROCEDURE — 93294 REM INTERROG EVL PM/LDLS PM: CPT

## 2024-09-03 PROCEDURE — 93296 REM INTERROG EVL PM/IDS: CPT

## 2024-10-08 NOTE — ED PROVIDER NOTE - IV ALTEPLASE EXCL REL HIDDEN
show
Is This A New Presentation, Or A Follow-Up?: Skin Lesion
Is This A New Presentation, Or A Follow-Up?: Skin Lesions

## 2024-12-03 ENCOUNTER — APPOINTMENT (OUTPATIENT)
Dept: ELECTROPHYSIOLOGY | Facility: CLINIC | Age: 88
End: 2024-12-03

## 2024-12-16 ENCOUNTER — NON-APPOINTMENT (OUTPATIENT)
Age: 88
End: 2024-12-16

## 2024-12-16 ENCOUNTER — APPOINTMENT (OUTPATIENT)
Dept: ELECTROPHYSIOLOGY | Facility: CLINIC | Age: 88
End: 2024-12-16
Payer: MEDICARE

## 2024-12-16 PROCEDURE — 93296 REM INTERROG EVL PM/IDS: CPT

## 2024-12-16 PROCEDURE — 93294 REM INTERROG EVL PM/LDLS PM: CPT

## 2025-02-04 NOTE — PATIENT PROFILE ADULT. - PROVIDER NOTIFICATION
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 She has undergone a bevy of interventional procedures such as cervical epidurals versus lumbar epidurals in the past, which gave her excellent sustained benefit.  PRESENTING TODAY: In revisit encounter in regard to her continued neck and lower back pain. She is most recently s/p a series of three left L2-L3, L3-L4 SNRIs w/MAC with her most recent injection on 6/29/23 with excellent relief. She states that her pain has been tolerable. She continues to respond well to periodic epidural steroid injections. She has been undergoing physical therapy treatments for her lumbar spine and aggravated her shoulder pain on one of the exercises. I recommended she visit orthopedics for evaluation. She has also been utilizing Gabapentin 300 mg QID and Cymbalta which provides her with at least 50% relief on most days. She states that her pain continues to be worse at night before bedtime and does not allow for adequate sleep.   TODAY, 5-: Revisit encounter. She is a former Dr. Ann patient presenting to [facility]. She has chronic neck and lower back pain.   She states her pain is currently manageable. She has recently been going through a lot with her daughter and do to all of the stress from dealing with her daughter's health conditions, she was recently placed on anti-anxiety medications. For her pain, she medically manages her pain with Gabapentin 300 mg 1 tablet qhs and Cymbalta 30 mg 2 tablets qhs.   2-4-2025: Revisit encounter.   She is presenting today with intermittent pain in the neck.   With regards to her lower back pain, she is presenting with pain flaring up. She has been experiencing worsening pain in the lower lumbar spine with referred pain into the left worse than right lower extremity. She describes the pain as sharp, shooting, throbbing, aching. Her pain is made worse with standing or walking for prolonged periods of time. Her pain is present daily and rated at a 7/10 on the pain scale.   For her pain, she medically manages her pain with Gabapentin 300 mg 1 tablet qhs and Cymbalta 30 mg 2 tablets qhs. 
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 She has undergone a bevy of interventional procedures such as cervical epidurals versus lumbar epidurals in the past, which gave her excellent sustained benefit.  PRESENTING TODAY: In revisit encounter in regard to her continued neck and lower back pain. She is most recently s/p a series of three left L2-L3, L3-L4 SNRIs w/MAC with her most recent injection on 6/29/23 with excellent relief. She states that her pain has been tolerable. She continues to respond well to periodic epidural steroid injections. She has been undergoing physical therapy treatments for her lumbar spine and aggravated her shoulder pain on one of the exercises. I recommended she visit orthopedics for evaluation. She has also been utilizing Gabapentin 300 mg QID and Cymbalta which provides her with at least 50% relief on most days. She states that her pain continues to be worse at night before bedtime and does not allow for adequate sleep.   TODAY, 5-: Revisit encounter. She is a former Dr. Ann patient presenting to [facility]. She has chronic neck and lower back pain.   She states her pain is currently manageable. She has recently been going through a lot with her daughter and do to all of the stress from dealing with her daughter's health conditions, she was recently placed on anti-anxiety medications. For her pain, she medically manages her pain with Gabapentin 300 mg 1 tablet qhs and Cymbalta 30 mg 2 tablets qhs.   2-4-2025: Revisit encounter.   She is presenting today with intermittent pain in the neck.   With regards to her lower back pain, she is presenting with pain flaring up. She has been experiencing worsening pain in the lower lumbar spine with referred pain into the left worse than right lower extremity. She describes the pain as sharp, shooting, throbbing, aching. Her pain is made worse with standing or walking for prolonged periods of time. Her pain is present daily and rated at a 7/10 on the pain scale.   For her pain, she medically manages her pain with Gabapentin 300 mg 1 tablet qhs and Cymbalta 30 mg 2 tablets qhs. 
Declines

## 2025-03-18 ENCOUNTER — APPOINTMENT (OUTPATIENT)
Dept: ELECTROPHYSIOLOGY | Facility: CLINIC | Age: 89
End: 2025-03-18
Payer: MEDICARE

## 2025-03-18 ENCOUNTER — NON-APPOINTMENT (OUTPATIENT)
Age: 89
End: 2025-03-18

## 2025-03-18 PROCEDURE — 93296 REM INTERROG EVL PM/IDS: CPT

## 2025-03-18 PROCEDURE — 93294 REM INTERROG EVL PM/LDLS PM: CPT

## 2025-06-12 NOTE — H&P ADULT - NSHPLABSRESULTS_GEN_ALL_CORE
.  LABS:                         15.8   8.9   )-----------( 249      ( 15 Vishal 2018 14:52 )             45.7     06-15    136  |  97  |  14  ----------------------------<  118<H>  4.1   |  25  |  0.77    Ca    9.5      15 Vishal 2018 14:52    TPro  7.8  /  Alb  4.7  /  TBili  1.1  /  DBili  x   /  AST  18  /  ALT  12  /  AlkPhos  86  06-15    PT/INR - ( 15 Vishal 2018 14:52 )   PT: 11.0 sec;   INR: 1.02 ratio         PTT - ( 15 Vishal 2018 14:52 )  PTT:33.1 sec              RADIOLOGY, EKG & ADDITIONAL TESTS: Reviewed.
2

## 2025-06-17 ENCOUNTER — APPOINTMENT (OUTPATIENT)
Dept: ELECTROPHYSIOLOGY | Facility: CLINIC | Age: 89
End: 2025-06-17

## 2025-06-30 ENCOUNTER — APPOINTMENT (OUTPATIENT)
Dept: ELECTROPHYSIOLOGY | Facility: CLINIC | Age: 89
End: 2025-06-30
Payer: MEDICARE

## 2025-06-30 ENCOUNTER — NON-APPOINTMENT (OUTPATIENT)
Age: 89
End: 2025-06-30

## 2025-06-30 PROCEDURE — 93296 REM INTERROG EVL PM/IDS: CPT

## 2025-06-30 PROCEDURE — 93294 REM INTERROG EVL PM/LDLS PM: CPT
